# Patient Record
Sex: MALE | Race: WHITE | NOT HISPANIC OR LATINO | Employment: OTHER | URBAN - METROPOLITAN AREA
[De-identification: names, ages, dates, MRNs, and addresses within clinical notes are randomized per-mention and may not be internally consistent; named-entity substitution may affect disease eponyms.]

---

## 2018-04-25 ENCOUNTER — TRANSFERRED RECORDS (OUTPATIENT)
Dept: HEALTH INFORMATION MANAGEMENT | Facility: CLINIC | Age: 68
End: 2018-04-25

## 2018-04-25 LAB
ALT SERPL-CCNC: 23 IU/L (ref 6–40)
AST SERPL-CCNC: 16 IU/L (ref 10–40)
CREAT SERPL-MCNC: 1.05 MG/DL (ref 0.7–1.2)
GLUCOSE SERPL-MCNC: 261 MG/DL (ref 70–100)
HBA1C MFR BLD: 10.5 % (ref 4–6)
POTASSIUM SERPL-SCNC: 4 MEQ/L (ref 3.4–5.1)

## 2018-04-27 ENCOUNTER — OFFICE VISIT (OUTPATIENT)
Dept: FAMILY MEDICINE | Facility: OTHER | Age: 68
End: 2018-04-27
Attending: NURSE PRACTITIONER
Payer: MEDICAID

## 2018-04-27 VITALS
OXYGEN SATURATION: 97 % | TEMPERATURE: 98.3 F | RESPIRATION RATE: 18 BRPM | DIASTOLIC BLOOD PRESSURE: 118 MMHG | SYSTOLIC BLOOD PRESSURE: 182 MMHG | WEIGHT: 286.6 LBS | HEART RATE: 89 BPM | HEIGHT: 74 IN | BODY MASS INDEX: 36.78 KG/M2

## 2018-04-27 DIAGNOSIS — E11.9 TYPE 2 DIABETES MELLITUS WITHOUT COMPLICATION, WITHOUT LONG-TERM CURRENT USE OF INSULIN (H): ICD-10-CM

## 2018-04-27 DIAGNOSIS — I10 BENIGN ESSENTIAL HYPERTENSION: Primary | ICD-10-CM

## 2018-04-27 PROBLEM — R41.82 ALTERED MENTAL STATUS: Status: ACTIVE | Noted: 2018-04-27

## 2018-04-27 PROBLEM — M54.40 CHRONIC BILATERAL LOW BACK PAIN WITH SCIATICA: Status: ACTIVE | Noted: 2018-04-27

## 2018-04-27 PROBLEM — M54.42 CHRONIC BILATERAL LOW BACK PAIN WITH SCIATICA: Status: ACTIVE | Noted: 2018-04-27

## 2018-04-27 PROBLEM — M54.41 CHRONIC BILATERAL LOW BACK PAIN WITH SCIATICA: Status: ACTIVE | Noted: 2018-04-27

## 2018-04-27 PROBLEM — R53.82 CHRONIC FATIGUE: Status: ACTIVE | Noted: 2018-04-27

## 2018-04-27 PROBLEM — H53.9 VISION CHANGES: Status: ACTIVE | Noted: 2018-04-27

## 2018-04-27 PROBLEM — R47.81 SLURRED SPEECH: Status: ACTIVE | Noted: 2018-04-27

## 2018-04-27 PROBLEM — E66.9 OBESITY: Status: ACTIVE | Noted: 2018-04-27

## 2018-04-27 PROBLEM — R26.9 ALTERED GAIT: Status: ACTIVE | Noted: 2018-04-27

## 2018-04-27 PROBLEM — G89.29 CHRONIC BILATERAL LOW BACK PAIN WITH SCIATICA: Status: ACTIVE | Noted: 2018-04-27

## 2018-04-27 PROCEDURE — 99214 OFFICE O/P EST MOD 30 MIN: CPT | Performed by: NURSE PRACTITIONER

## 2018-04-27 PROCEDURE — G0463 HOSPITAL OUTPT CLINIC VISIT: HCPCS | Performed by: NURSE PRACTITIONER

## 2018-04-27 RX ORDER — CHOLECALCIFEROL (VITAMIN D3) 25 MCG
1000 CAPSULE ORAL 2 TIMES DAILY
COMMUNITY

## 2018-04-27 RX ORDER — LANOLIN ALCOHOL/MO/W.PET/CERES
1000 CREAM (GRAM) TOPICAL DAILY
COMMUNITY
End: 2018-06-14

## 2018-04-27 RX ORDER — ATENOLOL 50 MG/1
50 TABLET ORAL DAILY
Qty: 90 TABLET | Refills: 1 | Status: SHIPPED | OUTPATIENT
Start: 2018-04-27 | End: 2018-04-30

## 2018-04-27 RX ORDER — LISINOPRIL AND HYDROCHLOROTHIAZIDE 20; 25 MG/1; MG/1
1 TABLET ORAL DAILY
Qty: 30 TABLET | Refills: 0 | COMMUNITY
Start: 2018-04-27 | End: 2018-05-02

## 2018-04-27 ASSESSMENT — ANXIETY QUESTIONNAIRES
4. TROUBLE RELAXING: NEARLY EVERY DAY
3. WORRYING TOO MUCH ABOUT DIFFERENT THINGS: NOT AT ALL
6. BECOMING EASILY ANNOYED OR IRRITABLE: NOT AT ALL
5. BEING SO RESTLESS THAT IT IS HARD TO SIT STILL: NOT AT ALL
GAD7 TOTAL SCORE: 4
IF YOU CHECKED OFF ANY PROBLEMS ON THIS QUESTIONNAIRE, HOW DIFFICULT HAVE THESE PROBLEMS MADE IT FOR YOU TO DO YOUR WORK, TAKE CARE OF THINGS AT HOME, OR GET ALONG WITH OTHER PEOPLE: NOT DIFFICULT AT ALL
7. FEELING AFRAID AS IF SOMETHING AWFUL MIGHT HAPPEN: NOT AT ALL
2. NOT BEING ABLE TO STOP OR CONTROL WORRYING: NOT AT ALL
1. FEELING NERVOUS, ANXIOUS, OR ON EDGE: SEVERAL DAYS

## 2018-04-27 ASSESSMENT — PAIN SCALES - GENERAL: PAINLEVEL: NO PAIN (0)

## 2018-04-27 NOTE — PATIENT INSTRUCTIONS
ASSESSMENT/PLAN:       1. Benign essential hypertension  Continue lisinopril/HCTZ  - atenolol (TENORMIN) 50 MG tablet; Take 1 tablet (50 mg) by mouth daily  Dispense: 90 tablet; Refill: 1    2. Type 2 diabetes mellitus without complication, without long-term current use of insulin (H)  New onset  - metFORMIN (GLUCOPHAGE) 500 MG tablet; Take 2 tablets (1,000 mg) by mouth 2 times daily (with meals)  Dispense: 180 tablet; Refill: 3  Metformin start:   Week 1:  One tablet (500mg) with breakfast   Week 2:  One tablet (500mg) with breakfast and supper   Week 3:  Two tablets (1000mg) with breakfast and one tablet (500mg) with supper   Week 4:  Two tablets (1000mg) with breakfast and supper - please stay on this dose.      FUTURE APPOINTMENTS:       - Follow-up visit Monday with Dr Brayan Anand, NP  [facility]

## 2018-04-27 NOTE — NURSING NOTE
"Chief Complaint   Patient presents with     Hypertension       Initial BP (!) 182/118 (BP Location: Right arm, Patient Position: Chair, Cuff Size: Adult Large)  Pulse 89  Temp 98.3  F (36.8  C) (Tympanic)  Resp 18  Ht 6' 2\" (1.88 m)  Wt 286 lb 9.6 oz (130 kg)  SpO2 97%  BMI 36.8 kg/m2 Estimated body mass index is 36.8 kg/(m^2) as calculated from the following:    Height as of this encounter: 6' 2\" (1.88 m).    Weight as of this encounter: 286 lb 9.6 oz (130 kg).  Medication Reconciliation: complete   Pamela M Lechevalier LPN      "

## 2018-04-27 NOTE — MR AVS SNAPSHOT
After Visit Summary   4/27/2018    Deon Culver    MRN: [id number]           Patient Information     Date Of Birth          1950        Visit Information        Provider Department      4/27/2018 4:15 PM Yvette Anand NP [facility]        Today's Diagnoses     Benign essential hypertension    -  1    Type 2 diabetes mellitus without complication, without long-term current use of insulin (H)          Care Instructions      ASSESSMENT/PLAN:       1. Benign essential hypertension  Continue lisinopril/HCTZ  - atenolol (TENORMIN) 50 MG tablet; Take 1 tablet (50 mg) by mouth daily  Dispense: 90 tablet; Refill: 1    2. Type 2 diabetes mellitus without complication, without long-term current use of insulin (H)  New onset  - metFORMIN (GLUCOPHAGE) 500 MG tablet; Take 2 tablets (1,000 mg) by mouth 2 times daily (with meals)  Dispense: 180 tablet; Refill: 3  Metformin start:   Week 1:  One tablet (500mg) with breakfast   Week 2:  One tablet (500mg) with breakfast and supper   Week 3:  Two tablets (1000mg) with breakfast and one tablet (500mg) with supper   Week 4:  Two tablets (1000mg) with breakfast and supper - please stay on this dose.      FUTURE APPOINTMENTS:       - Follow-up visit Monday with Dr Brayan Anand NP  [facility]          Follow-ups after your visit        Your next 10 appointments already scheduled     Apr 30, 2018 10:00 AM CDT   (Arrive by 9:45 AM)   New Patient with Sreedhar Mendieta DO   [facility] ([facility] )    [address]  [place] [id number] [phone]           Instruct patient to bring all current medications for their initial appointment.              Who to contact     If you have questions or need follow up information about today's clinic visit or your schedule please contact [facility] directly at [phone].  Normal or  "non-critical lab and imaging results will be communicated to you by MyChart, letter or phone within 4 business days after the clinic has received the results. If you do not hear from us within 7 days, please contact the clinic through elmenust or phone. If you have a critical or abnormal lab result, we will notify you by phone as soon as possible.  Submit refill requests through Mobii or call your pharmacy and they will forward the refill request to us. Please allow 3 business days for your refill to be completed.          Additional Information About Your Visit        Mobii Information     Mobii lets you send messages to your doctor, view your test results, renew your prescriptions, schedule appointments and more. To sign up, go to [email or web address] . Click on \"Log in\" on the left side of the screen, which will take you to the Welcome page. Then click on \"Sign up Now\" on the right side of the page.     You will be asked to enter the access code listed below, as well as some personal information. Please follow the directions to create your username and password.     Your access code is: [email or web address]  Expires: 2018  4:43 PM     Your access code will  in 90 days. If you need help or a new code, please call your [place] clinic or [phone].        Care EveryWhere ID     This is your Care EveryWhere ID. This could be used by other organizations to access your [place] medical records  [id number]        Your Vitals Were     Pulse Temperature Respirations Height Pulse Oximetry BMI (Body Mass Index)    89 98.3  F (36.8  C) (Tympanic) 18 6' 2\" (1.88 m) 97% 36.8 kg/m2       Blood Pressure from Last 3 Encounters:   18 (!) 182/118    Weight from Last 3 Encounters:   18 286 lb 9.6 oz (130 kg)              Today, you had the following     No orders found for display         Today's Medication Changes          These changes are accurate as of 18  4:43 PM.  If you have any questions, " ask your nurse or doctor.               Start taking these medicines.        Dose/Directions    atenolol 50 MG tablet   Commonly known as:  TENORMIN   Used for:  Benign essential hypertension   Started by:  Yvette Anand NP        Dose:  50 mg   Take 1 tablet (50 mg) by mouth daily   Quantity:  90 tablet   Refills:  1       metFORMIN 500 MG tablet   Commonly known as:  GLUCOPHAGE   Used for:  Type 2 diabetes mellitus without complication, without long-term current use of insulin (H)   Started by:  Yvette Anand NP        Dose:  1000 mg   Take 2 tablets (1,000 mg) by mouth 2 times daily (with meals)   Quantity:  180 tablet   Refills:  3            Where to get your medicines      These medications were sent to [facility] [address] [place] [address] [address]  [address] [facility] [address]     Phone:  [phone]     atenolol 50 MG tablet    metFORMIN 500 MG tablet                Primary Care Provider Fax #    Physician No Ref-Primary [phone]       No address on file        Equal Access to Services     [facility] : Hadii bonnie ku hadasho Soomaali, waaxda luqadaha, qaybta kaalmada adeegyada, waxay claudia mckeon . So [facility] [place] [facility] [phone].    ATENCIÓN: Si habla español, tiene a rodriguez disposición servicios gratuitos de asistencia lingüística. Llame al [phone].    We comply with applicable federal civil rights laws and [place] laws. We do not discriminate on the basis of race, color, national origin, age, disability, sex, sexual orientation, or gender identity.            Thank you!     Thank you for choosing [facility]  for your care. Our goal is always to provide you with excellent care. Hearing back from our patients is one way we can continue to improve our services. Please take a few minutes to complete the written survey that you may receive in the mail after your visit with us. Thank you!             Your Updated Medication  List - Protect others around you: Learn how to safely use, store and throw away your medicines at www.disposemymeds.org.          This list is accurate as of 4/27/18  4:43 PM.  Always use your most recent med list.                   Brand Name Dispense Instructions for use Diagnosis    atenolol 50 MG tablet    TENORMIN    90 tablet    Take 1 tablet (50 mg) by mouth daily    Benign essential hypertension       cyanocobalamin 1000 MCG tablet    vitamin  B-12     Take 1,000 mcg by mouth daily        lisinopril-hydrochlorothiazide 20-25 MG per tablet    PRINZIDE/ZESTORETIC    30 tablet    Take 1 tablet by mouth daily    Benign essential hypertension       metFORMIN 500 MG tablet    GLUCOPHAGE    180 tablet    Take 2 tablets (1,000 mg) by mouth 2 times daily (with meals)    Type 2 diabetes mellitus without complication, without long-term current use of insulin (H)       Vitamin D-3 1000 units Caps      Take 1,000 Units by mouth

## 2018-04-27 NOTE — PROGRESS NOTES
SUBJECTIVE:   Deon Culver is a 68 year old male who presents to clinic today for the following health issues:  Hypertension     Mendoza was in the ED on April 25, 2018.  He actually presented to [facility], blood pressure was elevated and they sent him to the ED. Consent obtained, [facility] medical record reviewed.      Glucose was 261mg/dl A1c is 10.5% - now onset diabetes.  Urine had 500 glucose and 30 protein.  HGB was 17.6.      He was started on lisinopril/HCTZ; home BP readings continue to be elevated.  CT, EKG were completed.      Hypertension       Outpatient blood pressures are being checked at home.  Results are 170'-105.    Low Salt Diet: no added salt      Amount of exercise or physical activity: 6-7 days/week for an average of less than 15 minutes    Problems taking medications regularly: No    Medication side effects: none    Diet: regular (no restrictions)      Problem list and histories reviewed & adjusted, as indicated.  Additional history: as documented    Patient Active Problem List   Diagnosis     Benign essential hypertension     Obesity     Type 2 diabetes mellitus with complication, without long-term current use of insulin (H)     Chronic fatigue     Slurred speech x 3 months, CT neg ED [facility] 4/25/18     Vision changes x 3 months as of 4/18     Chronic bilateral low back pain with sciatica     Altered gait     Altered mental status - since approx 1/18 - delayed response, change in mentation     No past surgical history on file.    Social History   Substance Use Topics     Smoking status: Never Smoker     Smokeless tobacco: Never Used     Alcohol use No     History reviewed. No pertinent family history.      Current Outpatient Prescriptions   Medication Sig Dispense Refill     Cholecalciferol (VITAMIN D-3) 1000 units CAPS Take 1,000 Units by mouth       cyanocobalamin (VITAMIN  B-12) 1000 MCG tablet Take 1,000 mcg by mouth daily       lisinopril-hydrochlorothiazide (PRINZIDE/ZESTORETIC)  "20-25 MG per tablet Take 1 tablet by mouth daily 30 tablet 0     No Known Allergies  No lab results found.   BP Readings from Last 3 Encounters:   04/27/18 (!) 182/118    Wt Readings from Last 3 Encounters:   04/27/18 286 lb 9.6 oz (130 kg)                    Reviewed and updated as needed this visit by clinical staff  Tobacco  Allergies  Fam Hx  Soc Hx      Reviewed and updated as needed this visit by Provider  Tobacco  Fam Hx  Soc Hx        ROS:  Constitutional, HEENT, cardiovascular, pulmonary, gi and gu systems are negative, except as otherwise noted.    OBJECTIVE:     BP (!) 182/118 (BP Location: Right arm, Patient Position: Chair, Cuff Size: Adult Large)  Pulse 89  Temp 98.3  F (36.8  C) (Tympanic)  Resp 18  Ht 6' 2\" (1.88 m)  Wt 286 lb 9.6 oz (130 kg)  SpO2 97%  BMI 36.8 kg/m2  Body mass index is 36.8 kg/(m^2).  GENERAL: healthy, alert and no distress  RESP: lungs clear to auscultation - no rales, rhonchi or wheezes  CV: regular rate and rhythm, normal S1 S2, no S3 or S4, no murmur, click or rub, no peripheral edema and peripheral pulses strong  MS: generalized weakness  PSYCH: mentation appears normal, affect normal/bright    This document is currently in Final Status    Exam Accession# [id number]    CT HEAD WO CONTRAST    HISTORY: 2-3 days of slurred speech 2 weeks ago and since resolved;     COMPARISON: None    FINDINGS: There is mild generalized atrophy with enlargement of the ventricles and subarachnoid spaces. No acute intra or extraaxial hemorrhage is seen. The gray-white matter differentiation is maintained. No mass effect or midline shift. The cervicomedullary junction is within normal limits. The visualized portions of the paranasal sinuses and mastoid air cells are clear.     IMPRESSION: No acute intracranial process identified.            Dictated By: Katharine Nieves MD 4/25/2018 6:25 PM  Edited By: KIRSTEN 4/25/2018 6:31 PM    Electronically Signed: Katharine Nieves MD 4/26/2018 9:11 AM    Component " Name Value Ref Range   Ventricular Rate 90 BPM    Atrial Rate 90 BPM    P-R Interval 236 ms   QRS Duration 118 ms    ms   QTc 486 ms   P Axis 62 degrees    R Axis -71 degrees    T Axis 82 degrees    Result Narrative   Confirming Doc MARCELLUS XIE MD  Sinus rhythm with 1st degree A-V block  Left anterior fascicular block  Left ventricular hypertrophy with QRS widening  Prolonged QT  Abnormal ECG  No previous ECGs available       ASSESSMENT/PLAN:       1. Benign essential hypertension  Continue lisinopril/HCTZ  - atenolol (TENORMIN) 50 MG tablet; Take 1 tablet (50 mg) by mouth daily  Dispense: 90 tablet; Refill: 1    2. Type 2 diabetes mellitus without complication, without long-term current use of insulin (H)  New onset  - metFORMIN (GLUCOPHAGE) 500 MG tablet; Take 2 tablets (1,000 mg) by mouth 2 times daily (with meals)  Dispense: 180 tablet; Refill: 3  Metformin start:   Week 1:  One tablet (500mg) with breakfast   Week 2:  One tablet (500mg) with breakfast and supper   Week 3:  Two tablets (1000mg) with breakfast and one tablet (500mg) with supper   Week 4:  Two tablets (1000mg) with breakfast and supper - please stay on this dose.      Reviewed carbohydrate counting, metformin onset, action, side effects and dosing.      30 minute visit today with greater than 50% spent in counseling on the above topics.  Answered all questions to the best of my ability.      FUTURE APPOINTMENTS:       - Follow-up visit Monday with Dr Brayan Anand, NP  [facility]

## 2018-04-28 ASSESSMENT — PATIENT HEALTH QUESTIONNAIRE - PHQ9: SUM OF ALL RESPONSES TO PHQ QUESTIONS 1-9: 9

## 2018-04-28 ASSESSMENT — ANXIETY QUESTIONNAIRES: GAD7 TOTAL SCORE: 4

## 2018-04-30 ENCOUNTER — OFFICE VISIT (OUTPATIENT)
Dept: INTERNAL MEDICINE | Facility: OTHER | Age: 68
End: 2018-04-30
Attending: INTERNAL MEDICINE
Payer: MEDICAID

## 2018-04-30 VITALS
OXYGEN SATURATION: 99 % | SYSTOLIC BLOOD PRESSURE: 160 MMHG | WEIGHT: 280 LBS | HEIGHT: 74 IN | DIASTOLIC BLOOD PRESSURE: 110 MMHG | TEMPERATURE: 95.9 F | HEART RATE: 70 BPM | BODY MASS INDEX: 35.94 KG/M2

## 2018-04-30 DIAGNOSIS — E11.8 TYPE 2 DIABETES MELLITUS WITH COMPLICATION, WITHOUT LONG-TERM CURRENT USE OF INSULIN (H): ICD-10-CM

## 2018-04-30 DIAGNOSIS — Z13.220 SCREENING FOR HYPERLIPIDEMIA: ICD-10-CM

## 2018-04-30 DIAGNOSIS — Z13.29 SCREENING FOR HYPOTHYROIDISM: ICD-10-CM

## 2018-04-30 DIAGNOSIS — Z12.5 SCREENING FOR PROSTATE CANCER: Primary | ICD-10-CM

## 2018-04-30 DIAGNOSIS — R53.83 OTHER FATIGUE: ICD-10-CM

## 2018-04-30 DIAGNOSIS — I10 BENIGN ESSENTIAL HYPERTENSION: ICD-10-CM

## 2018-04-30 DIAGNOSIS — R47.81 SLURRED SPEECH: ICD-10-CM

## 2018-04-30 LAB
CHOLEST SERPL-MCNC: 175 MG/DL
HDLC SERPL-MCNC: 50 MG/DL
LDLC SERPL CALC-MCNC: 86 MG/DL
NONHDLC SERPL-MCNC: 125 MG/DL
PSA SERPL-ACNC: 0.74 UG/L (ref 0–4)
TRIGL SERPL-MCNC: 196 MG/DL
TSH SERPL DL<=0.005 MIU/L-ACNC: 1.43 MU/L (ref 0.4–4)

## 2018-04-30 PROCEDURE — G0103 PSA SCREENING: HCPCS | Mod: ZL | Performed by: INTERNAL MEDICINE

## 2018-04-30 PROCEDURE — 84443 ASSAY THYROID STIM HORMONE: CPT | Mod: ZL | Performed by: INTERNAL MEDICINE

## 2018-04-30 PROCEDURE — 99205 OFFICE O/P NEW HI 60 MIN: CPT | Performed by: INTERNAL MEDICINE

## 2018-04-30 PROCEDURE — 80061 LIPID PANEL: CPT | Mod: ZL | Performed by: INTERNAL MEDICINE

## 2018-04-30 PROCEDURE — G0463 HOSPITAL OUTPT CLINIC VISIT: HCPCS | Performed by: INTERNAL MEDICINE

## 2018-04-30 PROCEDURE — 36415 COLL VENOUS BLD VENIPUNCTURE: CPT | Mod: ZL | Performed by: INTERNAL MEDICINE

## 2018-04-30 RX ORDER — METOPROLOL TARTRATE 50 MG
50 TABLET ORAL 2 TIMES DAILY
Qty: 60 TABLET | Refills: 1 | Status: SHIPPED | OUTPATIENT
Start: 2018-04-30 | End: 2018-06-24

## 2018-04-30 ASSESSMENT — PATIENT HEALTH QUESTIONNAIRE - PHQ9: 5. POOR APPETITE OR OVEREATING: NOT AT ALL

## 2018-04-30 ASSESSMENT — ANXIETY QUESTIONNAIRES
5. BEING SO RESTLESS THAT IT IS HARD TO SIT STILL: NOT AT ALL
7. FEELING AFRAID AS IF SOMETHING AWFUL MIGHT HAPPEN: NOT AT ALL
3. WORRYING TOO MUCH ABOUT DIFFERENT THINGS: NOT AT ALL
GAD7 TOTAL SCORE: 0
2. NOT BEING ABLE TO STOP OR CONTROL WORRYING: NOT AT ALL
1. FEELING NERVOUS, ANXIOUS, OR ON EDGE: NOT AT ALL
6. BECOMING EASILY ANNOYED OR IRRITABLE: NOT AT ALL

## 2018-04-30 ASSESSMENT — PAIN SCALES - GENERAL: PAINLEVEL: NO PAIN (0)

## 2018-04-30 NOTE — PROGRESS NOTES
SUBJECTIVE:    Chief Complaint   Patient presents with     Establish Care     referred by Temi Millan - pt is fasting today - no questions or concerns - has not doctored in 4 decades- [facility] is a 68 year old male with no PMH due to the fact he has not sought healthcare in 40+ years.  However recently he was seen at [facility] and found to be hypertensive and was sent ot he ED in [place].  In addition he was found to be diabetic with A1C of 10.5.  He complains of vision changes which is likely related to his Diabetes.  He is accompanied by his wife and daughter who also reports some change in sleeping habits, lethargy, weakness and even some slurred speech at times.  He also endorses chest pain at times.  No fevers or chills.  Some intentional weight loss over the past year.  He does smoke cigars routinely.  No falls are reported.    Records from [facility] ED visit were reviewed today along with his CT scan of the head from [facility].         Past Medical History:   Diagnosis Date     Altered gait 4/27/2018     Altered mental status - since approx 1/18 - delayed response, change in mentation 4/27/2018     Benign essential hypertension 4/27/2018     Chronic bilateral low back pain with sciatica 4/27/2018     Chronic fatigue 4/27/2018     Obesity 4/27/2018     Slurred speech x 3 months, CT neg ED [facility] 4/25/18 4/27/2018     Type 2 diabetes mellitus with complication, without long-term current use of insulin (H) 4/27/2018     Vision changes x 3 months as of 4/18 4/27/2018       History reviewed. No pertinent surgical history.     No Known Allergies    Medications:  Current Outpatient Prescriptions   Medication Sig Dispense Refill     Cholecalciferol (VITAMIN D-3) 1000 units CAPS Take 1,000 Units by mouth       cyanocobalamin (VITAMIN  B-12) 1000 MCG tablet Take 1,000 mcg by mouth daily       lisinopril-hydrochlorothiazide (PRINZIDE/ZESTORETIC) 20-25 MG per tablet Take 1 tablet by mouth daily  "30 tablet 0     metFORMIN (GLUCOPHAGE) 500 MG tablet Take 2 tablets (1,000 mg) by mouth 2 times daily (with meals) 180 tablet 3     metoprolol tartrate (LOPRESSOR) 50 MG tablet Take 1 tablet (50 mg) by mouth 2 times daily 60 tablet 1       Family History   Problem Relation Age of Onset     Colon Cancer Mother      Kidney Cancer Father      KIDNEY DISEASE Father      Coronary Artery Disease Brother        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      Social History     Social History Narrative        Review Of Systems  Constitutional: Fatigue and Weight Loss  Eyes: visual blurring  Ears/Nose/Throat: negative  Cardiovascular: chest pain  Respiratory: Shortness of breath  Gastrointestinal: negative  Genitourinary: negative  Musculoskeletal: arthritis, joint pain and muscular weakness  Skin: negative  Neurologic: as above  Endocrine: diabetes  Hematologic/Lymphatic/Immunologic: negative  Psychiatric: as above    OBJECTIVE:  BP (!) 160/110 (BP Location: Right arm, Patient Position: Supine, Cuff Size: Adult Large)  Pulse 70  Temp 95.9  F (35.5  C) (Tympanic)  Ht 6' 2\" (1.88 m)  Wt 280 lb (127 kg)  SpO2 99%  BMI 35.95 kg/m2  Body mass index is 35.95 kg/(m^2).   Gen: Well-developed, well-nourished and in no apparent distress  HEENT:  Normocephalic, atraumatic, PERRL, no scleral icterus, TMs visualized bilaterally without effusion/erythema, external auditory canals clear.  Cranial nerves grossly intact. Poor dentition and swollen uvula.    Neck: supple, no LAD, no thyromegaly. No bruits.    CV: regular rate and rhythm, normal S1 S2, no S3 or S4 and no murmur, click, or rub  Resp: Clear to ausculation bilaterally, normal respiratory effort  Abd: Obese-Bowel sounds present, soft NT/ND,  no masses or hepatosplenomegaly  Ext: +1 LE edema.   Neuro:  No focal deficits noted, however " cognition seems slighy slowed along with his speech and subtle slurring of words.    Skin: warm and dry  Psych: normal mood/affect, appropriately oriented.      ASSESSMENT/PLAN:  Pt is a 68 year old male here to establish care    Deon was seen today for establish care.    Diagnoses and all orders for this visit:    Screening for prostate cancer  -     PSA, screen    Screening for hypothyroidism  -     TSH with free T4 reflex    Screening for hyperlipidemia  -     Lipid Profile (Chol, Trig, HDL, LDL calc)    Other fatigue  -     NM Lexiscan stress test; Future    Slurred speech  -     MRA Angiogram Brain & MRI Brain w/o Cont; Future    Benign essential hypertension  -     metoprolol tartrate (LOPRESSOR) 50 MG tablet; Take 1 tablet (50 mg) by mouth 2 times daily    Type 2 diabetes mellitus with complication, without long-term current use of insulin (H)           Return to clinic in  2 weeks.    Labs today  MRI/MRA Brain  Stress test  Stop Atenolol and start Lopressor twice daily      Sreedhar Mendieta D.O.

## 2018-04-30 NOTE — MR AVS SNAPSHOT
"              After Visit Summary   4/30/2018    Deon Culver    MRN: [id number]           Patient Information     Date Of Birth          1950        Visit Information        Provider Department      4/30/2018 10:00 AM Sreedhar Mendieta DO [facility]        Today's Diagnoses     Screening for prostate cancer    -  1    Screening for hypothyroidism        Screening for hyperlipidemia        Other fatigue        Slurred speech        Benign essential hypertension          Care Instructions    Labs today  MRI/MRA Brain  Stress test  Stop Atenolol and start Lopressor twice daily          Follow-ups after your visit        Future tests that were ordered for you today     Open Future Orders        Priority Expected Expires Ordered    MRA Angiogram Brain & MRI Brain w/o Cont Routine  4/30/2019 4/30/2018    NM Lexiscan stress test Routine  4/30/2019 4/30/2018            Who to contact     If you have questions or need follow up information about today's clinic visit or your schedule please contact [facility] directly at [phone].  Normal or non-critical lab and imaging results will be communicated to you by MyChart, letter or phone within 4 business days after the clinic has received the results. If you do not hear from us within 7 days, please contact the clinic through Azoit or phone. If you have a critical or abnormal lab result, we will notify you by phone as soon as possible.  Submit refill requests through Farfetch or call your pharmacy and they will forward the refill request to us. Please allow 3 business days for your refill to be completed.          Additional Information About Your Visit        Biofuelboxhart Information     Farfetch lets you send messages to your doctor, view your test results, renew your prescriptions, schedule appointments and more. To sign up, go to [email or web address] . Click on \"Log in\" on the left side of the screen, which will take you to the " "Welcome page. Then click on \"Sign up Now\" on the right side of the page.     You will be asked to enter the access code listed below, as well as some personal information. Please follow the directions to create your username and password.     Your access code is: [email or web address]  Expires: 2018  4:43 PM     Your access code will  in 90 days. If you need help or a new code, please call your [place] clinic or [phone].        Care EveryWhere ID     This is your Care EveryWhere ID. This could be used by other organizations to access your [place] medical records  [id number]        Your Vitals Were     Pulse Temperature Height Pulse Oximetry BMI (Body Mass Index)       70 95.9  F (35.5  C) (Tympanic) 6' 2\" (1.88 m) 99% 35.95 kg/m2        Blood Pressure from Last 3 Encounters:   18 (!) 160/110   18 (!) 182/118    Weight from Last 3 Encounters:   18 280 lb (127 kg)   18 286 lb 9.6 oz (130 kg)              We Performed the Following     Lipid Profile (Chol, Trig, HDL, LDL calc)     PSA, screen     TSH with free T4 reflex          Today's Medication Changes          These changes are accurate as of 18 10:40 AM.  If you have any questions, ask your nurse or doctor.               Start taking these medicines.        Dose/Directions    metoprolol tartrate 50 MG tablet   Commonly known as:  LOPRESSOR   Used for:  Benign essential hypertension   Started by:  Sreedhar Mendieta DO        Dose:  50 mg   Take 1 tablet (50 mg) by mouth 2 times daily   Quantity:  60 tablet   Refills:  1         Stop taking these medicines if you haven't already. Please contact your care team if you have questions.     atenolol 50 MG tablet   Commonly known as:  TENORMIN   Stopped by:  Sreedhar Mendieta DO                Where to get your medicines      These medications were sent to [id number] - [place], [place] - [address]   [address] , [facility] [address]     Phone: "  [phone]     metoprolol tartrate 50 MG tablet                Primary Care Provider Fax #    Physician No Ref-Primary [phone]       No address on file        Equal Access to Services     ELVIA NAVA : Dejan aad ku hadblanca Lacey, cherelle bates, rowdy kaericka birch, fidel haindia rolando. So [facility] [phone].    ATENCIÓN: Si habla español, tiene a rodriguez disposición servicios gratuitos de asistencia lingüística. Llame al [phone].    We comply with applicable federal civil rights laws and [place] laws. We do not discriminate on the basis of race, color, national origin, age, disability, sex, sexual orientation, or gender identity.            Thank you!     Thank you for choosing [facility]  for your care. Our goal is always to provide you with excellent care. Hearing back from our patients is one way we can continue to improve our services. Please take a few minutes to complete the written survey that you may receive in the mail after your visit with us. Thank you!             Your Updated Medication List - Protect others around you: Learn how to safely use, store and throw away your medicines at www.disposemymeds.org.          This list is accurate as of 4/30/18 10:40 AM.  Always use your most recent med list.                   Brand Name Dispense Instructions for use Diagnosis    cyanocobalamin 1000 MCG tablet    vitamin  B-12     Take 1,000 mcg by mouth daily        lisinopril-hydrochlorothiazide 20-25 MG per tablet    PRINZIDE/ZESTORETIC    30 tablet    Take 1 tablet by mouth daily    Benign essential hypertension       metFORMIN 500 MG tablet    GLUCOPHAGE    180 tablet    Take 2 tablets (1,000 mg) by mouth 2 times daily (with meals)    Type 2 diabetes mellitus without complication, without long-term current use of insulin (H)       metoprolol tartrate 50 MG tablet    LOPRESSOR    60 tablet    Take 1 tablet (50 mg) by mouth 2 times daily    Benign  essential hypertension       Vitamin D-3 1000 units Caps      Take 1,000 Units by mouth

## 2018-05-01 ASSESSMENT — PATIENT HEALTH QUESTIONNAIRE - PHQ9: SUM OF ALL RESPONSES TO PHQ QUESTIONS 1-9: 9

## 2018-05-01 ASSESSMENT — ANXIETY QUESTIONNAIRES: GAD7 TOTAL SCORE: 0

## 2018-05-02 ENCOUNTER — TELEPHONE (OUTPATIENT)
Dept: INTERNAL MEDICINE | Facility: OTHER | Age: 68
End: 2018-05-02

## 2018-05-02 DIAGNOSIS — I10 BENIGN ESSENTIAL HYPERTENSION: Primary | ICD-10-CM

## 2018-05-02 RX ORDER — LISINOPRIL 40 MG/1
40 TABLET ORAL DAILY
Qty: 30 TABLET | Refills: 3 | Status: SHIPPED | OUTPATIENT
Start: 2018-05-02 | End: 2018-08-23

## 2018-05-02 RX ORDER — HYDROCHLOROTHIAZIDE 25 MG/1
25 TABLET ORAL DAILY
Qty: 30 TABLET | Refills: 1 | Status: SHIPPED | OUTPATIENT
Start: 2018-05-02 | End: 2018-06-24

## 2018-05-02 NOTE — TELEPHONE ENCOUNTER
Received a phone call from Samantha- patient financial supervision of [employer]- she states pt does not have insurance at this time- estimates coming back at $8,000 for MRI,MRA and stress test- would like to know if these are emergent or can wait until he gets insurance. Ninoska Red LPN

## 2018-05-04 ENCOUNTER — TRANSFERRED RECORDS (OUTPATIENT)
Dept: HEALTH INFORMATION MANAGEMENT | Facility: CLINIC | Age: 68
End: 2018-05-04

## 2018-05-04 ENCOUNTER — TELEPHONE (OUTPATIENT)
Dept: INTERNAL MEDICINE | Facility: OTHER | Age: 68
End: 2018-05-04

## 2018-05-04 NOTE — TELEPHONE ENCOUNTER
Mendoza would like to give Dr Mendieta an update on his blood pressure for today:    134/94 7:00am 5/4/2018  158/98 1:00pm 5/4/2018    Thank you

## 2018-05-04 NOTE — TELEPHONE ENCOUNTER
Dr. Mendieta notified of blood pressure readings. No further action at this time. Ninoska Red LPN

## 2018-05-14 ENCOUNTER — OFFICE VISIT (OUTPATIENT)
Dept: INTERNAL MEDICINE | Facility: OTHER | Age: 68
End: 2018-05-14
Attending: INTERNAL MEDICINE
Payer: MEDICAID

## 2018-05-14 VITALS
SYSTOLIC BLOOD PRESSURE: 138 MMHG | HEART RATE: 81 BPM | TEMPERATURE: 96.9 F | WEIGHT: 276 LBS | DIASTOLIC BLOOD PRESSURE: 88 MMHG | BODY MASS INDEX: 35.42 KG/M2 | OXYGEN SATURATION: 97 % | HEIGHT: 74 IN

## 2018-05-14 DIAGNOSIS — L24.81 IRRITANT CONTACT DERMATITIS DUE TO METALS: ICD-10-CM

## 2018-05-14 DIAGNOSIS — R21 RASH: Primary | ICD-10-CM

## 2018-05-14 DIAGNOSIS — I10 BENIGN ESSENTIAL HYPERTENSION: ICD-10-CM

## 2018-05-14 DIAGNOSIS — N40.0 BENIGN PROSTATIC HYPERPLASIA, UNSPECIFIED WHETHER LOWER URINARY TRACT SYMPTOMS PRESENT: ICD-10-CM

## 2018-05-14 PROCEDURE — G0463 HOSPITAL OUTPT CLINIC VISIT: HCPCS | Performed by: INTERNAL MEDICINE

## 2018-05-14 PROCEDURE — 36415 COLL VENOUS BLD VENIPUNCTURE: CPT | Mod: ZL | Performed by: INTERNAL MEDICINE

## 2018-05-14 PROCEDURE — 80048 BASIC METABOLIC PNL TOTAL CA: CPT | Mod: ZL | Performed by: INTERNAL MEDICINE

## 2018-05-14 PROCEDURE — 99214 OFFICE O/P EST MOD 30 MIN: CPT | Performed by: INTERNAL MEDICINE

## 2018-05-14 RX ORDER — HYDROCORTISONE VALERATE CREAM 2 MG/G
CREAM TOPICAL
Qty: 45 G | Refills: 0 | Status: SHIPPED | OUTPATIENT
Start: 2018-05-14 | End: 2018-06-14

## 2018-05-14 RX ORDER — TAMSULOSIN HYDROCHLORIDE 0.4 MG/1
0.4 CAPSULE ORAL DAILY
Qty: 90 CAPSULE | Refills: 3 | Status: SHIPPED | OUTPATIENT
Start: 2018-05-14 | End: 2019-03-27

## 2018-05-14 ASSESSMENT — ANXIETY QUESTIONNAIRES
5. BEING SO RESTLESS THAT IT IS HARD TO SIT STILL: NOT AT ALL
3. WORRYING TOO MUCH ABOUT DIFFERENT THINGS: NOT AT ALL
GAD7 TOTAL SCORE: 0
1. FEELING NERVOUS, ANXIOUS, OR ON EDGE: NOT AT ALL
7. FEELING AFRAID AS IF SOMETHING AWFUL MIGHT HAPPEN: NOT AT ALL
6. BECOMING EASILY ANNOYED OR IRRITABLE: NOT AT ALL
2. NOT BEING ABLE TO STOP OR CONTROL WORRYING: NOT AT ALL

## 2018-05-14 ASSESSMENT — PATIENT HEALTH QUESTIONNAIRE - PHQ9: 5. POOR APPETITE OR OVEREATING: NOT AT ALL

## 2018-05-14 ASSESSMENT — PAIN SCALES - GENERAL: PAINLEVEL: NO PAIN (0)

## 2018-05-14 NOTE — MR AVS SNAPSHOT
After Visit Summary   5/14/2018    Deon Culver    MRN: [id number]           Patient Information     Date Of Birth          1950        Visit Information        Provider Department      5/14/2018 3:30 PM Sreedhar Mendieta,  [facility]        Today's Diagnoses     Rash    -  1    Benign essential hypertension        Benign prostatic hyperplasia, unspecified whether lower urinary tract symptoms present          Care Instructions    Follow after testing complete  BMP lab today  Hydrocortisone cream prn rash  Flomax daily    Start Baby ASA daily 81 mg PO daily          Follow-ups after your visit        Who to contact     If you have questions or need follow up information about today's clinic visit or your schedule please contact [facility] directly at [phone].  Normal or non-critical lab and imaging results will be communicated to you by EcoScrapshart, letter or phone within 4 business days after the clinic has received the results. If you do not hear from us within 7 days, please contact the clinic through Infectioust or phone. If you have a critical or abnormal lab result, we will notify you by phone as soon as possible.  Submit refill requests through SignaCert or call your pharmacy and they will forward the refill request to us. Please allow 3 business days for your refill to be completed.          Additional Information About Your Visit        MyChart Information     SignaCert gives you secure access to your electronic health record. If you see a primary care provider, you can also send messages to your care team and make appointments. If you have questions, please call your primary care clinic.  If you do not have a primary care provider, please call [phone] and they will assist you.        Care EveryWhere ID     This is your Care EveryWhere ID. This could be used by other organizations to access your [place] medical records  [id number]        Your Vitals  "Were     Pulse Temperature Height Pulse Oximetry BMI (Body Mass Index)       81 96.9  F (36.1  C) (Tympanic) 6' 2\" (1.88 m) 97% 35.44 kg/m2        Blood Pressure from Last 3 Encounters:   05/14/18 138/88   04/30/18 (!) 160/110   04/27/18 (!) 182/118    Weight from Last 3 Encounters:   05/14/18 276 lb (125.2 kg)   04/30/18 280 lb (127 kg)   04/27/18 286 lb 9.6 oz (130 kg)              We Performed the Following     Basic metabolic panel          Today's Medication Changes          These changes are accurate as of 5/14/18  3:53 PM.  If you have any questions, ask your nurse or doctor.               Start taking these medicines.        Dose/Directions    hydrocortisone 0.2 % cream   Commonly known as:  WESTCORT   Used for:  Rash   Started by:  Sreedhar Mendieta DO        Apply sparingly to affected area three times daily as needed.   Quantity:  45 g   Refills:  0       tamsulosin 0.4 MG capsule   Commonly known as:  FLOMAX   Used for:  Benign prostatic hyperplasia, unspecified whether lower urinary tract symptoms present   Started by:  Sreedhar Mendieta DO        Dose:  0.4 mg   Take 1 capsule (0.4 mg) by mouth daily   Quantity:  90 capsule   Refills:  3            Where to get your medicines      [facility] [address] [address]  AT [facility] [address]  [address] [facility] [address]     Phone:  [phone]     tamsulosin 0.4 MG capsule         These medications were sent to [id number] - [facility] [address]   [address] [facility] [address]     Phone:  [phone]     hydrocortisone 0.2 % cream                Primary Care Provider Office Phone # Fax #    Sreedhar Mendieta [phone]       [facility] [address]        Equal Access to Services     ELVIA NAVA AH: Dejan ralph hadasho Soomaali, waaxda luqadaha, qaybta kaalmada adeegyada, fidel oconnellin " carlo marshalltrinityдмитрий zuletaNikoleyessica ah. So [facility] [phone].    ATENCIÓN: Si freddyla gaurang, tiene a rodriguez disposición servicios gratuitos de asistencia lingüística. Theodora farmer [phone].    We comply with applicable federal civil rights laws and [place] laws. We do not discriminate on the basis of race, color, national origin, age, disability, sex, sexual orientation, or gender identity.            Thank you!     Thank you for choosing [facility]  for your care. Our goal is always to provide you with excellent care. Hearing back from our patients is one way we can continue to improve our services. Please take a few minutes to complete the written survey that you may receive in the mail after your visit with us. Thank you!             Your Updated Medication List - Protect others around you: Learn how to safely use, store and throw away your medicines at www.disposemymeds.org.          This list is accurate as of 5/14/18  3:53 PM.  Always use your most recent med list.                   Brand Name Dispense Instructions for use Diagnosis    cyanocobalamin 1000 MCG tablet    vitamin  B-12     Take 1,000 mcg by mouth daily        hydrochlorothiazide 25 MG tablet    HYDRODIURIL    30 tablet    Take 1 tablet (25 mg) by mouth daily    Benign essential hypertension       hydrocortisone 0.2 % cream    WESTCORT    45 g    Apply sparingly to affected area three times daily as needed.    Rash       lisinopril 40 MG tablet    PRINIVIL/ZESTRIL    30 tablet    Take 1 tablet (40 mg) by mouth daily    Benign essential hypertension       metFORMIN 500 MG tablet    GLUCOPHAGE    180 tablet    Take 2 tablets (1,000 mg) by mouth 2 times daily (with meals)    Type 2 diabetes mellitus without complication, without long-term current use of insulin (H)       metoprolol tartrate 50 MG tablet    LOPRESSOR    60 tablet    Take 1 tablet (50 mg) by mouth 2 times daily    Benign essential hypertension       tamsulosin 0.4 MG capsule     FLOMAX    90 capsule    Take 1 capsule (0.4 mg) by mouth daily    Benign prostatic hyperplasia, unspecified whether lower urinary tract symptoms present       Vitamin D-3 1000 units Caps      Take 1,000 Units by mouth

## 2018-05-14 NOTE — PATIENT INSTRUCTIONS
Follow after testing complete  BMP lab today  Hydrocortisone cream prn rash  Flomax daily    Start Baby ASA daily 81 mg PO daily

## 2018-05-14 NOTE — PROGRESS NOTES
SUBJECTIVE:   Deon Culver is a 68 year old male who presents to clinic today for the following health issues:      Hypertension Follow-up      Outpatient blood pressures are being checked at home.  Results are 130's-80's .    Low Salt Diet: no added salt      Amount of exercise or physical activity: None    Problems taking medications regularly: No    Medication side effects: none    Diet: carbohydrate counting      weakness      Duration: about a year     Description (location/character/radiation): weakness and numbness in legs- hard time walking and stability not there      Intensity:  mild, moderate    Accompanying signs and symptoms: unstable     History (similar episodes/previous evaluation): None    Precipitating or alleviating factors: None    Therapies tried and outcome: None       Rash      Duration: 6 months    Description  Location: middle front abdomen  Itching: moderate    Intensity:  moderate    Accompanying signs and symptoms: None    History (similar episodes/previous evaluation): None    Precipitating or alleviating factors:  New exposures:  None  Recent travel: no      Therapies tried and outcome: neosporin- minor relief     Mendoza is returning today for a follow up.  He has not yet completed his stress test or MRI as he does not have insurance.  He reports feeling better and his wife and daughter seem to think he is doing better recently.  He has been watching his diet and has also lost a few pounds.  He slurred speech seems to have resolved.  He denies any chest pain currently. BP at goal.        Problem list and histories reviewed & adjusted, as indicated.  Additional history: as documented    Patient Active Problem List   Diagnosis     Benign essential hypertension     Obesity     Type 2 diabetes mellitus with complication, without long-term current use of insulin (H)     Chronic fatigue     Slurred speech x 3 months, CT neg ED [facility] 4/25/18     Vision changes x 3 months as of 4/18      Chronic bilateral low back pain with sciatica     Altered gait     Altered mental status - since approx 1/18 - delayed response, change in mentation     History reviewed. No pertinent surgical history.    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      Family History   Problem Relation Age of Onset     Colon Cancer Mother      Kidney Cancer Father      KIDNEY DISEASE Father      Coronary Artery Disease Brother          Current Outpatient Prescriptions   Medication Sig Dispense Refill     Cholecalciferol (VITAMIN D-3) 1000 units CAPS Take 1,000 Units by mouth       cyanocobalamin (VITAMIN  B-12) 1000 MCG tablet Take 1,000 mcg by mouth daily       hydrochlorothiazide (HYDRODIURIL) 25 MG tablet Take 1 tablet (25 mg) by mouth daily 30 tablet 1     hydrocortisone (WESTCORT) 0.2 % cream Apply sparingly to affected area three times daily as needed. 45 g 0     lisinopril (PRINIVIL/ZESTRIL) 40 MG tablet Take 1 tablet (40 mg) by mouth daily 30 tablet 3     metFORMIN (GLUCOPHAGE) 500 MG tablet Take 2 tablets (1,000 mg) by mouth 2 times daily (with meals) 180 tablet 3     metoprolol tartrate (LOPRESSOR) 50 MG tablet Take 1 tablet (50 mg) by mouth 2 times daily 60 tablet 1     tamsulosin (FLOMAX) 0.4 MG capsule Take 1 capsule (0.4 mg) by mouth daily 90 capsule 3     No Known Allergies  Recent Labs   Lab Test  04/30/18   1048   LDL  86   HDL  50   TRIG  196*   TSH  1.43      BP Readings from Last 3 Encounters:   05/14/18 138/88   04/30/18 (!) 160/110   04/27/18 (!) 182/118    Wt Readings from Last 3 Encounters:   05/14/18 276 lb (125.2 kg)   04/30/18 280 lb (127 kg)   04/27/18 286 lb 9.6 oz (130 kg)                    Reviewed and updated as needed this visit by clinical staff       Reviewed and updated as needed this visit by Provider         ROS:  Constitutional, HEENT, cardiovascular,  "pulmonary, gi and gu systems are negative, except as otherwise noted.    OBJECTIVE:     /88 (BP Location: Right arm, Patient Position: Supine, Cuff Size: Adult Large)  Pulse 81  Temp 96.9  F (36.1  C) (Tympanic)  Ht 6' 2\" (1.88 m)  Wt 276 lb (125.2 kg)  SpO2 97%  BMI 35.44 kg/m2  Body mass index is 35.44 kg/(m^2).   GENERAL: healthy, alert and no distress  NECK: no adenopathy, no asymmetry, masses, or scars and thyroid normal to palpation  RESP: lungs clear to auscultation - no rales, rhonchi or wheezes  CV: regular rate and rhythm, normal S1 S2, no S3 or S4, no murmur, click or rub, no peripheral edema and peripheral pulses strong  ABDOMEN: soft, nontender, no hepatosplenomegaly, no masses and bowel sounds normal  MS: no gross musculoskeletal defects noted, no edema  SKIN: Contact dermatitis rash from metal belt buckle.    NEURO: No focal deficits.  Seems more alert today.    PSYCH: mentation appears normal, affect slightly flat but improved.      Diagnostic Test Results:  none     ASSESSMENT/PLAN:     Problem List Items Addressed This Visit     Benign essential hypertension    Relevant Orders    Basic metabolic panel (Completed)      Other Visit Diagnoses     Rash    -  Primary    Relevant Medications    hydrocortisone (WESTCORT) 0.2 % cream    Benign prostatic hyperplasia, unspecified whether lower urinary tract symptoms present        Relevant Medications    tamsulosin (FLOMAX) 0.4 MG capsule    Irritant contact dermatitis due to metals        Relevant Medications    hydrocortisone (WESTCORT) 0.2 % cream         Patient instructions:  Follow after testing complete  BMP lab today  Hydrocortisone cream prn rash  Flomax daily    Start Baby ASA daily 81 mg PO daily      Sreedhar Mendieta, DO  [facility]    "

## 2018-05-15 ENCOUNTER — TELEPHONE (OUTPATIENT)
Dept: INTERNAL MEDICINE | Facility: OTHER | Age: 68
End: 2018-05-15

## 2018-05-15 DIAGNOSIS — I10 BENIGN ESSENTIAL HYPERTENSION: Primary | ICD-10-CM

## 2018-05-15 LAB
ANION GAP SERPL CALCULATED.3IONS-SCNC: 10 MMOL/L (ref 3–14)
BUN SERPL-MCNC: 65 MG/DL (ref 7–30)
CALCIUM SERPL-MCNC: 10.3 MG/DL (ref 8.5–10.1)
CHLORIDE SERPL-SCNC: 104 MMOL/L (ref 94–109)
CO2 SERPL-SCNC: 22 MMOL/L (ref 20–32)
CREAT SERPL-MCNC: 1.76 MG/DL (ref 0.66–1.25)
GFR SERPL CREATININE-BSD FRML MDRD: 39 ML/MIN/1.7M2
GLUCOSE SERPL-MCNC: 150 MG/DL (ref 70–99)
POTASSIUM SERPL-SCNC: 4.6 MMOL/L (ref 3.4–5.3)
SODIUM SERPL-SCNC: 136 MMOL/L (ref 133–144)

## 2018-05-15 ASSESSMENT — ANXIETY QUESTIONNAIRES: GAD7 TOTAL SCORE: 0

## 2018-05-15 ASSESSMENT — PATIENT HEALTH QUESTIONNAIRE - PHQ9: SUM OF ALL RESPONSES TO PHQ QUESTIONS 1-9: 2

## 2018-05-15 NOTE — TELEPHONE ENCOUNTER
1:07 PM    Reason for Call: Phone Call    Description: Pt stated he had a call regarding his test results. Pt has some questions regarding this. Please call him back at [phone]    Was an appointment offered for this call? No  If yes : Appointment type              Date    Preferred method for responding to this message: Telephone Call  What is your phone number ?    If we cannot reach you directly, may we leave a detailed response at the number you provided? Yes    Can this message wait until your PCP/provider returns, if available today? Not applicable    Marzena Acuña

## 2018-05-22 DIAGNOSIS — I10 BENIGN ESSENTIAL HYPERTENSION: ICD-10-CM

## 2018-05-22 PROCEDURE — 36415 COLL VENOUS BLD VENIPUNCTURE: CPT | Mod: ZL

## 2018-05-22 PROCEDURE — 80048 BASIC METABOLIC PNL TOTAL CA: CPT | Mod: ZL

## 2018-05-23 LAB
ANION GAP SERPL CALCULATED.3IONS-SCNC: 13 MMOL/L (ref 3–14)
BUN SERPL-MCNC: 44 MG/DL (ref 7–30)
CALCIUM SERPL-MCNC: 10.3 MG/DL (ref 8.5–10.1)
CHLORIDE SERPL-SCNC: 98 MMOL/L (ref 94–109)
CO2 SERPL-SCNC: 20 MMOL/L (ref 20–32)
CREAT SERPL-MCNC: 1.41 MG/DL (ref 0.66–1.25)
GFR SERPL CREATININE-BSD FRML MDRD: 50 ML/MIN/1.7M2
GLUCOSE SERPL-MCNC: 112 MG/DL (ref 70–99)
POTASSIUM SERPL-SCNC: 4.1 MMOL/L (ref 3.4–5.3)
SODIUM SERPL-SCNC: 131 MMOL/L (ref 133–144)

## 2018-06-11 ENCOUNTER — TELEPHONE (OUTPATIENT)
Dept: INTERNAL MEDICINE | Facility: OTHER | Age: 68
End: 2018-06-11

## 2018-06-11 DIAGNOSIS — E11.8 TYPE 2 DIABETES MELLITUS WITH COMPLICATION, WITHOUT LONG-TERM CURRENT USE OF INSULIN (H): Primary | ICD-10-CM

## 2018-06-11 RX ORDER — METFORMIN HCL 500 MG
500 TABLET, EXTENDED RELEASE 24 HR ORAL 2 TIMES DAILY WITH MEALS
Qty: 360 TABLET | Refills: 1 | Status: SHIPPED | OUTPATIENT
Start: 2018-06-11 | End: 2018-06-11

## 2018-06-11 RX ORDER — METFORMIN HCL 500 MG
1000 TABLET, EXTENDED RELEASE 24 HR ORAL 2 TIMES DAILY WITH MEALS
Qty: 360 TABLET | Refills: 1 | Status: SHIPPED | OUTPATIENT
Start: 2018-06-11 | End: 2018-11-29

## 2018-06-11 NOTE — TELEPHONE ENCOUNTER
1:23 PM    Reason for Call: Phone Call    Description: Patient would like a call back he has questions on some of the  medications he is taking,    Was an appointment offered for this call? No  If yes : Appointment type              Date    Preferred method for responding to this message: Telephone Call  What is your phone number ? [phone]    If we cannot reach you directly, may we leave a detailed response at the number you provided? Yes    Can this message wait until your PCP/provider returns, if available today? No,     Catie Gill

## 2018-06-11 NOTE — TELEPHONE ENCOUNTER
Yes, all can be side effects.  Is he up to the 100mg twice daily dosing?  If so, will change him to the extended release formulation.

## 2018-06-11 NOTE — TELEPHONE ENCOUNTER
Patient called and is thinking since he started his Metformin he has leg weakness, poor appetite,nausea.He read these are side of effects of the medication and wonders how long this will last.Updated that he may have to see MD to r/u out other concerns.He has MRI of brain tomorrow and stress test Thurs.Appt for follow up 7.2.18. Schedule sooner appt?Please advise.

## 2018-06-12 ENCOUNTER — HOSPITAL ENCOUNTER (OUTPATIENT)
Dept: MRI IMAGING | Facility: HOSPITAL | Age: 68
End: 2018-06-12
Attending: INTERNAL MEDICINE
Payer: MEDICAID

## 2018-06-12 ENCOUNTER — HOSPITAL ENCOUNTER (OUTPATIENT)
Dept: MRI IMAGING | Facility: HOSPITAL | Age: 68
Discharge: HOME | End: 2018-06-12
Attending: INTERNAL MEDICINE | Admitting: INTERNAL MEDICINE
Payer: MEDICAID

## 2018-06-12 DIAGNOSIS — R47.81 SLURRED SPEECH: ICD-10-CM

## 2018-06-12 PROCEDURE — A9585 GADOBUTROL INJECTION: HCPCS | Performed by: RADIOLOGY

## 2018-06-12 PROCEDURE — 70544 MR ANGIOGRAPHY HEAD W/O DYE: CPT | Mod: TC

## 2018-06-12 PROCEDURE — 25000128 H RX IP 250 OP 636: Performed by: RADIOLOGY

## 2018-06-12 PROCEDURE — 70553 MRI BRAIN STEM W/O & W/DYE: CPT | Mod: TC

## 2018-06-12 RX ORDER — GADOBUTROL 604.72 MG/ML
10 INJECTION INTRAVENOUS ONCE
Status: COMPLETED | OUTPATIENT
Start: 2018-06-12 | End: 2018-06-12

## 2018-06-12 RX ADMIN — GADOBUTROL 10 ML: 604.72 INJECTION INTRAVENOUS at 18:09

## 2018-06-14 ENCOUNTER — OFFICE VISIT (OUTPATIENT)
Dept: INTERNAL MEDICINE | Facility: OTHER | Age: 68
End: 2018-06-14
Attending: INTERNAL MEDICINE
Payer: MEDICAID

## 2018-06-14 VITALS
TEMPERATURE: 95.1 F | BODY MASS INDEX: 34.01 KG/M2 | OXYGEN SATURATION: 98 % | SYSTOLIC BLOOD PRESSURE: 120 MMHG | DIASTOLIC BLOOD PRESSURE: 80 MMHG | HEIGHT: 74 IN | WEIGHT: 265 LBS | HEART RATE: 71 BPM

## 2018-06-14 DIAGNOSIS — R47.81 SLURRED SPEECH: ICD-10-CM

## 2018-06-14 DIAGNOSIS — Z86.73 HISTORY OF CVA (CEREBROVASCULAR ACCIDENT): ICD-10-CM

## 2018-06-14 DIAGNOSIS — Z71.6 TOBACCO ABUSE COUNSELING: ICD-10-CM

## 2018-06-14 DIAGNOSIS — Z72.0 TOBACCO ABUSE: ICD-10-CM

## 2018-06-14 DIAGNOSIS — E78.5 HYPERLIPIDEMIA LDL GOAL <100: Primary | ICD-10-CM

## 2018-06-14 DIAGNOSIS — I10 BENIGN ESSENTIAL HYPERTENSION: ICD-10-CM

## 2018-06-14 DIAGNOSIS — R26.9 ALTERED GAIT: ICD-10-CM

## 2018-06-14 PROCEDURE — 99215 OFFICE O/P EST HI 40 MIN: CPT | Performed by: INTERNAL MEDICINE

## 2018-06-14 PROCEDURE — G0463 HOSPITAL OUTPT CLINIC VISIT: HCPCS | Performed by: INTERNAL MEDICINE

## 2018-06-14 RX ORDER — ATORVASTATIN CALCIUM 40 MG/1
40 TABLET, FILM COATED ORAL DAILY
Qty: 30 TABLET | Refills: 1 | Status: SHIPPED | OUTPATIENT
Start: 2018-06-14 | End: 2018-08-09

## 2018-06-14 ASSESSMENT — ANXIETY QUESTIONNAIRES
1. FEELING NERVOUS, ANXIOUS, OR ON EDGE: NOT AT ALL
3. WORRYING TOO MUCH ABOUT DIFFERENT THINGS: NOT AT ALL
2. NOT BEING ABLE TO STOP OR CONTROL WORRYING: NOT AT ALL
5. BEING SO RESTLESS THAT IT IS HARD TO SIT STILL: NOT AT ALL
6. BECOMING EASILY ANNOYED OR IRRITABLE: NOT AT ALL
4. TROUBLE RELAXING: NOT AT ALL
7. FEELING AFRAID AS IF SOMETHING AWFUL MIGHT HAPPEN: NOT AT ALL
GAD7 TOTAL SCORE: 0

## 2018-06-14 ASSESSMENT — PAIN SCALES - GENERAL: PAINLEVEL: NO PAIN (0)

## 2018-06-14 NOTE — MR AVS SNAPSHOT
After Visit Summary   6/14/2018    Deon Culver    MRN: [id number]           Patient Information     Date Of Birth          1950        Visit Information        Provider Department      6/14/2018 10:00 AM Sreedhar Mendieta DO [facility]        Today's Diagnoses     Hyperlipidemia LDL goal <100    -  1    Tobacco abuse        Tobacco abuse counseling        History of CVA (cerebrovascular accident)          Care Instructions    Quit smoking  Start Lipitor 40 mg   Increase Asa to 325 mg PO daily  BP at goal  Neurology referral       HOW TO QUIT SMOKING  Smoking is one of the hardest habits to break. About half of all those who have ever smoked have been able to quit, and most of those (about 70%) who still smoke want to quit. Here are some of the best ways to stop smoking.     KEEP TRYING:  It takes most smokers about 8 tries before they are finally able to fully quit. So, the more often you try and fail, the better your chance of quitting the next time! So, don't give up!    GO COLD TURKEY:  Most ex-smokers quit cold turkey. Trying to cut back gradually doesn't seem to work as well, perhaps because it continues the smoking habit. Also, it is possible to fool yourself by inhaling more while smoking fewer cigarettes. This results in the same amount of nicotine in your body!    GET SUPPORT:  Support programs can make an important difference, especially for the heavy smoker. These groups offer lectures, methods to change your behavior and peer support. Call the free national Quitline for more information. 800-QUIT-NOW (360-104-9699). Low-cost or free programs are offered by many hospitals, local chapters of the American Lung Association (421-143-1338) and the American Cancer Society (942-445-3469). Support at home is important too. Non-smokers can help by offering praise and encouragement. If the smoker fails to quit, encourage them to try again!    OVER-THE-COUNTER  MEDICINES:  For those who can't quit on their own, Nicotine Replacement Therapy (NRT) may make quitting much easier. Certain aids such as the nicotine patch, gum and lozenge are available without a prescription. However, it is best to use these under the guidance of your doctor. The skin patch provides a steady supply of nicotine to the body. Nicotine gum and lozenge gives temporary bursts of low levels of nicotine. Both methods take the edge off the craving for cigarettes. WARNING: If you feel symptoms of nicotine overdose, such as nausea, vomiting, dizziness, weakness, or fast heartbeat, stop using these and see your doctor.    PRESCRIPTION MEDICINES:  After evaluating your smoking patterns and prior attempts at quitting, your doctor may offer a prescription medicine such as bupropion (Zyban, Wellbutrin), varenicline (Chantix, Champix), a niocotine inhaler or nasal spray. Each has its unique advantage and side effects which your doctor can review with you.    HEALTH BENEFITS OF QUITTING:  The benefits of quitting start right away and keep improving the longer you go without smokin minutes: blood pressure and pulse return to normal  8 hours: oxygen levels return to normal  2 days: ability to smell and taste begins to improve as damaged nerves start to regrow  2-3 weeks: circulation and lung function improves  1-9 months: decreased cough, congestion and shortness of breath; less tired  1 year: risk of heart attack decreases by half  5 years: risk of lung cancer decreases by half; risk of stroke becomes the same as a non-smoker  For information about how to quit smoking, visit the following links:  [employer] ,   Clearing the Air, Quit Smoking Today   - an online booklet. http://www.smokefree.gov/pubs/clearing_the_air.pdf  Smokefree.gov http://smokefree.gov/  QuitNet http://www.quitnet.com/    5095-1865 Faith Ordo[employer]ez, [address] [place] [address], [place] [address]. All rights reserved. This  information is not intended as a substitute for professional medical care. Always follow your healthcare professional's instructions.    The Benefits of Living Smoke Free  What do you want to gain from quitting? Check off some reasons to quit.  Health Benefits  ___ Reduce my risk of lung cancer, heart disease, chronic lung disease  ___ Have fewer wrinkles and softer skin  ___ Improve my sense of taste and smell  ___ For pregnant women--reduce the risk of having a miscarriage, stillbirth, premature birth, or low-birth-weight baby  Personal Benefits  ___ Feel more in control of my life  ___ Have better-smelling hair, breath, clothes, home, and car  ___ Save time by not having to take smoke breaks, buy cigarettes, or hunt for a light  ___ Have whiter teeth  Family Benefits  ___ Reduce my children s respiratory tract infections  ___ Set a good example for my children  ___ Reduce my family s cancer risk  Financial Benefits  ___ Save hundreds of dollars each year that would be spent on cigarettes  ___ Save money on medical bills  ___ Save on life, health, and car insurance premiums    Those Dollars Add Up!  Cigarettes are expensive, and getting more expensive all the time. Do you realize how much money you are spending on cigarettes per year? What is the average amount you spend on a pack of cigarettes? What is the average number of packs that you smoke per day? Using your answers to these questions, fill in this formula to help you find out:  ($ _____ per pack) ×  ( _____ number of packs per day) × (365 days) =  $ _____ yearly cost of smoking  Besides tobacco, there are other costs, including extra cleaning bills and replacement costs for clothing and furniture; medical expenses for smoking-related illnesses; and higher health, life, and car insurance premiums.    Cigars and Pipes Count Too!  Cigars and pipes are also dangerous. So are smokeless (chewing) tobacco and snuff. All of these products contain nicotine, a  highly addictive substance that has harmful effects on your body. Quitting smoking means giving up all tobacco products.      2842-7284 [facility], [address] [place] [address], [place] [address]. All rights reserved. This information is not intended as a substitute for professional medical care. Always follow your healthcare professional's instructions.          Follow-ups after your visit        Additional Services     NEUROLOGY ADULT REFERRAL       Your provider has referred you for the following:   Neurology [facility]        Please be aware that coverage of these services is subject to the terms and limitations of your health insurance plan.  Call member services at your health plan with any benefit or coverage questions.      Please bring the following with you to your appointment:    (1) Any X-Rays, CTs or MRIs which have been performed.  Contact the facility where they were done to arrange for  prior to your scheduled appointment.    (2) List of current medications  (3) This referral request   (4) Any documents/labs given to you for this referral                  Your next 10 appointments already scheduled     Jul 02, 2018  3:30 PM CDT   (Arrive by 3:15 PM)   Office Visit with Sreedhar Mendieta DO   [facility] ([facility] )    [address] [place] [address]  [facility] [id number] [phone]           Bring a current list of meds and any records pertaining to this visit. For Physicals, please bring immunization records and any forms needing to be filled out. Please arrive 10 minutes early to complete paperwork.              Who to contact     If you have questions or need follow up information about today's clinic visit or your schedule please contact [facility] directly at [phone].  Normal or non-critical lab and imaging results will be communicated to you by MyChart, letter or phone within 4 business days after the clinic has received the  "results. If you do not hear from us within 7 days, please contact the clinic through [employer] or phone. If you have a critical or abnormal lab result, we will notify you by phone as soon as possible.  Submit refill requests through [employer] or call your pharmacy and they will forward the refill request to us. Please allow 3 business days for your refill to be completed.          Additional Information About Your Visit        [employer] Information     [employer] gives you secure access to your electronic health record. If you see a primary care provider, you can also send messages to your care team and make appointments. If you have questions, please call your primary care clinic.  If you do not have a primary care provider, please call [phone] and they will assist you.        Care EveryWhere ID     This is your Care EveryWhere ID. This could be used by other organizations to access your [facility] medical records  [id number]        Your Vitals Were     Pulse Temperature Height Pulse Oximetry BMI (Body Mass Index)       71 95.1  F (35.1  C) (Tympanic) 6' 2\" (1.88 m) 98% 34.02 kg/m2        Blood Pressure from Last 3 Encounters:   06/14/18 120/80   05/14/18 138/88   04/30/18 (!) 160/110    Weight from Last 3 Encounters:   06/14/18 265 lb (120.2 kg)   05/14/18 276 lb (125.2 kg)   04/30/18 280 lb (127 kg)              We Performed the Following     NEUROLOGY ADULT REFERRAL     Tobacco Cessation - Order to Satisfy Health Maintenance          Today's Medication Changes          These changes are accurate as of 6/14/18 10:52 AM.  If you have any questions, ask your nurse or doctor.               Start taking these medicines.        Dose/Directions    atorvastatin 40 MG tablet   Commonly known as:  LIPITOR   Used for:  Hyperlipidemia LDL goal <100   Started by:  Sreedhar Mendieta DO        Dose:  40 mg   Take 1 tablet (40 mg) by mouth daily   Quantity:  30 tablet   Refills:  1         Stop taking these medicines if you " haven't already. Please contact your care team if you have questions.     cyanocobalamin 1000 MCG tablet   Commonly known as:  vitamin  B-12   Stopped by:  Sreedhar Mendieta DO           hydrocortisone 0.2 % cream   Commonly known as:  WESTCORT   Stopped by:  Sreedhar Mendieta DO                Where to get your medicines      These medications were sent to [id number] - [place], [place] - [address]   [address] , [facility] [address]     Phone:  [phone]     atorvastatin 40 MG tablet                Primary Care Provider Office Phone # Fax #    Sreedhar Mendieta [phone]       [address] [place] [address]        Equal Access to Services     [facility] : Hadii bonnie rojaso Sodirk, waaxda luqadaha, qaybta kaalmada adeegyada, fidel mckeon . So [facility] [phone].    ATENCIÓN: Si habla español, tiene a rodriguez disposición servicios gratuitos de asistencia lingüística. [facility] [phone].    We comply with applicable federal civil rights laws and [place] laws. We do not discriminate on the basis of race, color, national origin, age, disability, sex, sexual orientation, or gender identity.            Thank you!     Thank you for choosing [facility]  for your care. Our goal is always to provide you with excellent care. Hearing back from our patients is one way we can continue to improve our services. Please take a few minutes to complete the written survey that you may receive in the mail after your visit with us. Thank you!             Your Updated Medication List - Protect others around you: Learn how to safely use, store and throw away your medicines at www.disposemymeds.org.          This list is accurate as of 6/14/18 10:52 AM.  Always use your most recent med list.                   Brand Name Dispense Instructions for use Diagnosis    ASPIRIN PO      Take 81 mg by mouth daily         atorvastatin 40 MG tablet    LIPITOR    30 tablet    Take 1 tablet (40 mg) by mouth daily    Hyperlipidemia LDL goal <100       CINNAMON PO      Take 1,000 mg by mouth        Garlic 1000 MG Caps           hydrochlorothiazide 25 MG tablet    HYDRODIURIL    30 tablet    Take 1 tablet (25 mg) by mouth daily    Benign essential hypertension       lisinopril 40 MG tablet    PRINIVIL/ZESTRIL    30 tablet    Take 1 tablet (40 mg) by mouth daily    Benign essential hypertension       metFORMIN 500 MG 24 hr tablet    GLUCOPHAGE-XR    360 tablet    Take 2 tablets (1,000 mg) by mouth 2 times daily (with meals)    Type 2 diabetes mellitus with complication, without long-term current use of insulin (H)       metoprolol tartrate 50 MG tablet    LOPRESSOR    60 tablet    Take 1 tablet (50 mg) by mouth 2 times daily    Benign essential hypertension       tamsulosin 0.4 MG capsule    FLOMAX    90 capsule    Take 1 capsule (0.4 mg) by mouth daily    Benign prostatic hyperplasia, unspecified whether lower urinary tract symptoms present       TURMERIC CURCUMIN PO      Take 1,500 mg by mouth        Vitamin D-3 1000 units Caps      Take 1,000 Units by mouth

## 2018-06-14 NOTE — PROGRESS NOTES
SUBJECTIVE:   Deon Culver is a 68 year old male who presents to clinic today for the following health issues:      results      Duration: 6/12/18    Description (location/character/radiation): pt here to discuss results from 6/12/18 MRI/MRA of brain    Intensity:  n/a    Accompanying signs and symptoms: n/a    History (similar episodes/previous evaluation): None    Precipitating or alleviating factors: None    Therapies tried and outcome: None     Mendoza presents today with his daughter and significant other to discuss his recent MRI findings.  Mendoza has had stuttering symptoms for several weeks including weakness, unsteady gait and slurred speech.  He feels as though these symptoms have improved but has days where they are worse also.  Unfortunately lack of insurance has hindered the timeliness of his healthcare and managing his concerns and findings has been a challenge.  Nevertheless he did complete an MRI yesterday which is concerning for possibly evolving CVA and may correlate to his stuttering symptoms over the recent weeks.        Problem list and histories reviewed & adjusted, as indicated.  Additional history: as documented    Patient Active Problem List   Diagnosis     Benign essential hypertension     Obesity     Type 2 diabetes mellitus with complication, without long-term current use of insulin (H)     Chronic fatigue     Slurred speech x 3 months, CT neg ED [facility] 4/25/18     Vision changes x 3 months as of 4/18     Chronic bilateral low back pain with sciatica     Altered gait     Altered mental status - since approx 1/18 - delayed response, change in mentation     History reviewed. No pertinent surgical history.    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      Family History   Problem Relation Age of Onset     Colon  Cancer Mother      Kidney Cancer Father      KIDNEY DISEASE Father      Coronary Artery Disease Brother          Current Outpatient Prescriptions   Medication Sig Dispense Refill     ASPIRIN PO Take 81 mg by mouth daily       atorvastatin (LIPITOR) 40 MG tablet Take 1 tablet (40 mg) by mouth daily 30 tablet 1     Cholecalciferol (VITAMIN D-3) 1000 units CAPS Take 1,000 Units by mouth       CINNAMON PO Take 1,000 mg by mouth       Garlic 1000 MG CAPS        hydrochlorothiazide (HYDRODIURIL) 25 MG tablet Take 1 tablet (25 mg) by mouth daily 30 tablet 1     lisinopril (PRINIVIL/ZESTRIL) 40 MG tablet Take 1 tablet (40 mg) by mouth daily 30 tablet 3     metFORMIN (GLUCOPHAGE-XR) 500 MG 24 hr tablet Take 2 tablets (1,000 mg) by mouth 2 times daily (with meals) 360 tablet 1     metoprolol tartrate (LOPRESSOR) 50 MG tablet Take 1 tablet (50 mg) by mouth 2 times daily 60 tablet 1     tamsulosin (FLOMAX) 0.4 MG capsule Take 1 capsule (0.4 mg) by mouth daily 90 capsule 3     TURMERIC CURCUMIN PO Take 1,500 mg by mouth       No Known Allergies  Recent Labs   Lab Test  05/22/18   1600  05/14/18   1555  04/30/18   1048   LDL   --    --   86   HDL   --    --   50   TRIG   --    --   196*   CR  1.41*  1.76*   --    GFRESTIMATED  50*  39*   --    GFRESTBLACK  61  47*   --    POTASSIUM  4.1  4.6   --    TSH   --    --   1.43      BP Readings from Last 3 Encounters:   06/14/18 120/80   05/14/18 138/88   04/30/18 (!) 160/110    Wt Readings from Last 3 Encounters:   06/14/18 265 lb (120.2 kg)   05/14/18 276 lb (125.2 kg)   04/30/18 280 lb (127 kg)          Reviewed and updated as needed this visit by clinical staff       Reviewed and updated as needed this visit by Provider       ROS:  Constitutional, HEENT, cardiovascular, pulmonary, GI, , musculoskeletal, neuro, skin, endocrine and psych systems are negative, except as otherwise noted.    OBJECTIVE:     /80 (BP Location: Left arm, Patient Position: Sitting, Cuff Size: Adult  "Large)  Pulse 71  Temp 95.1  F (35.1  C) (Tympanic)  Ht 6' 2\" (1.88 m)  Wt 265 lb (120.2 kg)  SpO2 98%  BMI 34.02 kg/m2  Body mass index is 34.02 kg/(m^2).   GENERAL: Alert and no distress  EYES: Eyes grossly normal to inspection, PERRL and conjunctivae and sclerae normal   NECK: no adenopathy, no asymmetry, masses, or scars and thyroid normal to palpation, NO bruits.    RESP: lungs clear to auscultation - no rales, rhonchi or wheezes  CV: regular rate and rhythm, normal S1 S2, no S3 or S4, no murmur, click or rub, no peripheral edema and peripheral pulses strong  ABDOMEN: Morbidly soft, nontender  MS: +2 LE edema    SKIN: no suspicious lesions or rashes  NEURO: Slightly slurred speech but improved from previous visit.  CN 2-12 intact.  Gait is slowed and unsteady.    PSYCH: mentation appears normal, speech slightly slurred.  Patient pleasant but somewhat ambivalent as to his situation.      Diagnostic Test Results:  No results found for this or any previous visit (from the past 24 hour(s)).  Results for orders placed or performed during the hospital encounter of 06/12/18   MR Brain w/o & w Contrast    Narrative    PROCEDURE: MR BRAIN W/O & W CONTRAST 6/12/2018 6:25 PM    HISTORY: ; Slurred speech    COMPARISONS: None.    Meds/Dose Given: Gadavist  10 mL    TECHNIQUE: Images were obtained of axially diffusion gradient echo  FLAIR T1 and T2-weighted images were obtained sagittally T1 weighted  images were obtained sagittally axially and coronally T1 weighted  following gadolinium administration    FINDINGS: There is abnormal low density seen in what appears to be a  vascular distribution in the medulla and olivary nucleus on the right.  There is slight associated mass effect. This most likely represents an  evolving infarct of the brainstem. In light of the mass effect and  would recommend 3 month follow-up to exclude the possibility of a mass  in this region. No areas of pathologic enhancement are seen. There " is  some enlargement of the ventricular system consistent with atrophy.  All vessel changes are seen in both cerebral hemispheres as well as  within the brainstem. The pituitary and optic chiasm appear normal.  The basal cisterns appear normal. The paranasal sinuses are clear.         Impression    IMPRESSION: Area of abnormal bright signal on the T2-weighted images  in the ventral medulla on the right at the level of the olivary  nucleus. There is slight associated mass effect and this most likely  represents an evolving infarct.      HERSON SANTOS MD       ASSESSMENT/PLAN:     Problem List Items Addressed This Visit     Benign essential hypertension: BP at goal.      Slurred speech x 3 months, CT neg ED [facility] 4/25/18    Altered gait:  Concern as to evolving CVA on imaging.  Increase Asa to 325 mg PO daily and start Lipitor 40 mg PO daily.        Other Visit Diagnoses     Hyperlipidemia LDL goal <100    -  Primary    Relevant Medications    atorvastatin (LIPITOR) 40 MG tablet    Tobacco abuse        Relevant Orders    Tobacco Cessation - Order to Satisfy Health Maintenance (Completed)    Tobacco abuse counseling:  Patient was educated on the importance of smoking cessation.          History of CVA (cerebrovascular accident)        Relevant Orders    NEUROLOGY ADULT REFERRAL: Evolving CVA with variable stuttering symptoms over recent weeks.  ASAP.  Further recommendations as to medication regimen, testing and possible intervention requested.         Quit smoking  Start Lipitor 40 mg   Increase Asa to 325 mg PO daily  BP at goal  Neurology referral ASAP    40+ minutes was spent on this patient's care today.  Greater than 50% of the time was spent face to face with the patient and his significant other and daughter.  MRI images were reviewed and care plan was discussed.  Statin started and Asa increased to 325 mg PO daily.  Recommendation for Neurology evaluation was give as to concern for evolving CVA and  stuttering TIA/CVA symptoms over the last 2 months.  Numerous questions were answered.  Care plan was set forth and communication was also sent to Neurology specialists.      Sreedhar Mendieta, [facility]

## 2018-06-14 NOTE — PATIENT INSTRUCTIONS
Quit smoking  Start Lipitor 40 mg   Increase Asa to 325 mg PO daily  BP at goal  Neurology referral ASAP      HOW TO QUIT SMOKING  Smoking is one of the hardest habits to break. About half of all those who have ever smoked have been able to quit, and most of those (about 70%) who still smoke want to quit. Here are some of the best ways to stop smoking.     KEEP TRYING:  It takes most smokers about 8 tries before they are finally able to fully quit. So, the more often you try and fail, the better your chance of quitting the next time! So, don't give up!    GO COLD TURKEY:  Most ex-smokers quit cold turkey. Trying to cut back gradually doesn't seem to work as well, perhaps because it continues the smoking habit. Also, it is possible to fool yourself by inhaling more while smoking fewer cigarettes. This results in the same amount of nicotine in your body!    GET SUPPORT:  Support programs can make an important difference, especially for the heavy smoker. These groups offer lectures, methods to change your behavior and peer support. Call the free national Quitline for more information. 800-QUIT-NOW (500-493-0866). Low-cost or free programs are offered by many hospitals, local chapters of the American Lung Association (355-468-7037) and the American Cancer Society (115-080-4395). Support at home is important too. Non-smokers can help by offering praise and encouragement. If the smoker fails to quit, encourage them to try again!    OVER-THE-COUNTER MEDICINES:  For those who can't quit on their own, Nicotine Replacement Therapy (NRT) may make quitting much easier. Certain aids such as the nicotine patch, gum and lozenge are available without a prescription. However, it is best to use these under the guidance of your doctor. The skin patch provides a steady supply of nicotine to the body. Nicotine gum and lozenge gives temporary bursts of low levels of nicotine. Both methods take the edge off the craving for cigarettes.  WARNING: If you feel symptoms of nicotine overdose, such as nausea, vomiting, dizziness, weakness, or fast heartbeat, stop using these and see your doctor.    PRESCRIPTION MEDICINES:  After evaluating your smoking patterns and prior attempts at quitting, your doctor may offer a prescription medicine such as bupropion (Zyban, Wellbutrin), varenicline (Chantix, Champix), a niocotine inhaler or nasal spray. Each has its unique advantage and side effects which your doctor can review with you.    HEALTH BENEFITS OF QUITTING:  The benefits of quitting start right away and keep improving the longer you go without smokin minutes: blood pressure and pulse return to normal  8 hours: oxygen levels return to normal  2 days: ability to smell and taste begins to improve as damaged nerves start to regrow  2-3 weeks: circulation and lung function improves  1-9 months: decreased cough, congestion and shortness of breath; less tired  1 year: risk of heart attack decreases by half  5 years: risk of lung cancer decreases by half; risk of stroke becomes the same as a non-smoker  For information about how to quit smoking, visit the following links:  National Cancer [place] ,   Clearing the Air, Quit Smoking Today   - an online booklet. http://www.smokefree.gov/pubs/clearing_the_air.pdf  Smokefree.gov http://smokefree.gov/  QuitNet http://www.quitnet.com/    6527-1977 [employer], [address] [place] [address]. All rights reserved. This information is not intended as a substitute for professional medical care. Always follow your healthcare professional's instructions.    The Benefits of Living Smoke Free  What do you want to gain from quitting? Check off some reasons to quit.  Health Benefits  ___ Reduce my risk of lung cancer, heart disease, chronic lung disease  ___ Have fewer wrinkles and softer skin  ___ Improve my sense of taste and smell  ___ For pregnant women--reduce the risk of having a miscarriage,  stillbirth, premature birth, or low-birth-weight baby  Personal Benefits  ___ Feel more in control of my life  ___ Have better-smelling hair, breath, clothes, home, and car  ___ Save time by not having to take smoke breaks, buy cigarettes, or hunt for a light  ___ Have whiter teeth  Family Benefits  ___ Reduce my children s respiratory tract infections  ___ Set a good example for my children  ___ Reduce my family s cancer risk  Financial Benefits  ___ Save hundreds of dollars each year that would be spent on cigarettes  ___ Save money on medical bills  ___ Save on life, health, and car insurance premiums    Those Dollars Add Up!  Cigarettes are expensive, and getting more expensive all the time. Do you realize how much money you are spending on cigarettes per year? What is the average amount you spend on a pack of cigarettes? What is the average number of packs that you smoke per day? Using your answers to these questions, fill in this formula to help you find out:  ($ _____ per pack) ×  ( _____ number of packs per day) × (365 days) =  $ _____ yearly cost of smoking  Besides tobacco, there are other costs, including extra cleaning bills and replacement costs for clothing and furniture; medical expenses for smoking-related illnesses; and higher health, life, and car insurance premiums.    Cigars and Pipes Count Too!  Cigars and pipes are also dangerous. So are smokeless (chewing) tobacco and snuff. All of these products contain nicotine, a highly addictive substance that has harmful effects on your body. Quitting smoking means giving up all tobacco products.      1601-3350 [facility], [address] [place] [address], [place] [address]. All rights reserved. This information is not intended as a substitute for professional medical care. Always follow your healthcare professional's instructions.

## 2018-06-15 ENCOUNTER — TELEPHONE (OUTPATIENT)
Dept: ULTRASOUND IMAGING | Facility: HOSPITAL | Age: 68
End: 2018-06-15

## 2018-06-15 DIAGNOSIS — Z86.73 HISTORY OF CVA (CEREBROVASCULAR ACCIDENT): Primary | ICD-10-CM

## 2018-06-15 RX ORDER — CLOPIDOGREL BISULFATE 75 MG/1
75 TABLET ORAL DAILY
Qty: 30 TABLET | Refills: 1 | Status: SHIPPED | OUTPATIENT
Start: 2018-06-15 | End: 2018-11-12

## 2018-06-15 ASSESSMENT — PATIENT HEALTH QUESTIONNAIRE - PHQ9: SUM OF ALL RESPONSES TO PHQ QUESTIONS 1-9: 0

## 2018-06-15 ASSESSMENT — ANXIETY QUESTIONNAIRES: GAD7 TOTAL SCORE: 0

## 2018-06-15 NOTE — TELEPHONE ENCOUNTER
Called patient to schedule U/S carotid, he did schedule for 06/18/18, however he is requesting a call back to why this was ordered. Thank you.

## 2018-06-18 ENCOUNTER — HOSPITAL ENCOUNTER (OUTPATIENT)
Dept: ULTRASOUND IMAGING | Facility: HOSPITAL | Age: 68
Discharge: HOME | End: 2018-06-18
Attending: INTERNAL MEDICINE | Admitting: INTERNAL MEDICINE
Payer: MEDICAID

## 2018-06-18 DIAGNOSIS — Z86.73 HISTORY OF CVA (CEREBROVASCULAR ACCIDENT): ICD-10-CM

## 2018-06-18 PROCEDURE — 93880 EXTRACRANIAL BILAT STUDY: CPT | Mod: TC

## 2018-06-18 NOTE — TELEPHONE ENCOUNTER
FUTURE VISIT INFORMATION      FUTURE VISIT INFORMATION:    Date: 06/20/2018    Time:     Location:   REFERRAL INFORMATION:    Referring provider:      Referring providers clinic:      Reason for visit/diagnosis          RECORDS STATUS      Internal Records. Epic, Care Everywhere and PACS.

## 2018-06-20 ENCOUNTER — HOSPITAL ENCOUNTER (OUTPATIENT)
Dept: CARDIOLOGY | Facility: CLINIC | Age: 68
Discharge: HOME | End: 2018-06-20
Attending: PSYCHIATRY & NEUROLOGY | Admitting: PSYCHIATRY & NEUROLOGY
Payer: MEDICAID

## 2018-06-20 ENCOUNTER — OFFICE VISIT (OUTPATIENT)
Dept: NEUROLOGY | Facility: CLINIC | Age: 68
End: 2018-06-20
Payer: MEDICAID

## 2018-06-20 ENCOUNTER — RADIANT APPOINTMENT (OUTPATIENT)
Dept: GENERAL RADIOLOGY | Facility: CLINIC | Age: 68
End: 2018-06-20
Payer: MEDICAID

## 2018-06-20 ENCOUNTER — PRE VISIT (OUTPATIENT)
Dept: NEUROLOGY | Facility: CLINIC | Age: 68
End: 2018-06-20

## 2018-06-20 ENCOUNTER — TRANSFERRED RECORDS (OUTPATIENT)
Dept: HEALTH INFORMATION MANAGEMENT | Facility: CLINIC | Age: 68
End: 2018-06-20

## 2018-06-20 ENCOUNTER — MEDICAL CORRESPONDENCE (OUTPATIENT)
Dept: HEALTH INFORMATION MANAGEMENT | Facility: CLINIC | Age: 68
End: 2018-06-20

## 2018-06-20 ENCOUNTER — TELEPHONE (OUTPATIENT)
Dept: INTERNAL MEDICINE | Facility: OTHER | Age: 68
End: 2018-06-20

## 2018-06-20 VITALS
HEART RATE: 62 BPM | WEIGHT: 267.6 LBS | HEIGHT: 74 IN | BODY MASS INDEX: 34.34 KG/M2 | DIASTOLIC BLOOD PRESSURE: 85 MMHG | SYSTOLIC BLOOD PRESSURE: 128 MMHG

## 2018-06-20 DIAGNOSIS — R29.898 LEG WEAKNESS, BILATERAL: ICD-10-CM

## 2018-06-20 DIAGNOSIS — R63.4 LOSS OF WEIGHT: ICD-10-CM

## 2018-06-20 DIAGNOSIS — R68.2 DRY MOUTH: ICD-10-CM

## 2018-06-20 DIAGNOSIS — F17.200 CURRENT SMOKER: ICD-10-CM

## 2018-06-20 DIAGNOSIS — I63.9 BRAINSTEM INFARCTION (H): ICD-10-CM

## 2018-06-20 DIAGNOSIS — R47.1 DYSARTHRIA: ICD-10-CM

## 2018-06-20 DIAGNOSIS — I63.9 CEREBROVASCULAR ACCIDENT (CVA), UNSPECIFIED MECHANISM (H): Primary | ICD-10-CM

## 2018-06-20 DIAGNOSIS — R63.4 LOSS OF WEIGHT: Primary | ICD-10-CM

## 2018-06-20 LAB
ALBUMIN SERPL-MCNC: 4.8 G/DL (ref 3.4–5)
ALP SERPL-CCNC: 70 U/L (ref 40–150)
ALT SERPL W P-5'-P-CCNC: 30 U/L (ref 0–70)
AST SERPL W P-5'-P-CCNC: 17 U/L (ref 0–45)
BASOPHILS # BLD AUTO: 0.1 10E9/L (ref 0–0.2)
BASOPHILS NFR BLD AUTO: 1.1 %
BILIRUB DIRECT SERPL-MCNC: 0.1 MG/DL (ref 0–0.2)
BILIRUB SERPL-MCNC: 0.4 MG/DL (ref 0.2–1.3)
CK SERPL-CCNC: 59 U/L (ref 30–300)
CRP SERPL-MCNC: <2.9 MG/L (ref 0–8)
DIFFERENTIAL METHOD BLD: NORMAL
EOSINOPHIL # BLD AUTO: 0.3 10E9/L (ref 0–0.7)
EOSINOPHIL NFR BLD AUTO: 3.8 %
ERYTHROCYTE [DISTWIDTH] IN BLOOD BY AUTOMATED COUNT: 12.6 % (ref 10–15)
ERYTHROCYTE [SEDIMENTATION RATE] IN BLOOD BY WESTERGREN METHOD: 10 MM/H (ref 0–20)
HCT VFR BLD AUTO: 45.5 % (ref 40–53)
HGB BLD-MCNC: 16 G/DL (ref 13.3–17.7)
IMM GRANULOCYTES # BLD: 0 10E9/L (ref 0–0.4)
IMM GRANULOCYTES NFR BLD: 0.5 %
LYMPHOCYTES # BLD AUTO: 2.2 10E9/L (ref 0.8–5.3)
LYMPHOCYTES NFR BLD AUTO: 29.4 %
MCH RBC QN AUTO: 31.7 PG (ref 26.5–33)
MCHC RBC AUTO-ENTMCNC: 35.2 G/DL (ref 31.5–36.5)
MCV RBC AUTO: 90 FL (ref 78–100)
MONOCYTES # BLD AUTO: 0.4 10E9/L (ref 0–1.3)
MONOCYTES NFR BLD AUTO: 5.4 %
NEUTROPHILS # BLD AUTO: 4.5 10E9/L (ref 1.6–8.3)
NEUTROPHILS NFR BLD AUTO: 59.8 %
NRBC # BLD AUTO: 0 10*3/UL
NRBC BLD AUTO-RTO: 0 /100
PLATELET # BLD AUTO: 213 10E9/L (ref 150–450)
PROT SERPL-MCNC: 8.7 G/DL (ref 6.8–8.8)
RBC # BLD AUTO: 5.05 10E12/L (ref 4.4–5.9)
VIT B12 SERPL-MCNC: 733 PG/ML (ref 193–986)
WBC # BLD AUTO: 7.4 10E9/L (ref 4–11)

## 2018-06-20 PROCEDURE — 93306 TTE W/DOPPLER COMPLETE: CPT

## 2018-06-20 PROCEDURE — 93306 TTE W/DOPPLER COMPLETE: CPT | Mod: 26 | Performed by: INTERNAL MEDICINE

## 2018-06-20 RX ORDER — ACYCLOVIR 200 MG/1
20 CAPSULE ORAL ONCE
Status: DISCONTINUED | OUTPATIENT
Start: 2018-06-20 | End: 2018-06-21 | Stop reason: HOSPADM

## 2018-06-20 ASSESSMENT — ENCOUNTER SYMPTOMS: NUMBNESS: 1

## 2018-06-20 ASSESSMENT — PAIN SCALES - GENERAL: PAINLEVEL: NO PAIN (0)

## 2018-06-20 NOTE — MR AVS SNAPSHOT
After Visit Summary   6/20/2018    Deon Culver    MRN: [id number]           Patient Information     Date Of Birth          1950        Visit Information        Provider Department      6/20/2018 8:00 AM Jean-Claude Hopkins MD [facility] Neurology        Today's Diagnoses     Loss of weight    -  1    Dysarthria        Brainstem infarction (H)        Leg weakness, bilateral        Current smoker        Dry mouth           Follow-ups after your visit        Follow-up notes from your care team     Return in about 4 weeks (around 7/18/2018).      Your next 10 appointments already scheduled     Jun 20, 2018 10:00 AM CDT   LAB with  LAB   [facility] ([facility])    [address] [place] [address] [phone]           Please do not eat 10-12 hours before your appointment if you are coming in fasting for labs on lipids, cholesterol, or glucose (sugar). This does not apply to pregnant women. Water, hot tea and black coffee (with nothing added) are okay. Do not drink other fluids, diet soda or chew gum.            Jun 20, 2018 10:20 AM CDT   XR CHEST 2 VIEWS with UCXR1   [facility] Xray ([facility])    [address] [place] [address] [phone]           Please bring a list of your current medicines to your exam. (Include vitamins, minerals and over-thecounter medicines.) Leave your valuables at home.  Tell your doctor if there is a chance you may be pregnant.  You do not need to do anything special for this exam.            Jun 20, 2018 11:30 AM CDT   Ech Complete with TENA   [facility] [place]  Echocardiography ([facility])    [address] [facility] [id number] [phone]           1. Please bring or wear a comfortable two-piece outfit. 2. You may eat, drink and take your normal medicines. 3. For any questions that cannot  be answered, please contact the ordering physician            Jun 27, 2018 11:00 AM CDT   (Arrive by 10:45 AM)   MR NECK W/O CONTRAST ANGIOGRAM with HIMR1   [facility] MRI ([facility] )    [address] [address] [phone]           Take your medicines as usual, unless your doctor tells you not to. Bring a list of your current medicines to your exam (including vitamins, minerals and over-the-counter drugs). Also bring the results of similar scans you may have had.  Please remove any body piercings and hair extensions before you arrive.  Follow your doctor s orders. If you do not, we may have to postpone your exam.  You may or may not receive IV contrast for this exam pending the discretion of the Radiologist.  You do not need to do anything special to prepare.  The MRI machine uses a strong magnet. Please wear clothes without metal (snaps, zippers). A sweatsuit works well, or we may give you a hospital gown.   **IMPORTANT** THE INSTRUCTIONS BELOW ARE ONLY FOR THOSE PATIENTS WHO HAVE BEEN PRESCRIBED SEDATION OR GENERAL ANESTHESIA DURING THEIR MRI PROCEDURE:  IF YOUR DOCTOR PRESCRIBED ORAL SEDATION (take medicine to help you relax during your exam):   You must get the medicine from your doctor (oral medication) before you arrive. Bring the medicine to the exam. Do not take it at home. You ll be told when to take it upon arriving for your exam.   Arrive one hour early. Bring someone who can take you home after the test. Your medicine will make you sleepy. After the exam, you may not drive, take a bus or take a taxi by yourself.  IF YOUR DOCTOR PRESCRIBED IV SEDATION:   Arrive one hour early. Bring someone who can take you home after the test. Your medicine will make you sleepy. After the exam, you may not drive, take a bus or take a taxi by yourself.   No eating 6 hours before your exam. You may have clear liquids up until 4 hours before your exam. (Clear liquids include water, clear tea,  black coffee and fruit juice without pulp.)  IF YOUR DOCTOR PRESCRIBED ANESTHESIA (be asleep for your exam):   Arrive 1 1/2 hours early. Bring someone who can take you home after the test. You may not drive, take a bus or take a taxi by yourself.   No eating 8 hours before your exam. You may have clear liquids up until 4 hours before your exam. (Clear liquids include water, clear tea, black coffee and fruit juice without pulp.)   You will spend four to five hours in the recovery room.  Please call the Imaging Department at your exam site with any questions.            Jun 27, 2018 11:30 AM CDT   Holter Monitor with HI STRESS RM1   [facility] Electrocardiology ([facility] )    [address]  [place] [id number] [phone]            Jun 29, 2018 11:30 AM CDT   Holter Monitor with HI STRESS RM1   [facility] ([facility] )    [address]  [place] [address] [phone]            Jul 02, 2018  3:30 PM CDT   (Arrive by 3:15 PM)   Office Visit with Sreedhar Mendieta DO   [facility] ([facility] )    [address] [facility] [address] [phone]           Bring a current list of meds and any records pertaining to this visit. For Physicals, please bring immunization records and any forms needing to be filled out. Please arrive 10 minutes early to complete paperwork.            Jul 09, 2018  1:30 PM CDT   (Arrive by 1:15 PM)   EMG with Ranjit Meyer MD   [facility] ([facility])    [address] [facility] [address] [facility]  [address] [facility] [address] [phone]           Do not use lotions or creams on the area to be tested. If you are on blood thinners (Warfarin, Coumadin, Lovenox, etc), please contact your primary care physician to check if it is safe to stop them 3 days prior to testing. If you have anxiety, please check with your referring physician to obtain anti-anxiety medication for the  procedure.            Jul 09, 2018  2:30 PM CDT   (Arrive by 2:15 PM)   Return Visit with Jean-Claude Hopkins MD   [facility] Neurology ([facility])    [address] [address] [phone]              Future tests that were ordered for you today     Open Future Orders        Priority Expected Expires Ordered    Vitamin B12 Routine 6/20/2018 6/20/2019 6/20/2018    Methylmalonic acid Routine  6/20/2019 6/20/2018    Echocardiogram Routine 6/20/2018 6/20/2019 6/20/2018    Holter monitor 48 hour - Adult Routine 6/20/2018 12/17/2018 6/20/2018    CBC with platelets differential Routine 6/20/2018 6/20/2019 6/20/2018    Hepatic panel Routine  6/20/2019 6/20/2018    Erythrocyte sedimentation rate auto Routine  6/20/2019 6/20/2018    CRP inflammation Routine  6/20/2019 6/20/2018    CK total Routine  6/20/2019 6/20/2018    Aldolase Routine 6/20/2018 6/21/2019 6/20/2018    X-ray Chest 2 vws* Routine 6/20/2018 6/20/2019 6/20/2018    MRA Angiogram Neck w/o Contrast Routine 6/20/2018 6/20/2019 6/20/2018    Acetylcholine modulating antibody Routine 6/20/2018 6/20/2019 6/20/2018    Acetylcholine receptor binding Routine 6/20/2018 6/20/2019 6/20/2018    Acetylcholine receptor blocking mohini Routine 6/20/2018 6/20/2019 6/20/2018    EMG - Needle EMG 2 Extremities ([address]) Routine  6/20/2019 6/20/2018            Who to contact     Please call your clinic at [phone] to:    Ask questions about your health    Make or cancel appointments    Discuss your medicines    Learn about your test results    Speak to your doctor            Additional Information About Your Visit        MyChart Information     [facility] gives you secure access to your electronic health record. If you see a primary care provider, you can also send messages to your care team and make appointments. If you have questions, please call your primary care clinic.  If you do not have a primary care provider, please  "call [phone] and they will assist you.      [employer] is an electronic gateway that provides easy, online access to your medical records. With [employer], you can request a clinic appointment, read your test results, renew a prescription or communicate with your care team.     To access your existing account, please contact your [facility] or call [phone] for assistance.        Care EveryWhere ID     This is your Care EveryWhere ID. This could be used by other organizations to access your [facility] medical records  [id number]        Your Vitals Were     Pulse Height BMI (Body Mass Index)             62 1.88 m (6' 2\") 34.36 kg/m2          Blood Pressure from Last 3 Encounters:   06/20/18 128/85   06/14/18 120/80   05/14/18 138/88    Weight from Last 3 Encounters:   06/20/18 121.4 kg (267 lb 9.6 oz)   06/14/18 120.2 kg (265 lb)   05/14/18 125.2 kg (276 lb)               Primary Care Provider Office Phone # Fax #    Sreedhar Mendieta,  [phone]       [address] [place] [address]        Equal Access to Services     ELVIA NAVA AH: Hadii bonnie rojaso Soayalaali, waaxda luqadaha, qaybta kaalmada adeegyada, fidel marshall. So [facility] [phone].    ATENCIÓN: Si habla español, tiene a rodriguez disposición servicios gratuitos de asistencia lingüística. [facility] [phone].    We comply with applicable federal civil rights laws and [place] laws. We do not discriminate on the basis of race, color, national origin, age, disability, sex, sexual orientation, or gender identity.            Thank you!     Thank you for choosing [facility]  for your care. Our goal is always to provide you with excellent care. Hearing back from our patients is one way we can continue to improve our services. Please take a few minutes to complete the written survey that you may receive in the mail after your visit with us. Thank you!             Your " Updated Medication List - Protect others around you: Learn how to safely use, store and throw away your medicines at www.disposemymeds.org.          This list is accurate as of 6/20/18  9:49 AM.  Always use your most recent med list.                   Brand Name Dispense Instructions for use Diagnosis    ASPIRIN PO      Take 81 mg by mouth daily        atorvastatin 40 MG tablet    LIPITOR    30 tablet    Take 1 tablet (40 mg) by mouth daily    Hyperlipidemia LDL goal <100       CINNAMON PO      Take 1,000 mg by mouth        clopidogrel 75 MG tablet    PLAVIX    30 tablet    Take 1 tablet (75 mg) by mouth daily    History of CVA (cerebrovascular accident)       Garlic 1000 MG Caps           hydrochlorothiazide 25 MG tablet    HYDRODIURIL    30 tablet    Take 1 tablet (25 mg) by mouth daily    Benign essential hypertension       lisinopril 40 MG tablet    PRINIVIL/ZESTRIL    30 tablet    Take 1 tablet (40 mg) by mouth daily    Benign essential hypertension       metFORMIN 500 MG 24 hr tablet    GLUCOPHAGE-XR    360 tablet    Take 2 tablets (1,000 mg) by mouth 2 times daily (with meals)    Type 2 diabetes mellitus with complication, without long-term current use of insulin (H)       metoprolol tartrate 50 MG tablet    LOPRESSOR    60 tablet    Take 1 tablet (50 mg) by mouth 2 times daily    Benign essential hypertension       tamsulosin 0.4 MG capsule    FLOMAX    90 capsule    Take 1 capsule (0.4 mg) by mouth daily    Benign prostatic hyperplasia, unspecified whether lower urinary tract symptoms present       TURMERIC CURCUMIN PO      Take 1,500 mg by mouth        Vitamin D-3 1000 units Caps      Take 1,000 Units by mouth

## 2018-06-20 NOTE — TELEPHONE ENCOUNTER
Patient daughter called and wants order for CT of neck order.They can do this at the [facility].They are there now.It needs to be faxed to #[phone]. Nataly daughter cell # is [phone].Thank you.

## 2018-06-20 NOTE — LETTER
"2018       RE: Deon Culver  [address]  [facility] [address]     Dear Colleague,    Thank you for referring your patient, Deon Culver, to the [facility] NEUROLOGY at [facility]. Please see a copy of my visit note below.    Service Date: 2018      Sreedhar Mendieta DO   [facility]    [address] [place] [address]      RE: Deon Culver   MRN: [id number]   : 1950      Dear Dr. Mendieta:      Thank you for referring Deon Culver for neurologic consultation on 2018.  The patient is a 68-year-old man who comes with a chief complaint of a stroke, slurred speech, imbalance, and leg weakness.      The patient evidently changed according to his daughter, Nataly, and his wife, Janiya.  They noticed that he started to have slurred speech about the first of the year.  He also at about the same time had some difficulty with walking.  He felt his balance was off.  He did not necessarily tie the 2 symptoms together.  He has not suffered any falls.  The patient has also complained that it is harder for him to use his legs.  He said that he has had trouble going up and down steps both.  He feels that as he goes up or comes down though he improves with time.  He has had some back pain.  He \"threw his back out\" lifting liquor a few months ago.  He said it lasted about a week.  He had some pain into the left leg, but he no longer has radicular symptoms.  He has not had pain in his midback or his neck.  He denied trouble passing his urine or stool.  There is no history of Lhermitte sign.  He has not had fasciculations.  He has had a normal diet including eating fruits, vegetables and meat.  He has lost his appetite though and he has lost a considerable amount of weight without wanting to.  He denied depression.  He has not had any vaccinations.  The patient has had no trouble thinking of what he wants to say.  He has noted too that his " "speech is slurred.  His family said after I discussed this with them that it was like he had \"marbles in his mouth.\"  He has not been using the wrong words.  He denied diplopia or visual loss, ptosis, trouble with chewing nor has he had shortness of breath.  He has had no trouble getting up and down from a chair.  He blamed his dry mouth in his medications.  He is 6 feet 2 inches tall.  He did start using portion control over a year ago and he said he has lost about 100 pounds to 260 pounds, but now over the last 2 months with the decrease in his appetite and without trying he has lost about 40 pounds.  For years, he has drank wine.  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  He estimates he would drink 1-2 fifths of wine per day.  The patient has had a history of benign essential hypertension, recent diagnosis of type 2 diabetes, chronic fatigue, which has been worse the last few months, and he has had delayed response, but otherwise no change in mentation, according to his family.  He does not feel he has any memory problems or confusion.  He has been a chronic smoker of cigars.  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  He did review his family history.  His mother had colon cancer, but basically  of old age and had an infection after surgery at 91.  His father  of renal disease and was on dialysis for the last 10 years of his life.  He was 71.  He has had 2 brothers who both have had coronary artery disease.      The patient currently is on vitamin D, oral B12, hydrochlorothiazide, lisinopril, metformin, metoprolol, Flomax, and in the last 2 weeks he was started on Lipitor and Plavix.  Earlier he had been on 81 mg of aspirin for 3 weeks and then switched to the Plavix.  He has not had any recent chest x-ray.  He denied any cough.  He did have on 2018 a TSH of 1.43.  His last LDL cholesterol was 86 and his HDL was 50.  His triglycerides were high at 196. His metabolic profile showed mild elevation of creatine and " calcium. The patient did have no other issues.  Specifically, he denied any tremor.  He has been left handed.  The patient has had trouble with his right shoulder.      The patient has no other relevant history other than he does suffer from urgency and he has had nocturia x2.  He has been diagnosed with benign prostatic hyperplasia.   The patient denied any vertigo or dizziness.  He does not have a sense of being off-kilter.  He said his balance is not worse when his eyes are shut.  He said he can still stand on either leg to get dressed.  He has not had any hearing loss.      The patient did have an MRI scan which I reviewed with him and his family.  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  He had small punctate areas of T2 signal change that appear to be old that probably did represent small vessel ischemic change.  There were no other strokes identified.  The patient did have a carotid ultrasound that showed he had normal carotid arteries.  He had an intracranial MRA which was unremarkable.      Neurologic examination revealed a pleasant man.  He is obese. BLPR is 125/82 with regular pulse. He has a large pannus.  He had normal gait.  He had trouble with tandem and had some swaying on Romberg.  The patient had no obvious fasciculations.  He did have decreased range of motion of his back for full extension and had muscle spasm involving lumbar paraspinal muscles, the right greater than the left.  The patient did have a mild list to the right.  He had normal muscle stretch reflexes except for absent brachioradialis reflexes and normal leg reflexes except for absent ankle jerks, plantar stimulation, strength testing, careful cranial nerve examination, superficial and cortical sensory testing are unremarkable.  The patient is left handed.  He had normal dexterity.  He had no extrapyramidal findings.  He did have some trouble with elevation of his right arm and he  "described himself as having a \"golfer's shoulder\".  He is able to get off the ground with a partial squat.  He may have had some weakness of both triceps muscles, but not iliopsoas muscles or other muscle groups.  He did not have cervical bruits and had a regular cardiac rhythm without gallops or murmurs.  The patient did have a gag reflex.  He had no trouble saying [place] and he had no issues with speech and did have fluent speech.      IMPRESSION:   1.  Prior history of slurred speech and probable imbalance about 01/2018 which probably does coincide with findings recently of a brainstem stroke.   2.  Prior history of low back pain with radicular left leg symptoms.   3.  Subjective leg weakness.      The patient is going to have a number of tests done now including MRA of the neck arteries.  He is going to have sedimentation rate, CRP, CPK, aldolase, and acetylcholine receptor studies for neuromuscular transmission disease.  In addition, EMG is to be done of his legs.  I reviewed with the patient and his family warning signs and risk factors for stroke.  He understands the need to have immediate evaluation in the hospital if he has any symptoms to suggest a new stroke.  The patient did promise that he would stop smoking.  He is not drinking alcohol now.  He is addressing his vascular risk factors.  There is concern about his weight loss and decreased appetite and I am going to ask that he have a chest x-ray and he may need to have further studies including CT scan of the abdomen and chest, depending on test results.      Sincerely yours,       Jean-Claude Hopkins MD      I spent 70 minutes with the patient and his family today.  Over 50% of the time this involved counseling and coordination of care.  A complete review of medical systems was done and a positive review is listed in the report above.        D: 06/26/2018   T: 06/27/2018   MT: AKA      Name:     BETO BERRY   MRN:      [id number]        " Account:      [id number]   :      1950           Service Date: 2018      Document: [id number]        Again, thank you for allowing me to participate in the care of your patient.      Sincerely,    Jean-Claude Hopkins MD

## 2018-06-21 ENCOUNTER — TELEPHONE (OUTPATIENT)
Dept: INTERNAL MEDICINE | Facility: OTHER | Age: 68
End: 2018-06-21

## 2018-06-21 ENCOUNTER — TELEPHONE (OUTPATIENT)
Dept: NEUROLOGY | Facility: CLINIC | Age: 68
End: 2018-06-21

## 2018-06-21 DIAGNOSIS — R93.1 ABNORMAL ULTRASOUND CARDIOGRAM: Primary | ICD-10-CM

## 2018-06-21 DIAGNOSIS — R06.02 SOB (SHORTNESS OF BREATH): Primary | ICD-10-CM

## 2018-06-21 LAB — ALDOLASE SERPL-CCNC: 3.1 U/L (ref 1.5–8.1)

## 2018-06-21 NOTE — TELEPHONE ENCOUNTER
----- Message from Jean-Claude Hopkins MD sent at 6/21/2018 10:19 AM CDT -----  Routine tests normal; other tests will take 10 to 14 days  ----- Message -----     From: Carlyn Bowne LPN     Sent: 6/21/2018   8:36 AM       To: Jean-Claude Hopkins MD    Patient asked about his blood work. Anything on that I can call him back about good or not?    Thank you.    Carlyn BATISTA LPN      ----- Message -----     From: Jean-Claude Hopkins MD     Sent: 6/20/2018   4:17 PM       To: Carlyn Bowen LPN    Chest xray is clear[good news]

## 2018-06-21 NOTE — TELEPHONE ENCOUNTER
----- Message from Jean-Claude Hopkins MD sent at 6/20/2018  4:17 PM CDT -----  Chest xray is clear[good news]

## 2018-06-21 NOTE — TELEPHONE ENCOUNTER
Talked with patient on the phone and told them the results that  Dr. Hopkins sent me via EPIC message. Told him that his blood tests are normal, echo normal and xray chest is clear.

## 2018-06-21 NOTE — TELEPHONE ENCOUNTER
Talked with daughter aissatou about her concerns about severe enlargement of the heart. She would like me to bring this up to Dr. Hopkins. I told her that it is important for her to follow this up with his PCP as well, since he follows him more closely. They have an appt with Dr. Hopkins coming up earlier in July and She will try to make it but since she is a  it is sometimes difficult. She is 99% sure she will be accompanying her father to this upcoming visit.

## 2018-06-21 NOTE — TELEPHONE ENCOUNTER
Pt daughter called and got results from cardiogram. Provider at the  would like Mendoza to see a cardiologist. He placed a referral for the [facility] in [place]. Daughter would like to know if they could see a cardiologist in [place] or if Dr. Mendieta prefers the [facility]. Ninoska Red LPN

## 2018-06-21 NOTE — TELEPHONE ENCOUNTER
Discussed no source for clot or emboli from heart . Holter pending; formal report suggests some ventricular wall thickening. Advised cardilogy consult.

## 2018-06-21 NOTE — TELEPHONE ENCOUNTER
Talked with patient on the phone and told them the results that  Dr. Hopkins sent me via EPIC message.  Message  Received: Yesterday       Jean-Claude Hopkins MD Burguet, Kailee, LPN                     Echo of heart is normal[good news]              ----- Message from Jean-Claude Hopkins MD sent at 6/20/2018  4:17 PM CDT -----  Chest xray is clear[good news]

## 2018-06-21 NOTE — TELEPHONE ENCOUNTER
He wanted to call his daughter Nataly which I then did to review echo report and have him see a cardiologist

## 2018-06-22 LAB
ACHR BIND AB SER-SCNC: 0 NMOL/L (ref 0–0.4)
ACHR BLOCK AB/ACHR TOTAL SFR SER: 22 % (ref 0–26)
ACHR MOD AB/ACHR TOTAL SFR SER: 0 %
METHYLMALONATE SERPL-SCNC: 0.17 UMOL/L (ref 0–0.4)

## 2018-06-24 DIAGNOSIS — I10 BENIGN ESSENTIAL HYPERTENSION: ICD-10-CM

## 2018-06-25 RX ORDER — HYDROCHLOROTHIAZIDE 25 MG/1
TABLET ORAL
Qty: 30 TABLET | Refills: 8 | Status: SHIPPED | OUTPATIENT
Start: 2018-06-25 | End: 2018-07-06

## 2018-06-25 RX ORDER — METOPROLOL TARTRATE 50 MG
TABLET ORAL
Qty: 60 TABLET | Refills: 8 | Status: SHIPPED | OUTPATIENT
Start: 2018-06-25 | End: 2019-02-26

## 2018-06-25 NOTE — TELEPHONE ENCOUNTER
[facility]      Last Written Prescription Date:  5/2/18  Last Fill Quantity: 30,   # refills: 1  Last Office Visit: 6/14/18  Future Office visit:    Next 5 appointments (look out 90 days)     Jul 02, 2018  3:30 PM CDT   (Arrive by 3:15 PM)   Office Visit with Sreedhar Mendieta DO   [facility] ([facility] )    [address]  [place] [address]   [phone]                 metoprolol      Last Written Prescription Date:  4/30/18  Last Fill Quantity: 60,   # refills: 1  Last Office Visit: 6/14/18  Future Office visit:    Next 5 appointments (look out 90 days)     Jul 02, 2018  3:30 PM CDT   (Arrive by 3:15 PM)   Office Visit with Sreedhar Mendieta DO   [facility] ([facility] )    [address]  [place] [address]   [phone]

## 2018-06-27 ENCOUNTER — HOSPITAL ENCOUNTER (OUTPATIENT)
Dept: CARDIOLOGY | Facility: HOSPITAL | Age: 68
End: 2018-06-27
Attending: PSYCHIATRY & NEUROLOGY
Payer: MEDICAID

## 2018-06-27 ENCOUNTER — HOSPITAL ENCOUNTER (OUTPATIENT)
Dept: MRI IMAGING | Facility: HOSPITAL | Age: 68
Discharge: HOME | End: 2018-06-27
Attending: PSYCHIATRY & NEUROLOGY | Admitting: PSYCHIATRY & NEUROLOGY
Payer: MEDICAID

## 2018-06-27 DIAGNOSIS — R29.898 LEG WEAKNESS, BILATERAL: ICD-10-CM

## 2018-06-27 DIAGNOSIS — F17.200 CURRENT SMOKER: ICD-10-CM

## 2018-06-27 DIAGNOSIS — R68.2 DRY MOUTH: ICD-10-CM

## 2018-06-27 DIAGNOSIS — R47.1 DYSARTHRIA: ICD-10-CM

## 2018-06-27 DIAGNOSIS — I63.9 BRAINSTEM INFARCTION (H): ICD-10-CM

## 2018-06-27 DIAGNOSIS — R63.4 LOSS OF WEIGHT: ICD-10-CM

## 2018-06-27 PROCEDURE — 70547 MR ANGIOGRAPHY NECK W/O DYE: CPT | Mod: TC

## 2018-06-27 PROCEDURE — 93227 XTRNL ECG REC<48 HR R&I: CPT | Performed by: INTERNAL MEDICINE

## 2018-06-27 PROCEDURE — 93225 XTRNL ECG REC<48 HRS REC: CPT

## 2018-06-27 NOTE — PROGRESS NOTES
"Service Date: 2018      Sreedhar Mendieta, DO   [facility]    [address] [place] [address]      RE: Deon Culver   MRN: [id number]   : 1950      Dear Dr. Mendieta:      Thank you for referring Deon Culver for neurologic consultation on 2018.  The patient is a 68-year-old man who comes with a chief complaint of a stroke, slurred speech, imbalance, and leg weakness.      The patient evidently changed according to his daughter, Nataly, and his wife, Janiya.  They noticed that he started to have slurred speech about the first of the year.  He also at about the same time had some difficulty with walking.  He felt his balance was off.  He did not necessarily tie the 2 symptoms together.  He has not suffered any falls.  The patient has also complained that it is harder for him to use his legs.  He said that he has had trouble going up and down steps both.  He feels that as he goes up or comes down though he improves with time.  He has had some back pain.  He \"threw his back out\" lifting liquor a few months ago.  He said it lasted about a week.  He had some pain into the left leg, but he no longer has radicular symptoms.  He has not had pain in his midback or his neck.  He denied trouble passing his urine or stool.  There is no history of Lhermitte sign.  He has not had fasciculations.  He has had a normal diet including eating fruits, vegetables and meat.  He has lost his appetite though and he has lost a considerable amount of weight without wanting to.  He denied depression.  He has not had any vaccinations.  The patient has had no trouble thinking of what he wants to say.  He has noted too that his speech is slurred.  His family said after I discussed this with them that it was like he had \"marbles in his mouth.\"  He has not been using the wrong words.  He denied diplopia or visual loss, ptosis, trouble with chewing nor has he had shortness of breath.  He has had no " trouble getting up and down from a chair.  He blamed his dry mouth in his medications.  He is 6 feet 2 inches tall.  He did start using portion control over a year ago and he said he has lost about 100 pounds to 260 pounds, but now over the last 2 months with the decrease in his appetite and without trying he has lost about 40 pounds.  For years, he has drank wine.  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  He estimates he would drink 1-2 fifths of wine per day.  The patient has had a history of benign essential hypertension, recent diagnosis of type 2 diabetes, chronic fatigue, which has been worse the last few months, and he has had delayed response, but otherwise no change in mentation, according to his family.  He does not feel he has any memory problems or confusion.  He has been a chronic smoker of cigars.  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  He did review his family history.  His mother had colon cancer, but basically  of old age and had an infection after surgery at 91.  His father  of renal disease and was on dialysis for the last 10 years of his life.  He was 71.  He has had 2 brothers who both have had coronary artery disease.      The patient currently is on vitamin D, oral B12, hydrochlorothiazide, lisinopril, metformin, metoprolol, Flomax, and in the last 2 weeks he was started on Lipitor and Plavix.  Earlier he had been on 81 mg of aspirin for 3 weeks and then switched to the Plavix.  He has not had any recent chest x-ray.  He denied any cough.  He did have on 2018 a TSH of 1.43.  His last LDL cholesterol was 86 and his HDL was 50.  His triglycerides were high at 196. His metabolic profile showed mild elevation of creatine and calcium. The patient did have no other issues.  Specifically, he denied any tremor.  He has been left handed.  The patient has had trouble with his right shoulder.      The patient has no other relevant history other than he does suffer from urgency and he has had nocturia  "x2.  He has been diagnosed with benign prostatic hyperplasia.   The patient denied any vertigo or dizziness.  He does not have a sense of being off-kilter.  He said his balance is not worse when his eyes are shut.  He said he can still stand on either leg to get dressed.  He has not had any hearing loss.      The patient did have an MRI scan which I reviewed with him and his family.  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  He had small punctate areas of T2 signal change that appear to be old that probably did represent small vessel ischemic change.  There were no other strokes identified.  The patient did have a carotid ultrasound that showed he had normal carotid arteries.  He had an intracranial MRA which was unremarkable.      Neurologic examination revealed a pleasant man.  He is obese. BLPR is 125/82 with regular pulse. He has a large pannus.  He had normal gait.  He had trouble with tandem and had some swaying on Romberg.  The patient had no obvious fasciculations.  He did have decreased range of motion of his back for full extension and had muscle spasm involving lumbar paraspinal muscles, the right greater than the left.  The patient did have a mild list to the right.  He had normal muscle stretch reflexes except for absent brachioradialis reflexes and normal leg reflexes except for absent ankle jerks, plantar stimulation, strength testing, careful cranial nerve examination, superficial and cortical sensory testing are unremarkable.  The patient is left handed.  He had normal dexterity.  He had no extrapyramidal findings.  He did have some trouble with elevation of his right arm and he described himself as having a \"golfer's shoulder\".  He is able to get off the ground with a partial squat.  He may have had some weakness of both triceps muscles, but not iliopsoas muscles or other muscle groups.  He did not have cervical bruits and had a regular cardiac rhythm " without gallops or murmurs.  The patient did have a gag reflex.  He had no trouble saying [place] and he had no issues with speech and did have fluent speech.      IMPRESSION:   1.  Prior history of slurred speech and probable imbalance about 2018 which probably does coincide with findings recently of a brainstem stroke.   2.  Prior history of low back pain with radicular left leg symptoms.   3.  Subjective leg weakness.      The patient is going to have a number of tests done now including MRA of the neck arteries.  He is going to have sedimentation rate, CRP, CPK, aldolase, and acetylcholine receptor studies for neuromuscular transmission disease.  In addition, EMG is to be done of his legs.  I reviewed with the patient and his family warning signs and risk factors for stroke.  He understands the need to have immediate evaluation in the hospital if he has any symptoms to suggest a new stroke.  The patient did promise that he would stop smoking.  He is not drinking alcohol now.  He is addressing his vascular risk factors.  There is concern about his weight loss and decreased appetite and I am going to ask that he have a chest x-ray and he may need to have further studies including CT scan of the abdomen and chest, depending on test results.      Sincerely yours,       Kelli Hopkins MD      I spent 70 minutes with the patient and his family today.  Over 50% of the time this involved counseling and coordination of care.  A complete review of medical systems was done and a positive review is listed in the report above.         KELLI HOPKINS MD             D: 2018   T: 2018   MT: AKA      Name:     BETO BERRY   MRN:      [id number]        Account:      [id number]   :      1950           Service Date: 2018      Document: [id number]

## 2018-06-29 ENCOUNTER — HOSPITAL ENCOUNTER (OUTPATIENT)
Dept: CARDIOLOGY | Facility: HOSPITAL | Age: 68
End: 2018-06-29
Attending: PSYCHIATRY & NEUROLOGY
Payer: MEDICAID

## 2018-06-29 ENCOUNTER — TELEPHONE (OUTPATIENT)
Dept: NEUROLOGY | Facility: CLINIC | Age: 68
End: 2018-06-29

## 2018-06-29 NOTE — TELEPHONE ENCOUNTER
----- Message from Jean-Claude Hopkins MD sent at 6/28/2018  4:46 PM CDT -----  Please tell him mra of neck is normal

## 2018-06-29 NOTE — TELEPHONE ENCOUNTER
Talked with patient on the phone and told them the results that  Dr. Hopkins sent me via EPIC message.  Told him I would put this on the mychart as well.

## 2018-07-02 ENCOUNTER — OFFICE VISIT (OUTPATIENT)
Dept: INTERNAL MEDICINE | Facility: OTHER | Age: 68
End: 2018-07-02
Attending: INTERNAL MEDICINE
Payer: MEDICARE

## 2018-07-02 VITALS
WEIGHT: 266 LBS | TEMPERATURE: 96 F | BODY MASS INDEX: 34.14 KG/M2 | SYSTOLIC BLOOD PRESSURE: 84 MMHG | HEART RATE: 83 BPM | DIASTOLIC BLOOD PRESSURE: 64 MMHG | HEIGHT: 74 IN | OXYGEN SATURATION: 98 %

## 2018-07-02 DIAGNOSIS — N18.2 CHRONIC KIDNEY DISEASE, STAGE II (MILD): ICD-10-CM

## 2018-07-02 DIAGNOSIS — R53.83 OTHER FATIGUE: Primary | ICD-10-CM

## 2018-07-02 DIAGNOSIS — I10 BENIGN ESSENTIAL HYPERTENSION: ICD-10-CM

## 2018-07-02 DIAGNOSIS — Z86.73 HISTORY OF CVA (CEREBROVASCULAR ACCIDENT): ICD-10-CM

## 2018-07-02 LAB
ALBUMIN SERPL-MCNC: 4.3 G/DL (ref 3.4–5)
ALP SERPL-CCNC: 75 U/L (ref 40–150)
ALT SERPL W P-5'-P-CCNC: 22 U/L (ref 0–70)
ANION GAP SERPL CALCULATED.3IONS-SCNC: 12 MMOL/L (ref 3–14)
AST SERPL W P-5'-P-CCNC: 9 U/L (ref 0–45)
BASOPHILS # BLD AUTO: 0 10E9/L (ref 0–0.2)
BASOPHILS NFR BLD AUTO: 0.5 %
BILIRUB SERPL-MCNC: 0.6 MG/DL (ref 0.2–1.3)
BUN SERPL-MCNC: 52 MG/DL (ref 7–30)
CALCIUM SERPL-MCNC: 10.4 MG/DL (ref 8.5–10.1)
CHLORIDE SERPL-SCNC: 103 MMOL/L (ref 94–109)
CO2 SERPL-SCNC: 21 MMOL/L (ref 20–32)
CREAT SERPL-MCNC: 1.96 MG/DL (ref 0.66–1.25)
DIFFERENTIAL METHOD BLD: ABNORMAL
EOSINOPHIL # BLD AUTO: 0.3 10E9/L (ref 0–0.7)
EOSINOPHIL NFR BLD AUTO: 3.4 %
ERYTHROCYTE [DISTWIDTH] IN BLOOD BY AUTOMATED COUNT: 13 % (ref 10–15)
GFR SERPL CREATININE-BSD FRML MDRD: 34 ML/MIN/1.7M2
GLUCOSE SERPL-MCNC: 112 MG/DL (ref 70–99)
HCT VFR BLD AUTO: 38.1 % (ref 40–53)
HGB BLD-MCNC: 13.9 G/DL (ref 13.3–17.7)
LYMPHOCYTES # BLD AUTO: 2 10E9/L (ref 0.8–5.3)
LYMPHOCYTES NFR BLD AUTO: 25 %
MCH RBC QN AUTO: 32.4 PG (ref 26.5–33)
MCHC RBC AUTO-ENTMCNC: 36.5 G/DL (ref 31.5–36.5)
MCV RBC AUTO: 89 FL (ref 78–100)
MONOCYTES # BLD AUTO: 0.6 10E9/L (ref 0–1.3)
MONOCYTES NFR BLD AUTO: 8 %
NEUTROPHILS # BLD AUTO: 5 10E9/L (ref 1.6–8.3)
NEUTROPHILS NFR BLD AUTO: 63.1 %
PLATELET # BLD AUTO: 193 10E9/L (ref 150–450)
POTASSIUM SERPL-SCNC: 4.6 MMOL/L (ref 3.4–5.3)
PROT SERPL-MCNC: 8 G/DL (ref 6.8–8.8)
RBC # BLD AUTO: 4.29 10E12/L (ref 4.4–5.9)
SODIUM SERPL-SCNC: 136 MMOL/L (ref 133–144)
WBC # BLD AUTO: 7.9 10E9/L (ref 4–11)

## 2018-07-02 PROCEDURE — 2894A VOIDCORRECT: CPT | Performed by: INTERNAL MEDICINE

## 2018-07-02 PROCEDURE — 85025 COMPLETE CBC W/AUTO DIFF WBC: CPT | Mod: ZL | Performed by: INTERNAL MEDICINE

## 2018-07-02 PROCEDURE — 99214 OFFICE O/P EST MOD 30 MIN: CPT | Performed by: INTERNAL MEDICINE

## 2018-07-02 PROCEDURE — 80053 COMPREHEN METABOLIC PANEL: CPT | Mod: ZL | Performed by: INTERNAL MEDICINE

## 2018-07-02 PROCEDURE — 36415 COLL VENOUS BLD VENIPUNCTURE: CPT | Mod: ZL | Performed by: INTERNAL MEDICINE

## 2018-07-02 PROCEDURE — G0463 HOSPITAL OUTPT CLINIC VISIT: HCPCS

## 2018-07-02 ASSESSMENT — PAIN SCALES - GENERAL: PAINLEVEL: NO PAIN (0)

## 2018-07-02 NOTE — MR AVS SNAPSHOT
After Visit Summary   7/2/2018    Deon Culver    MRN: [id number]           Patient Information     Date Of Birth          1950        Visit Information        Provider Department      7/2/2018 3:30 PM Sreedhar Mendieta,  [facility]        Today's Diagnoses     Other fatigue    -  1    Chronic kidney disease, stage II (mild)          Care Instructions    STOP Hydrochlorothiazide    Continue Asa and Plavix for now    Depending on labs potentially decrease or stop Lisinopril   Check BP:   If systolic remains lower than 100 then please contact us.            Follow-ups after your visit        Your next 10 appointments already scheduled     Jul 02, 2018  3:30 PM CDT   (Arrive by 3:15 PM)   Office Visit with Sreedhar Mendieta DO   [facility] ([facility] )    [address]  [facility] [id number] [phone]           Bring a current list of meds and any records pertaining to this visit. For Physicals, please bring immunization records and any forms needing to be filled out. Please arrive 10 minutes early to complete paperwork.            Jul 06, 2018  2:00 PM CDT   (Arrive by 1:45 PM)   New Visit with Chas Armendariz,    [facility] [place] ([facility] [place] )    [address]  [place] [address]   [phone]            Jul 09, 2018  1:30 PM CDT   (Arrive by 1:15 PM)   EMG with Ranjit Meyer MD   [facility] ([facility])    [address]  [address] [facility] [address] [phone]           Do not use lotions or creams on the area to be tested. If you are on blood thinners (Warfarin, Coumadin, Lovenox, etc), please contact your primary care physician to check if it is safe to stop them 3 days prior to testing. If you have anxiety, please check with your referring physician to obtain anti-anxiety medication for the procedure.            Jul 09,  "2018  2:30 PM CDT   (Arrive by 2:15 PM)   Return Visit with Jean-Claude Hopkins MD   [facility] ([facility])    [address]  [address] [facility] [address] [phone]              Who to contact     If you have questions or need follow up information about today's clinic visit or your schedule please contact [facility] directly at [phone].  Normal or non-critical lab and imaging results will be communicated to you by [employer], letter or phone within 4 business days after the clinic has received the results. If you do not hear from us within 7 days, please contact the clinic through [employer] or phone. If you have a critical or abnormal lab result, we will notify you by phone as soon as possible.  Submit refill requests through [employer] or call your pharmacy and they will forward the refill request to us. Please allow 3 business days for your refill to be completed.          Additional Information About Your Visit        [employer] Information     [employer] gives you secure access to your electronic health record. If you see a primary care provider, you can also send messages to your care team and make appointments. If you have questions, please call your primary care clinic.  If you do not have a primary care provider, please call [phone] and they will assist you.        Care EveryWhere ID     This is your Care EveryWhere ID. This could be used by other organizations to access your [place] medical records  [id number]        Your Vitals Were     Pulse Temperature Height Pulse Oximetry BMI (Body Mass Index)       83 96  F (35.6  C) (Tympanic) 6' 2\" (1.88 m) 98% 34.15 kg/m2        Blood Pressure from Last 3 Encounters:   07/02/18 (!) 84/64   06/20/18 128/85   06/14/18 120/80    Weight from Last 3 Encounters:   07/02/18 266 lb (120.7 kg)   06/20/18 267 lb 9.6 oz (121.4 kg)   06/14/18 265 lb (120.2 kg)              We Performed the Following     CBC " with platelets and differential     Comprehensive metabolic panel (BMP + Alb, Alk Phos, ALT, AST, Total. Bili, TP)        Primary Care Provider Office Phone # Fax #    Sreedhar DEAN Onicamila,  [phone]       [facility] [facility] [address]        Equal Access to Services     ELVIA NAVA : Hadii aad ku hadasho Soomaali, waaxda luqadaha, qaybta kaalmada adeegyada, waxay anishin hayaan adeshruthi carolдмитрий marshall. [facility] [phone].    ATENCIÓN: Si habla español, tiene a rodriguez disposición servicios gratuitos de asistencia lingüística. [facility] [phone].    We comply with applicable federal civil rights laws and [place] laws. We do not discriminate on the basis of race, color, national origin, age, disability, sex, sexual orientation, or gender identity.            Thank you!     Thank you for choosing [facility]  for your care. Our goal is always to provide you with excellent care. Hearing back from our patients is one way we can continue to improve our services. Please take a few minutes to complete the written survey that you may receive in the mail after your visit with us. Thank you!             Your Updated Medication List - Protect others around you: Learn how to safely use, store and throw away your medicines at www.disposemymeds.org.          This list is accurate as of 7/2/18  2:38 PM.  Always use your most recent med list.                   Brand Name Dispense Instructions for use Diagnosis    ASPIRIN PO      Take 81 mg by mouth daily        atorvastatin 40 MG tablet    LIPITOR    30 tablet    Take 1 tablet (40 mg) by mouth daily    Hyperlipidemia LDL goal <100       CINNAMON PO      Take 1,000 mg by mouth        clopidogrel 75 MG tablet    PLAVIX    30 tablet    Take 1 tablet (75 mg) by mouth daily    History of CVA (cerebrovascular accident)       Garlic 1000 MG Caps           hydrochlorothiazide 25 MG tablet    HYDRODIURIL    30 tablet    TAKE 1 TABLET BY MOUTH ONCE  DAILY    Benign essential hypertension       lisinopril 40 MG tablet    PRINIVIL/ZESTRIL    30 tablet    Take 1 tablet (40 mg) by mouth daily    Benign essential hypertension       metFORMIN 500 MG 24 hr tablet    GLUCOPHAGE-XR    360 tablet    Take 2 tablets (1,000 mg) by mouth 2 times daily (with meals)    Type 2 diabetes mellitus with complication, without long-term current use of insulin (H)       metoprolol tartrate 50 MG tablet    LOPRESSOR    60 tablet    TAKE 1 TABLET BY MOUTH TWICE DAILY    Benign essential hypertension       tamsulosin 0.4 MG capsule    FLOMAX    90 capsule    Take 1 capsule (0.4 mg) by mouth daily    Benign prostatic hyperplasia, unspecified whether lower urinary tract symptoms present       TURMERIC CURCUMIN PO      Take 1,500 mg by mouth        Vitamin D-3 1000 units Caps      Take 1,000 Units by mouth

## 2018-07-02 NOTE — PROGRESS NOTES
SUBJECTIVE:   Deon Culver is a 68 year old male who presents to clinic today for the following health issues:      Decreased appetite      Duration: since he started medication    Description (location/character/radiation): pt states nothing appeals to him - has been a little nauseated     Intensity:  mild    Accompanying signs and symptoms: fatigue     History (similar episodes/previous evaluation): None    Precipitating or alleviating factors: None    Therapies tried and outcome: None     Mendoza presents today for follow up after recently being seen by Neurology and with presumptive diagnosis of stroke.  His holter monitor was complete but no results are available.  MRI of neck without carotid of vertebral disease.  He has upcoming cardiology appointment and EMGs.    He questions whether he should stop his Plavix and Asa prior to EMGs as he was instructed to do so.  He also reports ongoing weakness in his legs and decreased appetite.  He denies any chest pain or falls.  He does reports some subtle dizziness at times.  His BP has generally been 100-120s systolic but today is lower.            Problem list and histories reviewed & adjusted, as indicated.  Additional history: as documented    Patient Active Problem List   Diagnosis     Benign essential hypertension     Obesity     Type 2 diabetes mellitus with complication, without long-term current use of insulin (H)     Chronic fatigue     Slurred speech x 3 months, CT neg ED [facility] 4/25/18     Vision changes x 3 months as of 4/18     Chronic bilateral low back pain with sciatica     Altered gait     Altered mental status - since approx 1/18 - delayed response, change in mentation     History reviewed. No pertinent surgical history.    Social History   Substance Use Topics     Smoking status: Former Smoker     Packs/day: 0.50     Types: Cigars     Start date: 1/1/2003     Quit date: 6/14/2018     Smokeless tobacco: Never Used     Alcohol use No      Comment:  aidan 8 months      Family History   Problem Relation Age of Onset     Colon Cancer Mother      Kidney Cancer Father      KIDNEY DISEASE Father      Coronary Artery Disease Brother          Current Outpatient Prescriptions   Medication Sig Dispense Refill     ASPIRIN PO Take 81 mg by mouth daily       atorvastatin (LIPITOR) 40 MG tablet Take 1 tablet (40 mg) by mouth daily 30 tablet 1     Cholecalciferol (VITAMIN D-3) 1000 units CAPS Take 1,000 Units by mouth       CINNAMON PO Take 1,000 mg by mouth       clopidogrel (PLAVIX) 75 MG tablet Take 1 tablet (75 mg) by mouth daily 30 tablet 1     Garlic 1000 MG CAPS        hydrochlorothiazide (HYDRODIURIL) 25 MG tablet TAKE 1 TABLET BY MOUTH ONCE DAILY 30 tablet 8     lisinopril (PRINIVIL/ZESTRIL) 40 MG tablet Take 1 tablet (40 mg) by mouth daily 30 tablet 3     metFORMIN (GLUCOPHAGE-XR) 500 MG 24 hr tablet Take 2 tablets (1,000 mg) by mouth 2 times daily (with meals) 360 tablet 1     metoprolol tartrate (LOPRESSOR) 50 MG tablet TAKE 1 TABLET BY MOUTH TWICE DAILY 60 tablet 8     tamsulosin (FLOMAX) 0.4 MG capsule Take 1 capsule (0.4 mg) by mouth daily 90 capsule 3     TURMERIC CURCUMIN PO Take 1,500 mg by mouth       No Known Allergies  Recent Labs   Lab Test  06/20/18   1340  06/20/18   1017  05/22/18   1600  05/14/18   1555   04/30/18   1048   LDL   --    --    --    --    --   86   HDL   --    --    --    --    --   50   TRIG   --    --    --    --    --   196*   ALT   --   30   --    --    --    --    CR   --    --   1.41*  1.76*   --    --    GFRESTIMATED  26*   --   50*  39*   < >   --    GFRESTBLACK  31*   --   61  47*   < >   --    POTASSIUM   --    --   4.1  4.6   --    --    TSH   --    --    --    --    --   1.43    < > = values in this interval not displayed.      BP Readings from Last 3 Encounters:   07/02/18 (!) 84/64   06/20/18 128/85   06/14/18 120/80    Wt Readings from Last 3 Encounters:   07/02/18 266 lb (120.7 kg)   06/20/18 267 lb 9.6 oz (121.4 kg)  "  06/14/18 265 lb (120.2 kg)            Reviewed and updated as needed this visit by clinical staff       Reviewed and updated as needed this visit by Provider       ROS:  Constitutional, HEENT, cardiovascular, pulmonary, gi and gu systems are negative, except as otherwise noted.    OBJECTIVE:     BP (!) 84/64 (BP Location: Left arm, Patient Position: Sitting, Cuff Size: Adult Large)  Pulse 83  Temp 96  F (35.6  C) (Tympanic)  Ht 6' 2\" (1.88 m)  Wt 266 lb (120.7 kg)  SpO2 98%  BMI 34.15 kg/m2  Body mass index is 34.15 kg/(m^2).   GENERAL: Alert and no distress  RESP: lungs clear to auscultation - no rales, rhonchi or wheezes  CV: regular rate and rhythm, normal S1 S2, no S3 or S4, no murmur, click or rub, no peripheral edema and peripheral pulses strong  MS: +2 Le edema    SKIN: no suspicious lesions or rashes  NEURO: Normal strength and tone, mentation intact and speech normal  PSYCH: mentation appears normal, affect normal/bright            Diagnostic Test Results:  PROCEDURE: MRA ANGIOGRAM NECK W/O CONTRAST 6/27/2018 11:47 AM     HISTORY: ; Brainstem infarction (H); Loss of weight; Dysarthria; Leg  weakness, bilateral; Current smoker; Dry mouth     COMPARISONS: None.     Meds/Dose Given:     TECHNIQUE: MR angiogram of the neck was performed using the 2-D  time-of-flight method.     FINDINGS: The brachiocephalic artery appears normal the right common  and right internal carotid arteries are normal. The left common and  left internal carotid artery is normal. The origins of the vertebral  arteries were not well visualized. More distally no vertebral artery  stenoses are seen.          IMPRESSION: On a 2-D time-of-flight exam no  carotid or vertebral  stenosis or occlusion is seen in the neck.     HERSON SANTOS MD      ASSESSMENT/PLAN:     Problem List Items Addressed This Visit     None      Visit Diagnoses     Other fatigue    -  Primary    Relevant Orders    CBC with platelets and differential " (Completed)    Chronic kidney disease, stage II (mild)        Relevant Orders    Comprehensive metabolic panel (BMP + Alb, Alk Phos, ALT, AST, Total. Bili, TP) (Completed)         Loss of appetite may be from Metformin.  Continue to monitor.   STOP Hydrochlorothiazide    Continue Asa and Plavix for now    Depending on labs potentially decrease or stop Lisinopril   Check BP:   If systolic remains lower than 100 then please contact us      Sreedhar Mendieta,   [facility]

## 2018-07-02 NOTE — PATIENT INSTRUCTIONS
STOP Hydrochlorothiazide    Continue Asa and Plavix for now    Depending on labs potentially decrease or stop Lisinopril   Check BP:   If systolic remains lower than 100 then please contact us.

## 2018-07-03 ENCOUNTER — TELEPHONE (OUTPATIENT)
Dept: NEUROLOGY | Facility: CLINIC | Age: 68
End: 2018-07-03

## 2018-07-03 NOTE — TELEPHONE ENCOUNTER
----- Message from Eric Hutson MD sent at 7/3/2018  7:51 AM CDT -----  Please advise pt not to hold aspirin or plavix for EMG. jf  ----- Message -----     From: Ranjit Meyer MD     Sent: 7/2/2018   4:55 PM       To: Sreedhar Mendieta DO, Obdulio Petty MD, #    Please do NOT hold his aspirin or plavix. These are completely fine for the EMG.       ----- Message -----     From: Obdulio Petty MD     Sent: 7/2/2018   2:51 PM       To: Ranjit Meyer MD, Jamee Kolb RN, #    Fernando Is it OK with you to do EMG with patient on asa and Plavix?   ----- Message -----     From: Sreedhar Mendieta DO     Sent: 7/2/2018   2:26 PM       To: Ranjit Meyer MD, Jean-Claude Hopkins MD    Wes Donaldson is scheduled for EMG on 7/9/18 and there is a request to hold Asa and Plavix.  I would prefer not to hold this due to recent CVA unless there is significant concern for bleeding with this procedure.      Regards-    Sreedhar Mendieta

## 2018-07-06 ENCOUNTER — TELEPHONE (OUTPATIENT)
Dept: NEUROLOGY | Facility: CLINIC | Age: 68
End: 2018-07-06

## 2018-07-06 ENCOUNTER — OFFICE VISIT (OUTPATIENT)
Dept: CARDIOLOGY | Facility: OTHER | Age: 68
End: 2018-07-06
Attending: INTERNAL MEDICINE
Payer: MEDICARE

## 2018-07-06 VITALS
DIASTOLIC BLOOD PRESSURE: 67 MMHG | HEIGHT: 74 IN | SYSTOLIC BLOOD PRESSURE: 115 MMHG | RESPIRATION RATE: 18 BRPM | TEMPERATURE: 96.6 F | OXYGEN SATURATION: 98 % | BODY MASS INDEX: 33.37 KG/M2 | WEIGHT: 260 LBS | HEART RATE: 89 BPM

## 2018-07-06 DIAGNOSIS — I71.21 ASCENDING AORTIC ANEURYSM (H): ICD-10-CM

## 2018-07-06 DIAGNOSIS — R06.02 SOB (SHORTNESS OF BREATH): ICD-10-CM

## 2018-07-06 DIAGNOSIS — E66.9 OBESITY, UNSPECIFIED CLASSIFICATION, UNSPECIFIED OBESITY TYPE, UNSPECIFIED WHETHER SERIOUS COMORBIDITY PRESENT: ICD-10-CM

## 2018-07-06 DIAGNOSIS — I42.2 HYPERTROPHIC CARDIOMYOPATHY (H): Primary | ICD-10-CM

## 2018-07-06 DIAGNOSIS — I10 BENIGN ESSENTIAL HYPERTENSION: ICD-10-CM

## 2018-07-06 DIAGNOSIS — Z87.891 HISTORY OF TOBACCO ABUSE: ICD-10-CM

## 2018-07-06 DIAGNOSIS — E11.8 TYPE 2 DIABETES MELLITUS WITH COMPLICATION, WITHOUT LONG-TERM CURRENT USE OF INSULIN (H): ICD-10-CM

## 2018-07-06 DIAGNOSIS — F10.11 H/O ETOH ABUSE: ICD-10-CM

## 2018-07-06 DIAGNOSIS — I51.7 RIGHT VENTRICULAR ENLARGEMENT: ICD-10-CM

## 2018-07-06 DIAGNOSIS — N18.30 CHRONIC KIDNEY DISEASE, STAGE 3 (MODERATE): ICD-10-CM

## 2018-07-06 DIAGNOSIS — Z13.6 ENCOUNTER FOR ABDOMINAL AORTIC ANEURYSM SCREENING: ICD-10-CM

## 2018-07-06 DIAGNOSIS — Z71.6 TOBACCO ABUSE COUNSELING: ICD-10-CM

## 2018-07-06 LAB
ANION GAP SERPL CALCULATED.3IONS-SCNC: 10 MMOL/L (ref 3–14)
BUN SERPL-MCNC: 50 MG/DL (ref 7–30)
CALCIUM SERPL-MCNC: 10.4 MG/DL (ref 8.5–10.1)
CHLORIDE SERPL-SCNC: 104 MMOL/L (ref 94–109)
CO2 SERPL-SCNC: 23 MMOL/L (ref 20–32)
CREAT SERPL-MCNC: 2.11 MG/DL (ref 0.66–1.25)
EST. AVERAGE GLUCOSE BLD GHB EST-MCNC: 174 MG/DL
GFR SERPL CREATININE-BSD FRML MDRD: 31 ML/MIN/1.7M2
GLUCOSE SERPL-MCNC: 118 MG/DL (ref 70–99)
HBA1C MFR BLD: 7.7 % (ref 0–5.6)
INTERPRETATION MONITOR -MUSE: NORMAL
POTASSIUM SERPL-SCNC: 5.1 MMOL/L (ref 3.4–5.3)
SODIUM SERPL-SCNC: 137 MMOL/L (ref 133–144)

## 2018-07-06 PROCEDURE — G0463 HOSPITAL OUTPT CLINIC VISIT: HCPCS

## 2018-07-06 PROCEDURE — 83036 HEMOGLOBIN GLYCOSYLATED A1C: CPT | Mod: ZL | Performed by: INTERNAL MEDICINE

## 2018-07-06 PROCEDURE — 80048 BASIC METABOLIC PNL TOTAL CA: CPT | Mod: ZL | Performed by: INTERNAL MEDICINE

## 2018-07-06 PROCEDURE — G0463 HOSPITAL OUTPT CLINIC VISIT: HCPCS | Mod: 25

## 2018-07-06 PROCEDURE — 36415 COLL VENOUS BLD VENIPUNCTURE: CPT | Mod: ZL | Performed by: INTERNAL MEDICINE

## 2018-07-06 PROCEDURE — 40000788 ZZHCL STATISTIC ESTIMATED AVERAGE GLUCOSE: Mod: ZL | Performed by: INTERNAL MEDICINE

## 2018-07-06 PROCEDURE — 93010 ELECTROCARDIOGRAM REPORT: CPT | Performed by: INTERNAL MEDICINE

## 2018-07-06 PROCEDURE — 93005 ELECTROCARDIOGRAM TRACING: CPT

## 2018-07-06 PROCEDURE — 2894A VOIDCORRECT: CPT | Performed by: INTERNAL MEDICINE

## 2018-07-06 PROCEDURE — 99205 OFFICE O/P NEW HI 60 MIN: CPT | Performed by: INTERNAL MEDICINE

## 2018-07-06 ASSESSMENT — PAIN SCALES - GENERAL: PAINLEVEL: NO PAIN (0)

## 2018-07-06 NOTE — MR AVS SNAPSHOT
After Visit Summary   7/6/2018    Deon Culver    MRN: [id number]           Patient Information     Date Of Birth          1950        Visit Information        Provider Department      7/6/2018 2:00 PM Chas Armendariz, DO [facility] [place]        Today's Diagnoses     SOB (shortness of breath)    -  1      Care Instructions    You were seen by Dr. Armendariz, 7/6/2018.     1. The stress test and Holter monitor showed nothing concerning.       2. The Echocardiogram showed the septum, the divider between the heart, is thickened.  Although it isn't causing you problems right now, as it thickens, it can hinder blood flow in the heart.  Hypertrophic cardiomyopathy is the technical term for this problem.  This is genetic.  Children, brothers, and sisters should be checked for this abnormality by having an Echocardiogram done.     3.  Hypertrophic cardiomyopathy is generally treated with medications.  Metoprolol is often the medication used, and you are already taking this medication   It is also important to stay hydrated by drinking plenty of water (at least 64 ounces per day).  If the septum continues to thicken and the medications aren't effective, surgery is the next option.         4. The Echocardiogram also showed the right ventricle wall is enlarged.  It is generally enlarged secondary to something else, which may be from the Hypertrophic cardiomyopathy.      5.  The Echocardiogram also showed the aorta is enlarged, which is the main artery off the heart.  This is considered an aneurysm.  It is rated as mild with a measurement of 4.4 cm.  We just watch this for any changes by doing a repeat Echocardiogram in 6 months.  An order has been placed, and you will be contacted to schedule this procedure to be done in 6 months.      6. You will have a blood test called an A1C, this test reflects a 3 month average of your blood sugar levels.  Please stop by the clinic lab today before you leave to  have this lab done.  You will be called with the results.     7. An order has been placed for an ultrasound of your abdomen to check for an abdominal aneurysm.        You will follow up with Dr. Armendariz in 6 months, after you have the Echocardiogram done.       Please call the cardiology office with problems, questions, or concerns at [phone].    If you experience chest pain, chest pressure, chest tightness, shortness of breath, fainting, lightheadedness, nausea, vomiting, or other concerning symptoms, please report to the Emergency Department or call 911. These symptoms may be emergent, and best treated in the Emergency Department.     Samantha Chauhan RN  Cardiology   [facility]  [phone]      Hypertrophic Cardiomyopathy    Hypertrophic cardiomyopathy is a condition that affects the way the heart muscle works. It causes the heart muscle to grow thicker and stiffer than normal in certain areas, especially in the walls of the left ventricle and septum. This makes it hard for the heart to pump blood properly. Hypertrophic cardiomyopathy is most often caused by a genetic disorder, although it often doesn't show up until later in life. It can also develop due to aging or changes from chronic high blood pressure.  Hypertrophic cardiomyopathy may raise your risk for heart failure. Over time, the heart muscle may become too stiff to let the ventricle fill completely. Then the heart can't pump enough blood to meet the body's need. This can cause symptoms such as breathlessness, tiredness, or exhaustion when trying to exercise. Let your healthcare provider know if you have these symptoms.  In some people, hypertrophic cardiomyopathy carries a risk for sudden cardiac death. If you have any passing out spells, tell your healthcare provider about them right away. If anyone in your family has  suddenly, especially at an unexpected age, tell your healthcare provider.  The condition can be managed, but  there is no cure. Talk to your healthcare provider about treatment.  What are the symptoms of hypertrophic cardiomyopathy?  You may have no symptoms with hypertrophic cardiomyopathy. If symptoms do occur, they most likely appear when you exert yourself. Symptoms may include:    Problems catching your breath    Unexplained tiredness    Lightheadedness, dizzy spells, or fainting    Rapid, pounding heartbeat    Chest tightness or pressure    Fluid retention resulting in swollen feet or ankles or unexplained weight gain  What happens in your heart?  As the walls of the heart muscle thicken, it becomes harder for the heart to hold as much blood as possible. Thick walls may also block blood flow out to the rest of the body and damage heart valves. A stiff heart muscle can t relax between pumps the way it should, so less blood moves with each pump. Also, the heart may sometimes beat irregularly (too fast and out of rhythm).  Your treatment plan  Treatment can help keep cardiomyopathy from getting worse, and can reduce your symptoms. Your healthcare provider will work with you to develop a treatment plan to help you feel better now and prevent problems in the future. It is very important to follow the treatment plan exactly as directed. If you have questions or problems, talk to your healthcare provider.  Date Last Reviewed: 1/1/2017 2000-2017 [employer]. [address] [place] [address], [place] [address]. All rights reserved. This information is not intended as a substitute for professional medical care. Always follow your healthcare professional's instructions.                Follow-ups after your visit        Your next 10 appointments already scheduled     Jul 09, 2018  1:30 PM CDT   (Arrive by 1:15 PM)   EMG with Ranjit Meyer MD   [facility] EMG ([facility])    [address] [place] [id number] [phone]           Do not use lotions or creams on the area  to be tested. If you are on blood thinners (Warfarin, Coumadin, Lovenox, etc), please contact your primary care physician to check if it is safe to stop them 3 days prior to testing. If you have anxiety, please check with your referring physician to obtain anti-anxiety medication for the procedure.            Jul 09, 2018  2:30 PM CDT   (Arrive by 2:15 PM)   Return Visit with Jean-Claude Hopkins MD   [facility] ([facility] [place])    [address] [place] [address]  3rd [facility] [address] [phone]              Who to contact     If you have questions or need follow up information about today's clinic visit or your schedule please contact [facility] directly at [phone].  Normal or non-critical lab and imaging results will be communicated to you by MyChart, letter or phone within 4 business days after the clinic has received the results. If you do not hear from us within 7 days, please contact the clinic through MyChart or phone. If you have a critical or abnormal lab result, we will notify you by phone as soon as possible.  Submit refill requests through [employer] or call your pharmacy and they will forward the refill request to us. Please allow 3 business days for your refill to be completed.          Additional Information About Your Visit        [employer] Information     [employer] gives you secure access to your electronic health record. If you see a primary care provider, you can also send messages to your care team and make appointments. If you have questions, please call your primary care clinic.  If you do not have a primary care provider, please call [phone] and they will assist you.        Care EveryWhere ID     This is your Care EveryWhere ID. This could be used by other organizations to access your [place] medical records  [id number]        Your Vitals Were     Pulse Temperature Respirations Height Pulse Oximetry BMI (Body Mass Index)    89 96.6  F  "(35.9  C) (Tympanic) 18 1.88 m (6' 2\") 98% 33.38 kg/m2       Blood Pressure from Last 3 Encounters:   07/06/18 115/67   07/02/18 (!) 84/64   06/20/18 128/85    Weight from Last 3 Encounters:   07/06/18 117.9 kg (260 lb)   07/02/18 120.7 kg (266 lb)   06/20/18 121.4 kg (267 lb 9.6 oz)              We Performed the Following     EKG 12-lead complete w/read - (Clinic Performed)        Primary Care Provider Office Phone # Fax #    Sreedhar DIANNE Mendieta, [phone] [address] [address]        Equal Access to Services     ELVIA NAVA : Dejan rojaso Sodirk, waaxda luqadaha, qaybta kaalmada adeegyada, fidel mckeon . [facility] [phone].    ATENCIÓN: Si habla español, tiene a rodriguez disposición servicios gratuitos de asistencia lingüística. Llame al [phone].    We comply with applicable federal civil rights laws and [place] laws. We do not discriminate on the basis of race, color, national origin, age, disability, sex, sexual orientation, or gender identity.            Thank you!     Thank you for choosing [facility]  for your care. Our goal is always to provide you with excellent care. Hearing back from our patients is one way we can continue to improve our services. Please take a few minutes to complete the written survey that you may receive in the mail after your visit with us. Thank you!             Your Updated Medication List - Protect others around you: Learn how to safely use, store and throw away your medicines at www.disposemymeds.org.          This list is accurate as of 7/6/18  3:08 PM.  Always use your most recent med list.                   Brand Name Dispense Instructions for use Diagnosis    ASPIRIN PO      Take 81 mg by mouth daily        atorvastatin 40 MG tablet    LIPITOR    30 tablet    Take 1 tablet (40 mg) by mouth daily    Hyperlipidemia LDL goal <100       CINNAMON PO      Take 1,000 mg by mouth     "    clopidogrel 75 MG tablet    PLAVIX    30 tablet    Take 1 tablet (75 mg) by mouth daily    History of CVA (cerebrovascular accident)       Garlic 1000 MG Caps           lisinopril 40 MG tablet    PRINIVIL/ZESTRIL    30 tablet    Take 1 tablet (40 mg) by mouth daily    Benign essential hypertension       metFORMIN 500 MG 24 hr tablet    GLUCOPHAGE-XR    360 tablet    Take 2 tablets (1,000 mg) by mouth 2 times daily (with meals)    Type 2 diabetes mellitus with complication, without long-term current use of insulin (H)       metoprolol tartrate 50 MG tablet    LOPRESSOR    60 tablet    TAKE 1 TABLET BY MOUTH TWICE DAILY    Benign essential hypertension       tamsulosin 0.4 MG capsule    FLOMAX    90 capsule    Take 1 capsule (0.4 mg) by mouth daily    Benign prostatic hyperplasia, unspecified whether lower urinary tract symptoms present       TURMERIC CURCUMIN PO      Take 1,500 mg by mouth        Vitamin D-3 1000 units Caps      Take 1,000 Units by mouth

## 2018-07-06 NOTE — PROGRESS NOTES
[facility] HEART CARE   CARDIOLOGY CONSULT     Deon Culver   1950  [id number]    Sreedhar Mendieta     Chief Complaint   Patient presents with     Consult     SOB< Fatigue, no appitite, and leg weakness          HPI:   Mr. Culver is a 68-year-old gentleman who is being seen by cardiology secondary to findings of HCM as well as an ascending aortic aneurysm on his echo from 6/20/18.  He also has a history of severely elevated blood pressure, obesity, newly diagnosed diabetes mellitus type 2, tobacco abuse was cigar usage, alcohol abuse, chronic kidney disease stage III, obesity, and a brainstem stroke.    He was seen in the ER on 4/25/18.  He had been seen for slurred speech and high blood pressure.  He had not sought care for 20+ years.  His last visit was in 1999 for ear pain.  He had an episode of slurred speech approximately 2 weeks ago for 2-3 days. He was found to have severely elevated blood pressures described as being in the 220's per family.   His blood pressure in the ER was recorded as high as being 179/119.   He had a CT scan of his head on 4/25/18 that showed no acute intracranial process identified.  He was discharged on lisinopril/hydrochlorothiazide 20/25 mg 1 tablet daily.    Mendoza had been seen in the clinic on 4/30/18.  Previously, he had sought care through [facility] which I believe is a free service.  He was found to be severely hypertensive and was sent to the ED in [place].  He was found to be diabetic with a hemoglobin A1c of 10.5%.  He had concerns for vision changes, changes in sleeping habits, lethargy, weakness, and even slurred speech at time.  There was also concern for some chest pain.  He had been smoking cigars, owned a bar, and was drinking alcohol/wine a regular basis.  An MRI MRA of brain was ordered.  Atenolol was discontinued and he was started on Lopressor twice daily.    He was seen in the clinic again on 5/4/14 where he was started on aspirin, Flomax,  hydrocortisone cream, and a basic metabolic panel obtained.    He had an ultrasound of his carotids on 6/18/18.  There was no stenosis identified in either  vessel.     MR/MRA was felt to be negative by radiology of his brain but positive by neurology.  He was felt to have an old non acute brainstem stroke involving the mid brain into the marco.  He had small punctate areas of T2 signal change that appeared to be old that probably did represent small vessel ischemic change.  There were no other strokes identified.      As part of his workup, he had an echocardiogram and Holter monitor.  His Holter monitor was completed on 6/27/18 which showed ventricular ectopy and supraventricular ectopy.  It did not show any concern for a PFO, significant bradycardia, or atrial fibrillation.    His echocardiogram from 6/20/18 showed an ejection fraction of 60-65%, pattern of wall thickening which was consistent with hypertrophic cardiomyopathy with the septum at 2.0 cm in the posterior wall at 1.5 cm.  There was no evidence for dynamic outflow track obstruction.  There was severe right ventricular hypertrophy.  His ascending aorta was measured at 4.4 cm.  He was noted to have a false tendon in the left ventricle.    He has not any syncope, near syncope, dizziness, or lightheadedness.  He really has not seen any swelling to his lower extremities.  He has not had any chest pain, chest tightness, or chest discomfort.  We did go over his echo results as well as his Holter results.          IMAGING RESULTS:   Echo on 6/20/18.  Interpretation Summary  No cardiac source for embolus identified. Global and regional left ventricular  function is normal with an EF of 60-65%.  The pattern of wall thickening is consistent with hypertrophic cardiomyopathy.  Septum 2.0 cm, posterior wall 1.5 cm. Dynamic outflow tract obstruction is not  present.  Global right ventricular function is normal. There is severe right ventricular  hypertrophy.  The  atrial septum is intact as assessed by color Doppler and agitated saline  bubble study .  Mildly dilated Ascending aorta 4.4 cm, index 1.8 cm/m2.        ECHO from 6/18/18.  PROCEDURE: US CAROTID BILATERAL 6/18/2018 4:32 PM     HISTORY: ; History of CVA (cerebrovascular accident)     COMPARISONS: None.     TECHNIQUE: Ultrasound of the carotids     FINDINGS: On the right: In the common carotid artery peak systolic  velocity measured 72 cm/s and diastolic velocity measured 12 cm/s. In  the internal carotid artery peak systolic velocity measured 72 cm/s  and diastolic velocity measured 22 cm/s forward flow was demonstrated  in the right vertebral. On the left: In the common carotid artery peak  stalk velocity measured 80 cm/s and diastolic velocity measured 12  cm/s in the internal carotid artery peak systolic velocity measured 56  cm/s and diastolic velocity measured 10 cm/s forward flow was  demonstrated in the right vertebral.          IMPRESSION: No stenoses are identified in either carotid system      PROCEDURE: MRA ANGIOGRAM HEAD W/O CONTRAST 6/12/2018 6:11 PM     HISTORY: slurred speech; Slurred speech     COMPARISONS: None.     Meds/Dose Given:     TECHNIQUE: CT angiogram of the base of brain was performed using 3-D  time-of-flight method.     FINDINGS: The distal vertebral basilar superior cerebellar and  posterior cerebral branches appear normal. The carotid siphons appear  normal supraclinoid internal carotid arteries appear normal. The  anterior middle cerebral branches appear normal.          IMPRESSION: Negative MR angiogram at the base the brain    ALLERGIES:   No Known Allergies     PAST MEDICAL HISTORY:   Past Medical History:   Diagnosis Date     Altered gait 4/27/2018     Altered mental status - since approx 1/18 - delayed response, change in mentation 4/27/2018     Benign essential hypertension 4/27/2018     Chronic bilateral low back pain with sciatica 4/27/2018     Chronic fatigue 4/27/2018      Obesity 4/27/2018     Slurred speech x 3 months, CT neg ED [facility] 4/25/18 4/27/2018     Type 2 diabetes mellitus with complication, without long-term current use of insulin (H) 4/27/2018     Vision changes x 3 months as of 4/18 4/27/2018        PAST SURGICAL HISTORY:   No past surgical history on file.     FAMILY HISTORY:   Family History   Problem Relation Age of Onset     Colon Cancer Mother      Kidney Cancer Father      KIDNEY DISEASE Father      Coronary Artery Disease Brother         SOCIAL HISTORY:   Social History     Social History     Marital status: Unknown     Spouse name: N/A     Number of children: N/A     Years of education: N/A     Social History Main Topics     Smoking status: Former Smoker     Packs/day: 0.50     Types: Cigars     Start date: 1/1/2003     Quit date: 6/14/2018     Smokeless tobacco: Never Used     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      Drug use: No     Sexual activity: No     Other Topics Concern     None     Social History Narrative         CURRENT MEDICATIONS:   Prior to Admission medications    Medication Sig Start Date End Date Taking? Authorizing Provider   ASPIRIN PO Take 81 mg by mouth daily   Yes Reported, Patient   atorvastatin (LIPITOR) 40 MG tablet Take 1 tablet (40 mg) by mouth daily 6/14/18  Yes Sreedhar Mendieta DO   Cholecalciferol (VITAMIN D-3) 1000 units CAPS Take 1,000 Units by mouth   Yes Reported, Patient   CINNAMON PO Take 1,000 mg by mouth   Yes Reported, Patient   clopidogrel (PLAVIX) 75 MG tablet Take 1 tablet (75 mg) by mouth daily 6/15/18  Yes Sreedhar Mendieta DO   Garlic 1000 MG CAPS    Yes Reported, Patient   lisinopril (PRINIVIL/ZESTRIL) 40 MG tablet Take 1 tablet (40 mg) by mouth daily 5/2/18  Yes Sreedhar Mendieta DO   metFORMIN (GLUCOPHAGE-XR) 500 MG 24 hr tablet Take 2 tablets (1,000 mg) by mouth 2 times daily (with meals) 6/11/18  Yes Yvette Anand NP   metoprolol tartrate (LOPRESSOR) 50 MG tablet TAKE 1  TABLET BY MOUTH TWICE DAILY 6/25/18  Yes Sreedhar Mendieta DO   tamsulosin (FLOMAX) 0.4 MG capsule Take 1 capsule (0.4 mg) by mouth daily 5/14/18  Yes Sreedhar Mendieta DO   TURMERIC CURCUMIN PO Take 1,500 mg by mouth   Yes Reported, Patient          ROS:   CONSTITUTIONAL: No weight loss, fever, chills, but admits to weakness and fatigue.   HEENT: Eyes: No visual changes. Ears, Nose, Throat: No hearing loss, congestion or difficulty swallowing.   CARDIOVASCULAR: No chest pain, chest pressure or chest discomfort. No palpitations or lower extremity edema.   RESPIRATORY: Mild shortness of breath with dyspnea upon exertion, no cough or sputum production.   GASTROINTESTINAL: No abdominal pain. No anorexia, nausea, vomiting or diarrhea.   NEUROLOGICAL: No headache, lightheadedness, dizziness, syncope, ataxia but admits to weakness.   HEMATOLOGIC: No anemia, bleeding or bruising.   PSYCHIATRIC: No history of depression or anxiety.   ENDOCRINOLOGIC: No reports of sweating, cold or heat intolerance. No polyuria or polydipsia.   SKIN: No abnormal rashes or itching.       PHYSICAL EXAM:   GENERAL: The patient is a well-developed, well-nourished, in no apparent distress. Alert and oriented x3.   HEENT: Head is normocephalic and atraumatic. Eyes are symmetrical with normal visual tracking.  HEART: Regular rate and rhythm, S1S2 present without murmur, rub or gallop.   LUNGS: Respirations regular and unlabored. Clear to auscultation.  Mild reduction in air movement.  GI: Abdomen is soft and nondistended.   EXTREMITIES: Scant peripheral edema present.   MUSCULOSKELETAL: No joint swelling.   NEUROLOGIC: Alert and oriented X3.    SKIN: No jaundice. No rashes or visible skin lesions present.       EKG:    His EKG today shows sinus rhythm with first-degree block  Heart rate of 82 bpm.  Left axis deviation.  Incomplete left bundle branch block.    LAB RESULTS:   Office Visit on 07/02/2018   Component Date Value Ref Range  Status     Sodium 07/02/2018 136  133 - 144 mmol/L Final     Potassium 07/02/2018 4.6  3.4 - 5.3 mmol/L Final     Chloride 07/02/2018 103  94 - 109 mmol/L Final     Carbon Dioxide 07/02/2018 21  20 - 32 mmol/L Final     Anion Gap 07/02/2018 12  3 - 14 mmol/L Final     Glucose 07/02/2018 112* 70 - 99 mg/dL Final     Urea Nitrogen 07/02/2018 52* 7 - 30 mg/dL Final     Creatinine 07/02/2018 1.96* 0.66 - 1.25 mg/dL Final     GFR Estimate 07/02/2018 34* >60 mL/min/1.7m2 Final     GFR Estimate If Black 07/02/2018 41* >60 mL/min/1.7m2 Final     Calcium 07/02/2018 10.4* 8.5 - 10.1 mg/dL Final     Bilirubin Total 07/02/2018 0.6  0.2 - 1.3 mg/dL Final     Albumin 07/02/2018 4.3  3.4 - 5.0 g/dL Final     Protein Total 07/02/2018 8.0  6.8 - 8.8 g/dL Final     Alkaline Phosphatase 07/02/2018 75  40 - 150 U/L Final     ALT 07/02/2018 22  0 - 70 U/L Final     AST 07/02/2018 9  0 - 45 U/L Final     WBC 07/02/2018 7.9  4.0 - 11.0 10e9/L Final     RBC Count 07/02/2018 4.29* 4.4 - 5.9 10e12/L Final     Hemoglobin 07/02/2018 13.9  13.3 - 17.7 g/dL Final     Hematocrit 07/02/2018 38.1* 40.0 - 53.0 % Final     MCV 07/02/2018 89  78 - 100 fl Final     MCH 07/02/2018 32.4  26.5 - 33.0 pg Final     MCHC 07/02/2018 36.5  31.5 - 36.5 g/dL Final     RDW 07/02/2018 13.0  10.0 - 15.0 % Final     Platelet Count 07/02/2018 193  150 - 450 10e9/L Final     Diff Method 07/02/2018 Automated Method   Final     % Neutrophils 07/02/2018 63.1  % Final     % Lymphocytes 07/02/2018 25.0  % Final     % Monocytes 07/02/2018 8.0  % Final     % Eosinophils 07/02/2018 3.4  % Final     % Basophils 07/02/2018 0.5  % Final     Absolute Neutrophil 07/02/2018 5.0  1.6 - 8.3 10e9/L Final     Absolute Lymphocytes 07/02/2018 2.0  0.8 - 5.3 10e9/L Final     Absolute Monocytes 07/02/2018 0.6  0.0 - 1.3 10e9/L Final     Absolute Eosinophils 07/02/2018 0.3  0.0 - 0.7 10e9/L Final     Absolute Basophils 07/02/2018 0.0  0.0 - 0.2 10e9/L Final   [facility] Appointment on  06/20/2018   Component Date Value Ref Range Status     Creatinine 06/20/2018 2.5* 0.66 - 1.25 mg/dL Final     GFR Estimate 06/20/2018 26* >60 mL/min/1.7m2 Final     GFR Estimate If Black 06/20/2018 31* >60 mL/min/1.7m2 Final   Orders Only on 06/20/2018   Component Date Value Ref Range Status     WBC 06/20/2018 7.4  4.0 - 11.0 10e9/L Final     RBC Count 06/20/2018 5.05  4.4 - 5.9 10e12/L Final     Hemoglobin 06/20/2018 16.0  13.3 - 17.7 g/dL Final     Hematocrit 06/20/2018 45.5  40.0 - 53.0 % Final     MCV 06/20/2018 90  78 - 100 fl Final     MCH 06/20/2018 31.7  26.5 - 33.0 pg Final     MCHC 06/20/2018 35.2  31.5 - 36.5 g/dL Final     RDW 06/20/2018 12.6  10.0 - 15.0 % Final     Platelet Count 06/20/2018 213  150 - 450 10e9/L Final     Diff Method 06/20/2018 Automated Method   Final     % Neutrophils 06/20/2018 59.8  % Final     % Lymphocytes 06/20/2018 29.4  % Final     % Monocytes 06/20/2018 5.4  % Final     % Eosinophils 06/20/2018 3.8  % Final     % Basophils 06/20/2018 1.1  % Final     % Immature Granulocytes 06/20/2018 0.5  % Final     Nucleated RBCs 06/20/2018 0  0 /100 Final     Absolute Neutrophil 06/20/2018 4.5  1.6 - 8.3 10e9/L Final     Absolute Lymphocytes 06/20/2018 2.2  0.8 - 5.3 10e9/L Final     Absolute Monocytes 06/20/2018 0.4  0.0 - 1.3 10e9/L Final     Absolute Eosinophils 06/20/2018 0.3  0.0 - 0.7 10e9/L Final     Absolute Basophils 06/20/2018 0.1  0.0 - 0.2 10e9/L Final     Abs Immature Granulocytes 06/20/2018 0.0  0 - 0.4 10e9/L Final     Absolute Nucleated RBC 06/20/2018 0.0   Final     Bilirubin Direct 06/20/2018 0.1  0.0 - 0.2 mg/dL Final     Bilirubin Total 06/20/2018 0.4  0.2 - 1.3 mg/dL Final     Albumin 06/20/2018 4.8  3.4 - 5.0 g/dL Final     Protein Total 06/20/2018 8.7  6.8 - 8.8 g/dL Final     Alkaline Phosphatase 06/20/2018 70  40 - 150 U/L Final     ALT 06/20/2018 30  0 - 70 U/L Final     AST 06/20/2018 17  0 - 45 U/L Final     Sed Rate 06/20/2018 10  0 - 20 mm/h Final     CRP  Inflammation 06/20/2018 <2.9  0.0 - 8.0 mg/L Final     CK Total 06/20/2018 59  30 - 300 U/L Final     Aldolase 06/20/2018 3.1  1.5 - 8.1 U/L Final     AcetChol Modul Yenifer 06/20/2018 0  <=45 % Final     AcetChol Binding Yenifer 06/20/2018 0.0  0.0 - 0.4 nmol/L Final     AcetChol Block Yenifer 06/20/2018 22  0 - 26 % Final     Vitamin B12 06/20/2018 733  193 - 986 pg/mL Final     Methylmalonic Acid 06/20/2018 0.17  0.00 - 0.40 umol/L Final   Orders Only on 05/22/2018   Component Date Value Ref Range Status     Sodium 05/22/2018 131* 133 - 144 mmol/L Final     Potassium 05/22/2018 4.1  3.4 - 5.3 mmol/L Final     Chloride 05/22/2018 98  94 - 109 mmol/L Final     Carbon Dioxide 05/22/2018 20  20 - 32 mmol/L Final     Anion Gap 05/22/2018 13  3 - 14 mmol/L Final     Glucose 05/22/2018 112* 70 - 99 mg/dL Final     Urea Nitrogen 05/22/2018 44* 7 - 30 mg/dL Final     Creatinine 05/22/2018 1.41* 0.66 - 1.25 mg/dL Final     GFR Estimate 05/22/2018 50* >60 mL/min/1.7m2 Final     GFR Estimate If Black 05/22/2018 61  >60 mL/min/1.7m2 Final     Calcium 05/22/2018 10.3* 8.5 - 10.1 mg/dL Final   Office Visit on 05/14/2018   Component Date Value Ref Range Status     Sodium 05/14/2018 136  133 - 144 mmol/L Final     Potassium 05/14/2018 4.6  3.4 - 5.3 mmol/L Final     Chloride 05/14/2018 104  94 - 109 mmol/L Final     Carbon Dioxide 05/14/2018 22  20 - 32 mmol/L Final     Anion Gap 05/14/2018 10  3 - 14 mmol/L Final     Glucose 05/14/2018 150* 70 - 99 mg/dL Final     Urea Nitrogen 05/14/2018 65* 7 - 30 mg/dL Final     Creatinine 05/14/2018 1.76* 0.66 - 1.25 mg/dL Final     GFR Estimate 05/14/2018 39* >60 mL/min/1.7m2 Final     GFR Estimate If Black 05/14/2018 47* >60 mL/min/1.7m2 Final     Calcium 05/14/2018 10.3* 8.5 - 10.1 mg/dL Final            ASSESSMENT:       ICD-10-CM    1. Hypertrophic cardiomyopathy at 2.0 cm I42.2 Echocardiogram Complete   2. Ascending aortic aneurysm at 4.4 cm on echo from 6/20/18 I71.2 Echocardiogram Complete   3.  SOB (shortness of breath) R06.02 EKG 12-lead complete w/read - (Clinic Performed)     Echocardiogram Complete     Basic metabolic panel   4. Benign essential hypertension I10 Echocardiogram Complete   5. Right ventricular enlargement I51.7    6. Chronic kidney disease, stage 3 (moderate) N18.3 Basic metabolic panel   7. Type 2 diabetes mellitus with complication, without long-term current use of insulin (H) E11.8 Hemoglobin A1c   8. Obesity, unspecified classification, unspecified obesity type, unspecified whether serious comorbidity present E66.9    9. Encounter for abdominal aortic aneurysm screening Z13.6 US Abdominal Aorta Imaging   10. History of tobacco abuse Z87.891    11. H/O ETOH abuse Z87.898    12. Tobacco abuse counseling Z71.6          PLAN:   1.  We reviewed his echo results that shows HCM without obstruction and an ascending aortic aneurysm at 4.4 cm which seems highly likely the result of uncontrolled hypertension.  We discussed maintaining hydration and the need for future evaluations of his thickened septum.  Also, first-degree relatives which would include brothers and sisters and children need to be assessed approximately 5 years in their adult years.  The other option would be genetic testing.  He was encouraged to maintain significant hydration at approximate 64 ounces a day and refrain from strenuous activities.  His daughter was told to be evaluated for this problem with an echocardiogram as she was present in the room.  2.  We also discussed an ascending aortic aneurysm.  We will plan to repeat an echocardiogram in approximately 6 months to assess for potential acceleration of this problem.  He sending aorta was measured at 4.4 cm and it was explained to him that if it were to become 5.5 cm or increase in size at 0.5 mm in a years time, he would also need to have this vessel surgically repaired.  3.  He was encouraged to refrain from using tobacco.  4.  He was encouraged to refrain from  smoking.  5.  We will plan to get hemoglobin A1c with a new diagnosis of diabetes as well as a basic metabolic panel with chronic kidney disease.  6.  We did review his Holter monitor which largely showed ectopy.  7.  We discussed treatment for HCM/HOCM which would include verapamil, diltiazem, and rarely disopyramide.  If these are unsuccessful, ablation versus myectomy are frequent treatments.  8.  He will have an ultrasound of his abdominal aorta being  as he is between 66 and 76 with history of smoking.  He will be called with results.  9.  He will be seen approximate 6 months follow-up after a repeat echocardiogram to assess his a sending aortic aneurysm as well as HCM.      Thank you for allowing me to participate in the care of your patient. Please do not hesitate to contact me if you have any questions.     Chas Armendariz, DO

## 2018-07-06 NOTE — PATIENT INSTRUCTIONS
You were seen by Dr. Armendariz, 7/6/2018.     1. The stress test and Holter monitor showed nothing concerning.       2. The Echocardiogram showed the septum, the divider between the heart, is thickened.  Although it isn't causing you problems right now, as it thickens, it can hinder blood flow in the heart.  Hypertrophic cardiomyopathy is the technical term for this problem.  This is genetic.  Children, brothers, and sisters should be checked for this abnormality by having an Echocardiogram done.     3.  Hypertrophic cardiomyopathy is generally treated with medications.  Metoprolol is often the medication used, and you are already taking this medication   It is also important to stay hydrated by drinking plenty of water (at least 64 ounces per day).  If the septum continues to thicken and the medications aren't effective, surgery is the next option.         4. The Echocardiogram also showed the right ventricle wall is enlarged.  It is generally enlarged secondary to something else, which may be from the Hypertrophic cardiomyopathy.      5.  The Echocardiogram also showed the aorta is enlarged, which is the main artery off the heart.  This is considered an aneurysm.  It is rated as mild with a measurement of 4.4 cm.  We just watch this for any changes by doing a repeat Echocardiogram in 6 months.  An order has been placed, and you will be contacted to schedule this procedure to be done in 6 months.      6. You will have a blood test called an A1C, this test reflects a 3 month average of your blood sugar levels.  Please stop by the clinic lab today before you leave to have this lab done.  You will be called with the results.     7. An order has been placed for an ultrasound of your abdomen to check for an abdominal aneurysm.        You will follow up with Dr. Armendariz in 6 months, after you have the Echocardiogram done.       Please call the cardiology office with problems, questions, or concerns at [phone].    If you  experience chest pain, chest pressure, chest tightness, shortness of breath, fainting, lightheadedness, nausea, vomiting, or other concerning symptoms, please report to the Emergency Department or call 911. These symptoms may be emergent, and best treated in the Emergency Department.     Samantha Chauhan RN  Cardiology   [facility]  [phone]      Hypertrophic Cardiomyopathy    Hypertrophic cardiomyopathy is a condition that affects the way the heart muscle works. It causes the heart muscle to grow thicker and stiffer than normal in certain areas, especially in the walls of the left ventricle and septum. This makes it hard for the heart to pump blood properly. Hypertrophic cardiomyopathy is most often caused by a genetic disorder, although it often doesn't show up until later in life. It can also develop due to aging or changes from chronic high blood pressure.  Hypertrophic cardiomyopathy may raise your risk for heart failure. Over time, the heart muscle may become too stiff to let the ventricle fill completely. Then the heart can't pump enough blood to meet the body's need. This can cause symptoms such as breathlessness, tiredness, or exhaustion when trying to exercise. Let your healthcare provider know if you have these symptoms.  In some people, hypertrophic cardiomyopathy carries a risk for sudden cardiac death. If you have any passing out spells, tell your healthcare provider about them right away. If anyone in your family has  suddenly, especially at an unexpected age, tell your healthcare provider.  The condition can be managed, but there is no cure. Talk to your healthcare provider about treatment.  What are the symptoms of hypertrophic cardiomyopathy?  You may have no symptoms with hypertrophic cardiomyopathy. If symptoms do occur, they most likely appear when you exert yourself. Symptoms may include:    Problems catching your breath    Unexplained tiredness    Lightheadedness, dizzy  spells, or fainting    Rapid, pounding heartbeat    Chest tightness or pressure    Fluid retention resulting in swollen feet or ankles or unexplained weight gain  What happens in your heart?  As the walls of the heart muscle thicken, it becomes harder for the heart to hold as much blood as possible. Thick walls may also block blood flow out to the rest of the body and damage heart valves. A stiff heart muscle can t relax between pumps the way it should, so less blood moves with each pump. Also, the heart may sometimes beat irregularly (too fast and out of rhythm).  Your treatment plan  Treatment can help keep cardiomyopathy from getting worse, and can reduce your symptoms. Your healthcare provider will work with you to develop a treatment plan to help you feel better now and prevent problems in the future. It is very important to follow the treatment plan exactly as directed. If you have questions or problems, talk to your healthcare provider.  Date Last Reviewed: 1/1/2017 2000-2017 [employer]. [address] [place] [address], [place] [address]. All rights reserved. This information is not intended as a substitute for professional medical care. Always follow your healthcare professional's instructions.

## 2018-07-09 ENCOUNTER — OFFICE VISIT (OUTPATIENT)
Dept: NEUROLOGY | Facility: CLINIC | Age: 68
End: 2018-07-09
Payer: MEDICAID

## 2018-07-09 VITALS
WEIGHT: 260 LBS | BODY MASS INDEX: 33.37 KG/M2 | SYSTOLIC BLOOD PRESSURE: 133 MMHG | DIASTOLIC BLOOD PRESSURE: 62 MMHG | HEIGHT: 74 IN | HEART RATE: 99 BPM

## 2018-07-09 DIAGNOSIS — Z86.73 HISTORY OF STROKE: ICD-10-CM

## 2018-07-09 DIAGNOSIS — M48.062 SPINAL STENOSIS OF LUMBAR REGION WITH NEUROGENIC CLAUDICATION: Primary | ICD-10-CM

## 2018-07-09 DIAGNOSIS — G56.21 LESION OF RIGHT ULNAR NERVE: ICD-10-CM

## 2018-07-09 DIAGNOSIS — M54.17 L-S RADICULOPATHY: Primary | ICD-10-CM

## 2018-07-09 NOTE — MR AVS SNAPSHOT
After Visit Summary   7/9/2018    Deon Culver    MRN: [id number]           Patient Information     Date Of Birth          1950        Visit Information        Provider Department      7/9/2018 1:30 PM Ranjit Meyer MD [facility] EMG        Today's Diagnoses     L-S radiculopathy    -  1    Lesion of right ulnar nerve           Follow-ups after your visit        Your next 10 appointments already scheduled     Jul 09, 2018  2:30 PM CDT   (Arrive by 2:15 PM)   Return Visit with Jean-Claude Hopkins MD   [facility] Neurology ([facility])    [address]  [address] [facility] [address] [phone]            Jul 12, 2018  4:30 PM CDT   (Arrive by 4:15 PM)   US ABDOMINAL AORTIC IMAGING with HIUS2   HI ULTRASOUND ([facility] )    [address] [facility] [id number] [phone]           Please bring a list of your medicines (including vitamins, minerals and over-the-counter drugs). Also, tell your doctor about any allergies you may have. Wear comfortable clothes and leave your valuables at home.  Adults: No eating or drinking for 8 hours before the exam. You may take medicine with a small sip of water.  Children: - Children 6+ years: No food or drink for 6 hours before exam. - Children 1-5 years: No food or drink for 4 hours before exam. - Infants, breast-fed: may have breast milk up to 2 hours before exam. - Infants, formula: may have bottle until 4 hours before exam.  Please call the Imaging Department at your exam site with any questions.            Jan 16, 2019  4:00 PM CST   (Arrive by 3:45 PM)   Return Visit with Chas Armendariz DO   [facility] ([facility] )    [address]  [facility] [address] [phone]              Who to contact     Please call your clinic at [phone] to:    Ask questions about your health    Make or cancel appointments    Discuss your medicines    Learn about your  test results    Speak to your doctor            Additional Information About Your Visit        [employer] Information     [employer] gives you secure access to your electronic health record. If you see a primary care provider, you can also send messages to your care team and make appointments. If you have questions, please call your primary care clinic.  If you do not have a primary care provider, please call [phone] and they will assist you.      [employer] is an electronic gateway that provides easy, online access to your medical records. With [employer], you can request a clinic appointment, read your test results, renew a prescription or communicate with your care team.     To access your existing account, please contact your [facility] or call [phone] for assistance.        Care EveryWhere ID     This is your Care EveryWhere ID. This could be used by other organizations to access your [place] medical records  [id number]         Blood Pressure from Last 3 Encounters:   07/06/18 115/67   07/02/18 (!) 84/64   06/20/18 128/85    Weight from Last 3 Encounters:   07/06/18 117.9 kg (260 lb)   07/02/18 120.7 kg (266 lb)   06/20/18 121.4 kg (267 lb 9.6 oz)              We Performed the Following     HC NCS MOTOR W OR W/O F-WAVE, 11 OR 12     HC NEEDLE EMG EA EXTREMITY W/PARASPINAL AREA LIMITED     HC NEEDLE EMG EA EXTREMTY W/PARASPINAL AREA COMPLETE        Primary Care Provider Office Phone # Fax #    Sreedhar Mendieta  [phone]       [address] [place] [address]        Equal Access to Services     ELVIA NAVA : Hadii aad ku hadasho Soomaali, waaxda luqadaha, qaybta kaalmada adeegyagemma, fidel marshall. So [facility] [phone].    ATENCIÓN: Si habla español, tiene a rodriguez disposición servicios gratuitos de asistencia lingüística. Llame al [phone].    We comply with applicable federal civil rights laws and [place] laws. We do  not discriminate on the basis of race, color, national origin, age, disability, sex, sexual orientation, or gender identity.            Thank you!     Thank you for choosing [facility]  for your care. Our goal is always to provide you with excellent care. Hearing back from our patients is one way we can continue to improve our services. Please take a few minutes to complete the written survey that you may receive in the mail after your visit with us. Thank you!             Your Updated Medication List - Protect others around you: Learn how to safely use, store and throw away your medicines at www.disposemymeds.org.          This list is accurate as of 7/9/18  2:25 PM.  Always use your most recent med list.                   Brand Name Dispense Instructions for use Diagnosis    ASPIRIN PO      Take 81 mg by mouth daily        atorvastatin 40 MG tablet    LIPITOR    30 tablet    Take 1 tablet (40 mg) by mouth daily    Hyperlipidemia LDL goal <100       CINNAMON PO      Take 1,000 mg by mouth        clopidogrel 75 MG tablet    PLAVIX    30 tablet    Take 1 tablet (75 mg) by mouth daily    History of CVA (cerebrovascular accident)       Garlic 1000 MG Caps           lisinopril 40 MG tablet    PRINIVIL/ZESTRIL    30 tablet    Take 1 tablet (40 mg) by mouth daily    Benign essential hypertension       metFORMIN 500 MG 24 hr tablet    GLUCOPHAGE-XR    360 tablet    Take 2 tablets (1,000 mg) by mouth 2 times daily (with meals)    Type 2 diabetes mellitus with complication, without long-term current use of insulin (H)       metoprolol tartrate 50 MG tablet    LOPRESSOR    60 tablet    TAKE 1 TABLET BY MOUTH TWICE DAILY    Benign essential hypertension       tamsulosin 0.4 MG capsule    FLOMAX    90 capsule    Take 1 capsule (0.4 mg) by mouth daily    Benign prostatic hyperplasia, unspecified whether lower urinary tract symptoms present       TURMERIC CURCUMIN PO      Take 1,500 mg by mouth        Vitamin D-3 1000 units  Caps      Take 1,000 Units by mouth

## 2018-07-09 NOTE — LETTER
2018       RE: Deon Culver  [address]  [facility] [address]     Dear Colleague,    Thank you for referring your patient, Deon Culver, to the [facility]G at [facility]. Please see a copy of my visit note below.        [facility]  Electrodiagnostic Laboratory    Nerve Conduction & EMG Report          Patient:       Deon Culver  Patient ID:    [id number]  Gender:        Male  YOB: 1950  Age:           68 Years 2 Months        History & Examination:  68 year old man with bilateral weakness and imbalance. Legs more affected than arms. Also reports back pain. Asked to eval for LS radiculopathy, neuropathy, and NMJ disorder.     Techniques: Motor and sensory conduction studies were done with surface recording electrodes. EMG was done with a concentric needle electrode.      Results:  Nerve conduction studies:  1. Bilateral sural, right median-D2, right ulnar-D5, and right radial sensory responses are normal.   2. Left peroneal-EDB motor response shows normal DL, moderately reduced amplitude and mild to moderately slowed CV.   3. Left tibial-AH motor response shows normal DL and reduced amplitude.   4. Right ulnar-ADM motor response shows mildly prolonged DL, normal amplitude, and mild to moderate CV slowing across the elbow.   5. Right tibial-AH motor response shows mildly prolonged DL and mildly reduced amplitude.   6. Right median-APB motor response shows mildly prolonged DL, normal amplitude and normal CV.   7. Right peroneal-EDB motor responses are normal.   8. Right ulnar-ADM and facial-nasalis RNS is normal.     Needle EM. Increased insertional activity without sustained fibs/psw were seen in the bilateral gastrocnemius muscles.   2. CRDs were seen in the right TA muscle.   3. Large amplitude and/or long duration motor unit potentials (MUP) were seen in most of the sampled muscles as tabulated below.   4. Recruitment patterns were  normal.     Interpretation:  This is an abnormal study. There is electrophysiologic evidence of chronic bilateral lumbosacral radiculopathies affecting the L4-S1 nerve roots, as can be seen in the context of lumbosacral spinal stenosis. In addition there is evidence of a mild right-sided ulnar neuropathy across the elbow. Clinical correlation is recommended. There is no evidence of a disorder of neuromuscular junction transmission on the basis of this study.     Ranjit Meyer MD  Department of Neurology         Sensory NCS      Nerve / Sites Rec. Site Onset Peak NP Amp Ref. PP Amp Dist Guillermo Ref. Temp     ms ms  V  V  V cm m/s m/s  C   R MEDIAN - Dig II Anti      Wrist Dig II 2.81 3.65 14.8 10.0 32.6 14 49.8 48.0 31.9   R ULNAR - Dig V Anti      Wrist Dig V 2.50 3.39 17.0 8.0 28.0 12.5 50.0 48.0 31.7   R RADIAL - Snuff      Forearm Snuff 1.88 2.34 23.1 15.0 25.9 10 53.3 48.0 33.1   R SURAL - Lat Mall 60      Calf Ankle 3.91 5.36 7.0 5.0 5.0 15 38.4 38.0 31   L SURAL - Lat Mall 60      Calf Ankle 3.70 5.05 5.9 5.0 5.7 14 37.9 38.0 31.8       Motor NCS      Nerve / Sites Rec. Site Lat Ref. Amp Ref. Rel Amp Dist Guillermo Ref. Dur. Area Temp.     ms ms mV mV % cm m/s m/s ms %  C   R MEDIAN - APB      Wrist APB 4.69 4.40 5.4 5.0 100 8   6.72 100 31.8      Elbow APB 9.69  5.1  93.9 25 50.0 48.0 7.55 92.8 31.8   R ULNAR - ADM      Wrist ADM 3.75 3.50 7.1 5.0 100 8   5.78 100 33.6      B.Elbow ADM 8.02  5.1  72.3 22 51.5 48.0 6.35 87.5 33.8      A.Elbow ADM 10.00  4.6  64.9 8 40.4 48.0 6.25 85.8 33.8   R DEEP PERONEAL - EDB 60      Ankle EDB 5.63 6.00 4.1 2.0 100 8   5.78 100 29.3      FibHead EDB 15.63  3.4  83.5 39 39.0 38.0 6.93 88 29.3      Pop Fos EDB 18.18  3.4  82.3 10 39.2 38.0 7.14 87.5 29.3   L DEEP PERONEAL - EDB 60      Ankle EDB 5.94 6.00 1.6 2.0 100 8   6.35 100 29.1      FibHead EDB 16.30  1.7  108 35 33.8 38.0 8.07 112 29.1      Pop Fos EDB 18.75  1.6  102 8 32.7 38.0 7.97 110 29   R TIBIAL - AH      Ankle AH 7.03  6.00 3.1 4.0 100 8   5.78 100 29.8      Pop Fos AH 18.59  1.9  60.4 44 38.1 38.0 7.86 134 29.4   L TIBIAL - AH      Ankle AH 5.26 6.00 1.1 4.0 100 8   5.00 100 29.5       F  Wave      Nerve Min F Lat Max F Lat Mean FLat Temp.    ms ms ms  C   R ULNAR 35.83 38.65 36.97 33.6             Rep Nerve Stim 6      Muscle / Train Time Rate Amp 4-1 Fac Ampl Area 4-1 Fac Area     pps mV % % mVms % %   R ABD DIG MIN (UL)   Baseline 0:00:00 3 5.7 -2.3 100 13.0 -8.3 100   Post Exercise : 1 0:01:10 3 5.8 -2.3 101 12.7 -7.3 97.7   2 0:02:11 3 5.9 -0 102 12.7 -4.4 97.6   R NASALIS   Baseline 0:00:00 3 1.6 -1.8 100 4.4 0.1 100       EMG Summary Table     Spontaneous MUAP Recruitment    IA Fib PSW Fasc H.F. Amp Dur. PPP Pattern   R. VAST LATERALIS N None None None None 1+ 2+ 1+ N   R. TIB ANTERIOR N None None None CRD N 2+ 1+ N   R. GASTROCN (MED) Increased None None None None N 1+ 1+ N   L. VAST LATERALIS N None None None None N 2+ 2+ N   L. TIB ANTERIOR N None None None None 1+ 1+ N N   L. GASTROCN (MED) Increased None None None None 1+ 2+ 1+ N   L. PERON LONGUS N None None None None N 1+ 1+ N   L. LUMB PSP (L) N None None None None                                              Again, thank you for allowing me to participate in the care of your patient.      Sincerely,    Ranjit Meyer MD

## 2018-07-09 NOTE — MR AVS SNAPSHOT
After Visit Summary   7/9/2018    Deon Culver    MRN: [id number]           Patient Information     Date Of Birth          1950        Visit Information        Provider Department      7/9/2018 2:30 PM Jean-Claude Hopkins MD [facility] Neurology        Today's Diagnoses     Spinal stenosis of lumbar region with neurogenic claudication    -  1    History of stroke           Follow-ups after your visit        Follow-up notes from your care team     Return in about 2 months (around 9/9/2018).      Your next 10 appointments already scheduled     Jul 13, 2018  3:30 PM CDT   (Arrive by 3:15 PM)   US ABDOMINAL AORTIC IMAGING with HIUS2   HI ULTRASOUND ([facility] )    [address] [place] [id number] [phone]           Please bring a list of your medicines (including vitamins, minerals and over-the-counter drugs). Also, tell your doctor about any allergies you may have. Wear comfortable clothes and leave your valuables at home.  Adults: No eating or drinking for 8 hours before the exam. You may take medicine with a small sip of water.  Children: - Children 6+ years: No food or drink for 6 hours before exam. - Children 1-5 years: No food or drink for 4 hours before exam. - Infants, breast-fed: may have breast milk up to 2 hours before exam. - Infants, formula: may have bottle until 4 hours before exam.  Please call the Imaging Department at your exam site with any questions.            Jul 13, 2018  4:15 PM CDT   (Arrive by 4:00 PM)   MR LUMBAR SPINE W/O CONTRAST with HIMR1   HI MRI ([facility] )    [address] [place] [id number] [phone]           Take your medicines as usual, unless your doctor tells you not to. Bring a list of your current medicines to your exam (including vitamins, minerals and over-the-counter drugs). Also bring the results of similar scans you may have had.  Please remove any body piercings and hair extensions before you  arrive.  Follow your doctor s orders. If you do not, we may have to postpone your exam.  You may or may not receive IV contrast for this exam pending the discretion of the Radiologist.  You do not need to do anything special to prepare.  The MRI machine uses a strong magnet. Please wear clothes without metal (snaps, zippers). A sweatsuit works well, or we may give you a hospital gown.   **IMPORTANT** THE INSTRUCTIONS BELOW ARE ONLY FOR THOSE PATIENTS WHO HAVE BEEN PRESCRIBED SEDATION OR GENERAL ANESTHESIA DURING THEIR MRI PROCEDURE:  IF YOUR DOCTOR PRESCRIBED ORAL SEDATION (take medicine to help you relax during your exam):   You must get the medicine from your doctor (oral medication) before you arrive. Bring the medicine to the exam. Do not take it at home. You ll be told when to take it upon arriving for your exam.   Arrive one hour early. Bring someone who can take you home after the test. Your medicine will make you sleepy. After the exam, you may not drive, take a bus or take a taxi by yourself.  IF YOUR DOCTOR PRESCRIBED IV SEDATION:   Arrive one hour early. Bring someone who can take you home after the test. Your medicine will make you sleepy. After the exam, you may not drive, take a bus or take a taxi by yourself.   No eating 6 hours before your exam. You may have clear liquids up until 4 hours before your exam. (Clear liquids include water, clear tea, black coffee and fruit juice without pulp.)  IF YOUR DOCTOR PRESCRIBED ANESTHESIA (be asleep for your exam):   Arrive 1 1/2 hours early. Bring someone who can take you home after the test. You may not drive, take a bus or take a taxi by yourself.   No eating 8 hours before your exam. You may have clear liquids up until 4 hours before your exam. (Clear liquids include water, clear tea, black coffee and fruit juice without pulp.)   You will spend four to five hours in the recovery room.  Please call the Imaging Department at your exam site with any questions.  "           Sep 12, 2018  2:30 PM CDT   (Arrive by 2:15 PM)   Return Visit with Jean-Claude Hopkins MD   [facility] Neurology ([facility])    [address] [facility]  [address] [facility] [address] [phone]            Jan 16, 2019  4:00 PM CST   (Arrive by 3:45 PM)   Return Visit with Chas Armendariz,    [facility] [place] ([facility] )    [address]  [place] [address]   [phone]              Future tests that were ordered for you today     Open Future Orders        Priority Expected Expires Ordered    MRI Lumbar spine w/o contrast Routine 7/9/2018 7/9/2019 7/9/2018            Who to contact     Please call your clinic at [phone] to:    Ask questions about your health    Make or cancel appointments    Discuss your medicines    Learn about your test results    Speak to your doctor            Additional Information About Your Visit        [employer] Information     [employer] gives you secure access to your electronic health record. If you see a primary care provider, you can also send messages to your care team and make appointments. If you have questions, please call your primary care clinic.  If you do not have a primary care provider, please call [phone] and they will assist you.      [employer] is an electronic gateway that provides easy, online access to your medical records. With [employer], you can request a clinic appointment, read your test results, renew a prescription or communicate with your care team.     To access your existing account, please contact your [facility] or call [phone] for assistance.        Care EveryWhere ID     This is your Care EveryWhere ID. This could be used by other organizations to access your [place] medical records  [id number]        Your Vitals Were     Pulse Height BMI (Body Mass Index)             99 1.88 m (6' 2\") 33.38 kg/m2          Blood Pressure from Last 3 " Encounters:   07/09/18 133/62   07/06/18 115/67   07/02/18 (!) 84/64    Weight from Last 3 Encounters:   07/09/18 117.9 kg (260 lb)   07/06/18 117.9 kg (260 lb)   07/02/18 120.7 kg (266 lb)               Primary Care Provider Office Phone # Fax #    Sreedhar Mckeonmacariocamila  [phone]       [address] [place] [address]        Equal Access to Services     ELVIA NAVA : Hadii aad ku hadasho Soomaali, waaxda luqadaha, qaybta kaalmada adeegyada, fidel taylor hayyessica mckeon . So [facility] [phone].    ATENCIÓN: Si habla español, tiene a rodriguez disposición servicios gratuitos de asistencia lingüística. [facility] [phone].    We comply with applicable federal civil rights laws and [place] laws. We do not discriminate on the basis of race, color, national origin, age, disability, sex, sexual orientation, or gender identity.            Thank you!     Thank you for choosing [facility] NEUROLOGY  for your care. Our goal is always to provide you with excellent care. Hearing back from our patients is one way we can continue to improve our services. Please take a few minutes to complete the written survey that you may receive in the mail after your visit with us. Thank you!             Your Updated Medication List - Protect others around you: Learn how to safely use, store and throw away your medicines at www.disposemymeds.org.          This list is accurate as of 7/9/18  3:58 PM.  Always use your most recent med list.                   Brand Name Dispense Instructions for use Diagnosis    ASPIRIN PO      Take 81 mg by mouth daily        atorvastatin 40 MG tablet    LIPITOR    30 tablet    Take 1 tablet (40 mg) by mouth daily    Hyperlipidemia LDL goal <100       CINNAMON PO      Take 1,000 mg by mouth        clopidogrel 75 MG tablet    PLAVIX    30 tablet    Take 1 tablet (75 mg) by mouth daily    History of CVA (cerebrovascular accident)       Garlic 1000 MG Caps           lisinopril 40 MG  tablet    PRINIVIL/ZESTRIL    30 tablet    Take 1 tablet (40 mg) by mouth daily    Benign essential hypertension       metFORMIN 500 MG 24 hr tablet    GLUCOPHAGE-XR    360 tablet    Take 2 tablets (1,000 mg) by mouth 2 times daily (with meals)    Type 2 diabetes mellitus with complication, without long-term current use of insulin (H)       metoprolol tartrate 50 MG tablet    LOPRESSOR    60 tablet    TAKE 1 TABLET BY MOUTH TWICE DAILY    Benign essential hypertension       tamsulosin 0.4 MG capsule    FLOMAX    90 capsule    Take 1 capsule (0.4 mg) by mouth daily    Benign prostatic hyperplasia, unspecified whether lower urinary tract symptoms present       TURMERIC CURCUMIN PO      Take 1,500 mg by mouth        Vitamin D-3 1000 units Caps      Take 1,000 Units by mouth

## 2018-07-09 NOTE — LETTER
2018       RE: Deon Culver  [address]  [facility] [address]     Dear Colleague,    Thank you for referring your patient, Deon Culver, to the [facility] NEUROLOGY at [facility]. Please see a copy of my visit note below.    Service Date: 2018        RE: Deon Culver   MRN: [id number]   : 1950      Dear Dr. Mendieta:      This is in regard to followup on Deon Culver.  The patient returned today with chief complaints of finding a brainstem stroke on MRI scan, low back and leg pain, weight loss, and decreased appetite.      The patient underwent further testing at [facility].  He did have EMG studies today by Dr. Ranjit Meyer, a noted neuromuscular expert.  Dr. Meyer went on to state this was an abnormal study and there was electrophysiologic evidence of chronic bilateral lumbosacral radiculopathies affecting the L4 through S1 nerve roots.  He said this could be seen in the context of lumbosacral spinal stenosis.  He also found there was evidence of a mild right-sided ulnar neuropathy.  He recommended clinical correlation for this.  Specifically, there was no evidence of a disorder of neuromuscular junction transmission on this study.  I reassured the patient and his family about this latter finding.  He has not had an MRI scan of the lumbar spine and I did suggest that this be done.  He declined having it done today at [facility] and will have it done near his home.  I have asked that he coordinate this with you.  The patient did tell me that he is addressing his vascular risk factors.  He just had an extensive evaluation by his cardiologist.  I reviewed this too with them.  The patient does now have a diagnosis of hypertrophic cardiomyopathy.  He is going to continue to follow up with his cardiologist and I made sure his family and he had copies of recommendations and the actual consultation.      I reviewed with the patient and his family his normal MRA study of  the neck arteries.  I reassured him about this.      The patient is scheduled now to have an ultrasound of his abdomen tomorrow.  This is to examine his aorta, but it would give information about his abdomen in general, I believe.  I did again review with him his history of weight loss and his decreased appetite and I have asked that he address this with you.  I did reassure him that a chest x-ray I ordered was normal.  He did tell me he has stopped smoking and the use of alcohol now.      I went over all of his different laboratory tests that were done which are collated in his records and made sure he had copies and specifically reassured him that he had a negative or normal acetylcholine receptor antibodies done.  I do not believe now that he has any evidence of neuromuscular transmission defect such as myasthenia gravis or Eaton-Lambert syndrome.      The patient and his family did review with me their history of his slurred speech and balance issues.  They said they could not tell if this had happened anywhere from February to the end of April.  With this information, I did talk about the findings of a stroke on his MRI scan.  The patient would be a candidate for antiplatelet therapy with both Plavix and aspirin for 3 months then dating from the end of April.  Beyond 3 months, he would not probably get further benefit from antiplatelet therapy except for the use of aspirin alone.  I did ask that he review this with you and his cardiologist but did suggest that he probably stop Plavix at the end of July.  I have warned him about risk of combination therapy with specifically bleeding issues.      The patient is going to contact me after he gets his MRI scan done of the lumbar spine and go over the possibility of further treatment of this suspected lumbar issue now.  I reviewed with the patient lifting restrictions because of suspected back problems.      The patient's blood pressure today was 132/62 with a pulse  of 99.      I have asked that the patient have followup with me routinely in September and on a p.r.n. basis.  Again, I made sure he had copies of all of my records here at [facility] that were available through the Epic website.      The patient understands that if he has any new stroke-like symptoms, he is to have immediate evaluation in the emergency room.     I spent 30 minutes with the patient and his family today.  Over 50% of the time this involved counseling and coordination of care.            D: 07/10/2018   T: 07/10/2018   MT: AKA      Name:     BETO EBRRY   MRN:      [id number]        Account:      [id number]   :      1950           Service Date: 2018      Document: [id number]       Again, thank you for allowing me to participate in the care of your patient.      Sincerely,    Jean-Claude Hopkins MD    CC:     Sreedhar Mendieta, [address] [place] [address]

## 2018-07-09 NOTE — NURSING NOTE
Chief Complaint   Patient presents with     RECHECK     Abnormal MRI     Nataly Smyth  Preventive Care:    Diabetic Eye Exam Screening: During our visit today, we discussed that it is recommended you receive diabetic eye exam screening. Please call or make an appointment with your primary care provider to discuss this with them. You may also call the [facility] scheduling line ([phone]) to set up an eye exam at one of the [facility].    Yearly Foot exam is due

## 2018-07-09 NOTE — PROGRESS NOTES
[facility]  Electrodiagnostic Laboratory    Nerve Conduction & EMG Report          Patient:       Deon Culver  Patient ID:    [id number]  Gender:        Male  YOB: 1950  Age:           68 Years 2 Months        History & Examination:  68 year old man with bilateral weakness and imbalance. Legs more affected than arms. Also reports back pain. Asked to eval for LS radiculopathy, neuropathy, and NMJ disorder.     Techniques: Motor and sensory conduction studies were done with surface recording electrodes. EMG was done with a concentric needle electrode.      Results:  Nerve conduction studies:  1. Bilateral sural, right median-D2, right ulnar-D5, and right radial sensory responses are normal.   2. Left peroneal-EDB motor response shows normal DL, moderately reduced amplitude and mild to moderately slowed CV.   3. Left tibial-AH motor response shows normal DL and reduced amplitude.   4. Right ulnar-ADM motor response shows mildly prolonged DL, normal amplitude, and mild to moderate CV slowing across the elbow.   5. Right tibial-AH motor response shows mildly prolonged DL and mildly reduced amplitude.   6. Right median-APB motor response shows mildly prolonged DL, normal amplitude and normal CV.   7. Right peroneal-EDB motor responses are normal.   8. Right ulnar-ADM and facial-nasalis RNS is normal.     Needle EM. Increased insertional activity without sustained fibs/psw were seen in the bilateral gastrocnemius muscles.   2. CRDs were seen in the right TA muscle.   3. Large amplitude and/or long duration motor unit potentials (MUP) were seen in most of the sampled muscles as tabulated below.   4. Recruitment patterns were normal.     Interpretation:  This is an abnormal study. There is electrophysiologic evidence of chronic bilateral lumbosacral radiculopathies affecting the L4-S1 nerve roots, as can be seen in the context of lumbosacral spinal stenosis. In addition there is  evidence of a mild right-sided ulnar neuropathy across the elbow. Clinical correlation is recommended. There is no evidence of a disorder of neuromuscular junction transmission on the basis of this study.     Ranjit Meyer MD  Department of Neurology         Sensory NCS      Nerve / Sites Rec. Site Onset Peak NP Amp Ref. PP Amp Dist Guillermo Ref. Temp     ms ms  V  V  V cm m/s m/s  C   R MEDIAN - Dig II Anti      Wrist Dig II 2.81 3.65 14.8 10.0 32.6 14 49.8 48.0 31.9   R ULNAR - Dig V Anti      Wrist Dig V 2.50 3.39 17.0 8.0 28.0 12.5 50.0 48.0 31.7   R RADIAL - Snuff      Forearm Snuff 1.88 2.34 23.1 15.0 25.9 10 53.3 48.0 33.1   R SURAL - Lat Mall 60      Calf Ankle 3.91 5.36 7.0 5.0 5.0 15 38.4 38.0 31   L SURAL - Lat Mall 60      Calf Ankle 3.70 5.05 5.9 5.0 5.7 14 37.9 38.0 31.8       Motor NCS      Nerve / Sites Rec. Site Lat Ref. Amp Ref. Rel Amp Dist Guillermo Ref. Dur. Area Temp.     ms ms mV mV % cm m/s m/s ms %  C   R MEDIAN - APB      Wrist APB 4.69 4.40 5.4 5.0 100 8   6.72 100 31.8      Elbow APB 9.69  5.1  93.9 25 50.0 48.0 7.55 92.8 31.8   R ULNAR - ADM      Wrist ADM 3.75 3.50 7.1 5.0 100 8   5.78 100 33.6      B.Elbow ADM 8.02  5.1  72.3 22 51.5 48.0 6.35 87.5 33.8      A.Elbow ADM 10.00  4.6  64.9 8 40.4 48.0 6.25 85.8 33.8   R DEEP PERONEAL - EDB 60      Ankle EDB 5.63 6.00 4.1 2.0 100 8   5.78 100 29.3      FibHead EDB 15.63  3.4  83.5 39 39.0 38.0 6.93 88 29.3      Pop Fos EDB 18.18  3.4  82.3 10 39.2 38.0 7.14 87.5 29.3   L DEEP PERONEAL - EDB 60      Ankle EDB 5.94 6.00 1.6 2.0 100 8   6.35 100 29.1      FibHead EDB 16.30  1.7  108 35 33.8 38.0 8.07 112 29.1      Pop Fos EDB 18.75  1.6  102 8 32.7 38.0 7.97 110 29   R TIBIAL - AH      Ankle AH 7.03 6.00 3.1 4.0 100 8   5.78 100 29.8      Pop Fos AH 18.59  1.9  60.4 44 38.1 38.0 7.86 134 29.4   L TIBIAL - AH      Ankle AH 5.26 6.00 1.1 4.0 100 8   5.00 100 29.5       F  Wave      Nerve Min F Lat Max F Lat Mean FLat Temp.    ms ms ms  C   R ULNAR 35.83  38.65 36.97 33.6             Rep Nerve Stim 6      Muscle / Train Time Rate Amp 4-1 Fac Ampl Area 4-1 Fac Area     pps mV % % mVms % %   R ABD DIG MIN (UL)   Baseline 0:00:00 3 5.7 -2.3 100 13.0 -8.3 100   Post Exercise : 1 0:01:10 3 5.8 -2.3 101 12.7 -7.3 97.7   2 0:02:11 3 5.9 -0 102 12.7 -4.4 97.6   R NASALIS   Baseline 0:00:00 3 1.6 -1.8 100 4.4 0.1 100       EMG Summary Table     Spontaneous MUAP Recruitment    IA Fib PSW Fasc H.F. Amp Dur. PPP Pattern   R. VAST LATERALIS N None None None None 1+ 2+ 1+ N   R. TIB ANTERIOR N None None None CRD N 2+ 1+ N   R. GASTROCN (MED) Increased None None None None N 1+ 1+ N   L. VAST LATERALIS N None None None None N 2+ 2+ N   L. TIB ANTERIOR N None None None None 1+ 1+ N N   L. GASTROCN (MED) Increased None None None None 1+ 2+ 1+ N   L. PERON LONGUS N None None None None N 1+ 1+ N   L. LUMB PSP (L) N None None None None

## 2018-07-10 NOTE — PROGRESS NOTES
Service Date: 2018      Sreedhar Mendieta,    [facility]    [address] [place] [address]      RE: Deon Culver   MRN: [id number]   : 1950      Dear Dr. Mendieta:      This is in regard to followup on Deon Culver.  The patient returned today with chief complaints of finding a brainstem stroke on MRI scan, low back and leg pain, weight loss, and decreased appetite.      The patient underwent further testing at [facility].  He did have EMG studies today by Dr. Ranjit Meyer, a noted neuromuscular expert.  Dr. Meyer went on to state this was an abnormal study and there was electrophysiologic evidence of chronic bilateral lumbosacral radiculopathies affecting the L4 through S1 nerve roots.  He said this could be seen in the context of lumbosacral spinal stenosis.  He also found there was evidence of a mild right-sided ulnar neuropathy.  He recommended clinical correlation for this.  Specifically, there was no evidence of a disorder of neuromuscular junction transmission on this study.  I reassured the patient and his family about this latter finding.  He has not had an MRI scan of the lumbar spine and I did suggest that this be done.  He declined having it done today at [facility] and will have it done near his home.  I have asked that he coordinate this with you.  The patient did tell me that he is addressing his vascular risk factors.  He just had an extensive evaluation by his cardiologist.  I reviewed this too with them.  The patient does now have a diagnosis of hypertrophic cardiomyopathy.  He is going to continue to follow up with his cardiologist and I made sure his family and he had copies of recommendations and the actual consultation.      I reviewed with the patient and his family his normal MRA study of the neck arteries.  I reassured him about this.      The patient is scheduled now to have an ultrasound of his abdomen tomorrow.  This is to examine his aorta, but it would  give information about his abdomen in general, I believe.  I did again review with him his history of weight loss and his decreased appetite and I have asked that he address this with you.  I did reassure him that a chest x-ray I ordered was normal.  He did tell me he has stopped smoking and the use of alcohol now.      I went over all of his different laboratory tests that were done which are collated in his records and made sure he had copies and specifically reassured him that he had a negative or normal acetylcholine receptor antibodies done.  I do not believe now that he has any evidence of neuromuscular transmission defect such as myasthenia gravis or Eaton-Lambert syndrome.      The patient and his family did review with me their history of his slurred speech and balance issues.  They said they could not tell if this had happened anywhere from February to the end of April.  With this information, I did talk about the findings of a stroke on his MRI scan.  The patient would be a candidate for antiplatelet therapy with both Plavix and aspirin for 3 months then dating from the end of April.  Beyond 3 months, he would not probably get further benefit from antiplatelet therapy except for the use of aspirin alone.  I did ask that he review this with you and his cardiologist but did suggest that he probably stop Plavix at the end of July.  I have warned him about risk of combination therapy with specifically bleeding issues.      The patient is going to contact me after he gets his MRI scan done of the lumbar spine and go over the possibility of further treatment of this suspected lumbar issue now.  I reviewed with the patient lifting restrictions because of suspected back problems.      The patient's blood pressure today was 132/62 with a pulse of 99.      I have asked that the patient have followup with me routinely in September and on a p.r.n. basis.  Again, I made sure he had copies of all of my records here at  [facility] that were available through the Epic website.      The patient understands that if he has any new stroke-like symptoms, he is to have immediate evaluation in the emergency room.      Thank you again for allowing me to see this patient.      Sincerely yours,       Kelli Hopkins MD       I spent 30 minutes with the patient and his family today.  Over 50% of the time this involved counseling and coordination of care.         KELLI HOPKINS MD             D: 07/10/2018   T: 07/10/2018   MT: AKA      Name:     BETO BERRY   MRN:      [id number]        Account:      [id number]   :      1950           Service Date: 2018      Document: [id number]

## 2018-07-13 ENCOUNTER — HOSPITAL ENCOUNTER (OUTPATIENT)
Dept: MRI IMAGING | Facility: HOSPITAL | Age: 68
Discharge: HOME | End: 2018-07-13
Attending: PSYCHIATRY & NEUROLOGY | Admitting: INTERNAL MEDICINE
Payer: MEDICARE

## 2018-07-13 ENCOUNTER — HOSPITAL ENCOUNTER (OUTPATIENT)
Dept: ULTRASOUND IMAGING | Facility: HOSPITAL | Age: 68
End: 2018-07-13
Attending: INTERNAL MEDICINE
Payer: MEDICARE

## 2018-07-13 DIAGNOSIS — Z13.6 ENCOUNTER FOR ABDOMINAL AORTIC ANEURYSM SCREENING: ICD-10-CM

## 2018-07-13 DIAGNOSIS — M48.062 SPINAL STENOSIS OF LUMBAR REGION WITH NEUROGENIC CLAUDICATION: ICD-10-CM

## 2018-07-13 PROCEDURE — 76775 US EXAM ABDO BACK WALL LIM: CPT | Mod: TC

## 2018-07-13 PROCEDURE — 72148 MRI LUMBAR SPINE W/O DYE: CPT | Mod: TC

## 2018-07-19 ENCOUNTER — TELEPHONE (OUTPATIENT)
Dept: NEUROLOGY | Facility: CLINIC | Age: 68
End: 2018-07-19

## 2018-07-19 NOTE — TELEPHONE ENCOUNTER
Discussed mri results of the lumbar spine; he will review films with me later this summer; I advised asking primary MD about phys therapy

## 2018-07-25 ENCOUNTER — OFFICE VISIT (OUTPATIENT)
Dept: INTERNAL MEDICINE | Facility: OTHER | Age: 68
End: 2018-07-25
Attending: INTERNAL MEDICINE
Payer: MEDICARE

## 2018-07-25 VITALS
HEART RATE: 69 BPM | OXYGEN SATURATION: 98 % | TEMPERATURE: 95.1 F | DIASTOLIC BLOOD PRESSURE: 78 MMHG | WEIGHT: 260 LBS | HEIGHT: 74 IN | SYSTOLIC BLOOD PRESSURE: 110 MMHG | BODY MASS INDEX: 33.37 KG/M2

## 2018-07-25 DIAGNOSIS — N18.2 CKD (CHRONIC KIDNEY DISEASE) STAGE 2, GFR 60-89 ML/MIN: Primary | ICD-10-CM

## 2018-07-25 DIAGNOSIS — I10 BENIGN ESSENTIAL HYPERTENSION: ICD-10-CM

## 2018-07-25 DIAGNOSIS — I71.21 ASCENDING AORTIC ANEURYSM (H): ICD-10-CM

## 2018-07-25 DIAGNOSIS — I42.2 HYPERTROPHIC CARDIOMYOPATHY (H): ICD-10-CM

## 2018-07-25 DIAGNOSIS — Z86.73 HISTORY OF CVA (CEREBROVASCULAR ACCIDENT): ICD-10-CM

## 2018-07-25 DIAGNOSIS — E11.8 TYPE 2 DIABETES MELLITUS WITH COMPLICATION, WITHOUT LONG-TERM CURRENT USE OF INSULIN (H): ICD-10-CM

## 2018-07-25 PROCEDURE — 36415 COLL VENOUS BLD VENIPUNCTURE: CPT | Mod: ZL | Performed by: INTERNAL MEDICINE

## 2018-07-25 PROCEDURE — 99215 OFFICE O/P EST HI 40 MIN: CPT | Performed by: INTERNAL MEDICINE

## 2018-07-25 PROCEDURE — 80053 COMPREHEN METABOLIC PANEL: CPT | Mod: ZL | Performed by: INTERNAL MEDICINE

## 2018-07-25 PROCEDURE — G0463 HOSPITAL OUTPT CLINIC VISIT: HCPCS

## 2018-07-25 PROCEDURE — 2894A VOIDCORRECT: CPT | Performed by: INTERNAL MEDICINE

## 2018-07-25 ASSESSMENT — ANXIETY QUESTIONNAIRES
6. BECOMING EASILY ANNOYED OR IRRITABLE: NOT AT ALL
2. NOT BEING ABLE TO STOP OR CONTROL WORRYING: NOT AT ALL
5. BEING SO RESTLESS THAT IT IS HARD TO SIT STILL: NOT AT ALL
4. TROUBLE RELAXING: NOT AT ALL
3. WORRYING TOO MUCH ABOUT DIFFERENT THINGS: NOT AT ALL
7. FEELING AFRAID AS IF SOMETHING AWFUL MIGHT HAPPEN: NOT AT ALL
1. FEELING NERVOUS, ANXIOUS, OR ON EDGE: NOT AT ALL
GAD7 TOTAL SCORE: 0

## 2018-07-25 ASSESSMENT — PAIN SCALES - GENERAL: PAINLEVEL: NO PAIN (0)

## 2018-07-25 NOTE — PROGRESS NOTES
"  SUBJECTIVE:   Deon Culver is a 68 year old male who presents to clinic today for the following health issues:      Medication recheck      Duration: 2.5 months     Description (location/character/radiation): has been on plavix 2.5 months- received My Chart message questioning if he should continue or not after July     Intensity:  n/a    Accompanying signs and symptoms: n/a    History (similar episodes/previous evaluation): [facility]    Precipitating or alleviating factors: None    Therapies tried and outcome: None       Musculoskeletal problem/pain      Duration: months    Description  Location: bilateral leg weakeness    Intensity:  mild    Accompanying signs and symptoms: weakness of bilateral legs    History  Previous similar problem: no   Previous evaluation:  MRI (spinal stenosis) - pt states it is getting better    Precipitating or alleviating factors:  Trauma or overuse: no - just has \"gone out\" many times  Aggravating factors include: unknown    Therapies tried and outcome: nothing    Mendoza presents today once again for follow up fror recent diagnosis of numerous disorders including  CVA, Cardiomyopathy, Ascending aortic aneurysm, HTN, CKD, Diabetes and DJD of the lumbar spine.  Currently his BP is under control and he has follow up of his cardiomyopathy and aortic aneurysm in 6 months and follow up of his CVA next month.  It was reccommended that he stop Plavix after the completion of 3 months of therapy.  Additional questions today including what can be done about DJD spine?  Also they have some concern as to his elevated Cr and mild hypercalcemia.  Otherwise Mendoza is doing fairly well.  Denies any chest pain or SOB.  Recent A1C is down to 7.7.      Problem list and histories reviewed & adjusted, as indicated.  Additional history: as documented    Patient Active Problem List   Diagnosis     Benign essential hypertension     Obesity     Type 2 diabetes mellitus with complication, without long-term current " use of insulin (H)     Chronic fatigue     Slurred speech x 3 months, CT neg ED [facility] 4/25/18     Vision changes x 3 months as of 4/18     Chronic bilateral low back pain with sciatica     Altered gait     Altered mental status - since approx 1/18 - delayed response, change in mentation     Hypertrophic cardiomyopathy at 2.0 cm     Ascending aortic aneurysm at 4.4 cm on echo from 6/20/18     SOB (shortness of breath)     Right ventricular enlargement     Chronic kidney disease, stage 3 (moderate)     Encounter for abdominal aortic aneurysm screening     History of tobacco abuse     H/O ETOH abuse     Tobacco abuse counseling     History reviewed. No pertinent surgical history.    Social History   Substance Use Topics     Smoking status: Former Smoker     Packs/day: 0.50     Types: Cigars     Start date: 1/1/2003     Quit date: 6/14/2018     Smokeless tobacco: Never Used     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      Family History   Problem Relation Age of Onset     Colon Cancer Mother      Kidney Cancer Father      KIDNEY DISEASE Father      Coronary Artery Disease Brother          Current Outpatient Prescriptions   Medication Sig Dispense Refill     ASPIRIN PO Take 81 mg by mouth daily       atorvastatin (LIPITOR) 40 MG tablet Take 1 tablet (40 mg) by mouth daily 30 tablet 1     Cholecalciferol (VITAMIN D-3) 1000 units CAPS Take 1,000 Units by mouth       CINNAMON PO Take 1,000 mg by mouth       clopidogrel (PLAVIX) 75 MG tablet Take 1 tablet (75 mg) by mouth daily 30 tablet 1     Garlic 1000 MG CAPS        lisinopril (PRINIVIL/ZESTRIL) 40 MG tablet Take 1 tablet (40 mg) by mouth daily 30 tablet 3     metFORMIN (GLUCOPHAGE-XR) 500 MG 24 hr tablet Take 2 tablets (1,000 mg) by mouth 2 times daily (with meals) 360 tablet 1     metoprolol tartrate (LOPRESSOR) 50 MG tablet TAKE 1 TABLET BY MOUTH TWICE DAILY 60 tablet 8     tamsulosin (FLOMAX) 0.4 MG capsule Take 1 capsule (0.4 mg) by mouth daily 90 capsule 3  "    TURMERIC CURCUMIN PO Take 1,500 mg by mouth       No Known Allergies  Recent Labs   Lab Test  07/06/18   1426  07/02/18   1443   06/20/18   1017   04/30/18   1048 04/25/18   A1C  7.7*   --    --    --    --    --   10.5*   LDL   --    --    --    --    --   86   --    HDL   --    --    --    --    --   50   --    TRIG   --    --    --    --    --   196*   --    ALT   --   22   --   30   --    --   23   CR  2.11*  1.96*   --    --    < >   --   1.05   GFRESTIMATED  31*  34*   < >   --    < >   --    --    GFRESTBLACK  38*  41*   < >   --    < >   --    --    POTASSIUM  5.1  4.6   --    --    < >   --   4.0   TSH   --    --    --    --    --   1.43   --     < > = values in this interval not displayed.      BP Readings from Last 3 Encounters:   07/25/18 110/78   07/09/18 133/62   07/06/18 115/67    Wt Readings from Last 3 Encounters:   07/25/18 260 lb (117.9 kg)   07/09/18 260 lb (117.9 kg)   07/06/18 260 lb (117.9 kg)               Reviewed and updated as needed this visit by clinical staff       Reviewed and updated as needed this visit by Provider         ROS:  Constitutional, HEENT, cardiovascular, pulmonary, gi and gu systems are negative, except as otherwise noted.    OBJECTIVE:     /78 (BP Location: Left arm, Patient Position: Sitting, Cuff Size: Adult Large)  Pulse 69  Temp 95.1  F (35.1  C) (Tympanic)  Ht 6' 2\" (1.88 m)  Wt 260 lb (117.9 kg)  SpO2 98%  BMI 33.38 kg/m2  Body mass index is 33.38 kg/(m^2).   GENERAL: Alert and no distress  RESP: lungs clear to auscultation - no rales, rhonchi or wheezes  CV: regular rate and rhythm, normal S1 S2, no S3 or S4, no murmur, click or rub, no peripheral edema and peripheral pulses strong  MS: no gross musculoskeletal defects noted, no edema  SKIN: Dry skin on feet.   NEURO: Monofilament testing of LE reveals sensation intact in both feet.   PSYCH: mentation appears normal, affect normal/bright    Diagnostic Test Results:  No results found for this or " any previous visit (from the past 24 hour(s)).  Results for orders placed or performed during the hospital encounter of 07/13/18   MRI Lumbar spine w/o contrast    Narrative    PROCEDURE: MR LUMBAR SPINE W/O CONTRAST 7/13/2018 4:04 PM    HISTORY: ; Spinal stenosis of lumbar region with neurogenic  claudication    COMPARISONS: None.    Meds/Dose Given:    TECHNIQUE: Images were obtained sagittally T1 STIR and T2 weighted.  Images were obtained axially proton density and T2-weighted    FINDINGS: The T11-T12 T12-L1 discs are normal. At L1-L2 there is some  broad-based annular bulging mildly flattening the ventral aspect of  the subarachnoid space. At L2-L3 mild annular bulging is noted mildly  flattening the ventral aspect of the subarachnoid space. At L3-L4  there is severe facet joint degenerative change there is a small  synovial cyst seen on the right. This results in compression of the  right L4 nerve root. Broad based annular bulging is noted. At L4-L5  severe facet joint degenerative changes are noted there is not mild  nonspondylolytic spondylolisthesis. There is ligamenta flava  hypertrophy which narrows the lateral recesses at L4-L5 bilaterally  compressing the L5 nerve roots worse on the left than the right. At  L5-S1 advanced facet joint degenerative changes are noted. The disc is  normal in height is no evidence of disc herniation or protrusion.  Intradurally the nerves of cauda equina and conus appear normal. The  paravertebral soft tissues appear normal.         Impression    IMPRESSION: Advanced facet joint degenerative changes in the lower  lumbar spine at L3-L4, L4-L5, and L5-S1.. At L4-L5 the combination of  facet joint degenerative changes nonspondylolytic spondylolisthesis  and ligamentum flavum hypertrophy results in some compression of both  L5 nerve roots in the lateral recesses. At L3-L4 ligamentum flavum  hypertrophy and a small synovial cyst compresses the right L4 nerve  root    HERSON  MD DANIELLE       ASSESSMENT/PLAN:     Problem List Items Addressed This Visit     Benign essential hypertension    Type 2 diabetes mellitus with complication, without long-term current use of insulin (H)    Hypertrophic cardiomyopathy at 2.0 cm    Ascending aortic aneurysm at 4.4 cm on echo from 6/20/18    History of CVA (cerebrovascular accident)      Other Visit Diagnoses     CKD (chronic kidney disease) stage 2, GFR 60-89 ml/min    -  Primary    Relevant Orders    Comprehensive metabolic panel (BMP + Alb, Alk Phos, ALT, AST, Total. Bili, TP) (Completed)             Stop Plavix in 1 week and continue Asa indefinitely.    Repeat labs today to assess kidney function.  Follow up Neurology as scheduled.  Follow up Cardiology in 6 months.  Labs today-> If Cr remains elevated consider holiday from Lisinopril and recheck.    MEDICATIONS:  Continue current medications without change  Follow up with me in 3 months          40 minutes was spent on this patient's care today.  Greater than 50% of the time was spent face to face with the patient, his significant other and his daughter.  Items were discussed as listed above and care plan reviewed.  Numerous questions were answered and previous notes and tests from Neurology and Cardiology were reviewed today including the echo cardiogram, labs and Lumbar MRI    Sreedhar Mendieta, [facility]

## 2018-07-25 NOTE — PATIENT INSTRUCTIONS
Stop Plavix in 1 week and continue Asa.  Repeat labs today to assess kidney function.  Follow up Neurology as scheduled.  Follow up Cardiology in 6 months.  Labs today.

## 2018-07-25 NOTE — MR AVS SNAPSHOT
After Visit Summary   7/25/2018    Deon Culver    MRN: [id number]           Patient Information     Date Of Birth          1950        Visit Information        Provider Department      7/25/2018 3:00 PM Sreedhar Mendieta, DO [facility]        Today's Diagnoses     CKD (chronic kidney disease) stage 2, GFR 60-89 ml/min    -  1      Care Instructions    Stop Plavix in 1 week and continue Asa.  Repeat labs today to assess kidney function.  Follow up Neurology as scheduled.  Follow up Cardiology in 6 months.  Labs today.               Follow-ups after your visit        Your next 10 appointments already scheduled     Sep 12, 2018  2:30 PM CDT   (Arrive by 2:15 PM)   Return Visit with Jean-Claude Hopkins MD   [facility] Neurology ([facility] [place])    [address] [place] [id number] [phone]            Jan 16, 2019  4:00 PM CST   (Arrive by 3:45 PM)   Return Visit with Chas Armendariz,    [facility] ([facility] )    [address] [place] [id number]   [phone]              Who to contact     If you have questions or need follow up information about today's clinic visit or your schedule please contact [facility] directly at [phone].  Normal or non-critical lab and imaging results will be communicated to you by MyChart, letter or phone within 4 business days after the clinic has received the results. If you do not hear from us within 7 days, please contact the clinic through MyChart or phone. If you have a critical or abnormal lab result, we will notify you by phone as soon as possible.  Submit refill requests through "[employer] or call your pharmacy and they will forward the refill request to us. Please allow 3 business days for your refill to be completed.          Additional Information About Your Visit        "[employer] Information     "[employer] gives you secure access to  "your electronic health record. If you see a primary care provider, you can also send messages to your care team and make appointments. If you have questions, please call your primary care clinic.  If you do not have a primary care provider, please call [phone] and they will assist you.        Care EveryWhere ID     This is your Care EveryWhere ID. This could be used by other organizations to access your [facility] medical records  [id number]        Your Vitals Were     Pulse Temperature Height Pulse Oximetry BMI (Body Mass Index)       69 95.1  F (35.1  C) (Tympanic) 6' 2\" (1.88 m) 98% 33.38 kg/m2        Blood Pressure from Last 3 Encounters:   07/25/18 110/78   07/09/18 133/62   07/06/18 115/67    Weight from Last 3 Encounters:   07/25/18 260 lb (117.9 kg)   07/09/18 260 lb (117.9 kg)   07/06/18 260 lb (117.9 kg)              We Performed the Following     Comprehensive metabolic panel (BMP + Alb, Alk Phos, ALT, AST, Total. Bili, TP)        Primary Care Provider Office Phone # Fax #    Sreedhar Mendieta, [phone]       [address] [place] [address]        Equal Access to Services     ELVIA NAVA : Hadii bonnie ku hadasho Soomaali, waaxda luqadaha, qaybta kaalmada adeegyada, waxguanaco mckeon . So [facility] [phone].    ATENCIÓN: Si habla español, tiene a rodriguez disposición servicios gratuitos de asistencia lingüística. Llame al [phone].    We comply with applicable federal civil rights laws and [place] laws. We do not discriminate on the basis of race, color, national origin, age, disability, sex, sexual orientation, or gender identity.            Thank you!     Thank you for choosing [facility]  for your care. Our goal is always to provide you with excellent care. Hearing back from our patients is one way we can continue to improve our services. Please take a few minutes to complete the written survey that you may receive in the mail after " your visit with us. Thank you!             Your Updated Medication List - Protect others around you: Learn how to safely use, store and throw away your medicines at www.disposemymeds.org.          This list is accurate as of 7/25/18  3:23 PM.  Always use your most recent med list.                   Brand Name Dispense Instructions for use Diagnosis    ASPIRIN PO      Take 81 mg by mouth daily        atorvastatin 40 MG tablet    LIPITOR    30 tablet    Take 1 tablet (40 mg) by mouth daily    Hyperlipidemia LDL goal <100       CINNAMON PO      Take 1,000 mg by mouth        clopidogrel 75 MG tablet    PLAVIX    30 tablet    Take 1 tablet (75 mg) by mouth daily    History of CVA (cerebrovascular accident)       Garlic 1000 MG Caps           lisinopril 40 MG tablet    PRINIVIL/ZESTRIL    30 tablet    Take 1 tablet (40 mg) by mouth daily    Benign essential hypertension       metFORMIN 500 MG 24 hr tablet    GLUCOPHAGE-XR    360 tablet    Take 2 tablets (1,000 mg) by mouth 2 times daily (with meals)    Type 2 diabetes mellitus with complication, without long-term current use of insulin (H)       metoprolol tartrate 50 MG tablet    LOPRESSOR    60 tablet    TAKE 1 TABLET BY MOUTH TWICE DAILY    Benign essential hypertension       tamsulosin 0.4 MG capsule    FLOMAX    90 capsule    Take 1 capsule (0.4 mg) by mouth daily    Benign prostatic hyperplasia, unspecified whether lower urinary tract symptoms present       TURMERIC CURCUMIN PO      Take 1,500 mg by mouth        Vitamin D-3 1000 units Caps      Take 1,000 Units by mouth

## 2018-07-26 LAB
ALBUMIN SERPL-MCNC: 4.2 G/DL (ref 3.4–5)
ALP SERPL-CCNC: 67 U/L (ref 40–150)
ALT SERPL W P-5'-P-CCNC: 27 U/L (ref 0–70)
ANION GAP SERPL CALCULATED.3IONS-SCNC: 10 MMOL/L (ref 3–14)
AST SERPL W P-5'-P-CCNC: 9 U/L (ref 0–45)
BILIRUB SERPL-MCNC: 0.8 MG/DL (ref 0.2–1.3)
BUN SERPL-MCNC: 33 MG/DL (ref 7–30)
CALCIUM SERPL-MCNC: 9.7 MG/DL (ref 8.5–10.1)
CHLORIDE SERPL-SCNC: 99 MMOL/L (ref 94–109)
CO2 SERPL-SCNC: 23 MMOL/L (ref 20–32)
CREAT SERPL-MCNC: 1.53 MG/DL (ref 0.66–1.25)
GFR SERPL CREATININE-BSD FRML MDRD: 46 ML/MIN/1.7M2
GLUCOSE SERPL-MCNC: 100 MG/DL (ref 70–99)
POTASSIUM SERPL-SCNC: 4.8 MMOL/L (ref 3.4–5.3)
PROT SERPL-MCNC: 7.2 G/DL (ref 6.8–8.8)
SODIUM SERPL-SCNC: 132 MMOL/L (ref 133–144)

## 2018-07-26 ASSESSMENT — ANXIETY QUESTIONNAIRES: GAD7 TOTAL SCORE: 0

## 2018-07-26 ASSESSMENT — PATIENT HEALTH QUESTIONNAIRE - PHQ9: SUM OF ALL RESPONSES TO PHQ QUESTIONS 1-9: 0

## 2018-08-09 DIAGNOSIS — E78.5 HYPERLIPIDEMIA LDL GOAL <100: ICD-10-CM

## 2018-08-09 NOTE — TELEPHONE ENCOUNTER
lipitor      Last Written Prescription Date:  6/14/18  Last Fill Quantity: 30,   # refills: 1  Last Office Visit: 7/25/18  Future Office visit:  none

## 2018-08-10 RX ORDER — ATORVASTATIN CALCIUM 40 MG/1
TABLET, FILM COATED ORAL
Qty: 30 TABLET | Refills: 1 | Status: SHIPPED | OUTPATIENT
Start: 2018-08-10 | End: 2018-09-21

## 2018-08-14 ENCOUNTER — TELEPHONE (OUTPATIENT)
Dept: INTERNAL MEDICINE | Facility: OTHER | Age: 68
End: 2018-08-14

## 2018-08-14 DIAGNOSIS — Z12.11 ENCOUNTER FOR SCREENING FECAL OCCULT BLOOD TESTING: Primary | ICD-10-CM

## 2018-08-14 NOTE — TELEPHONE ENCOUNTER
Pt was notified is due to a colonoscopy or an ifob. Patient opted for an ifob. Will come to clinic to  kit. Ninoska Red LPN

## 2018-08-23 ENCOUNTER — TRANSFERRED RECORDS (OUTPATIENT)
Dept: HEALTH INFORMATION MANAGEMENT | Facility: CLINIC | Age: 68
End: 2018-08-23

## 2018-08-23 DIAGNOSIS — I10 BENIGN ESSENTIAL HYPERTENSION: ICD-10-CM

## 2018-08-24 DIAGNOSIS — Z12.11 ENCOUNTER FOR SCREENING FECAL OCCULT BLOOD TESTING: ICD-10-CM

## 2018-08-24 LAB — HEMOCCULT SP1 STL QL: POSITIVE

## 2018-08-24 PROCEDURE — 82274 ASSAY TEST FOR BLOOD FECAL: CPT | Mod: ZL | Performed by: INTERNAL MEDICINE

## 2018-08-24 RX ORDER — LISINOPRIL 40 MG/1
TABLET ORAL
Qty: 30 TABLET | Refills: 3 | Status: SHIPPED | OUTPATIENT
Start: 2018-08-24 | End: 2018-11-02

## 2018-08-24 NOTE — TELEPHONE ENCOUNTER
lisinopril      Last Written Prescription Date:  5/2/18  Last Fill Quantity: 30,   # refills: 3  Last Office Visit: 7/25/18  Future Office visit:   none

## 2018-09-12 ENCOUNTER — OFFICE VISIT (OUTPATIENT)
Dept: NEUROLOGY | Facility: CLINIC | Age: 68
End: 2018-09-12
Payer: COMMERCIAL

## 2018-09-12 VITALS
SYSTOLIC BLOOD PRESSURE: 148 MMHG | HEIGHT: 74 IN | WEIGHT: 263.5 LBS | BODY MASS INDEX: 33.82 KG/M2 | HEART RATE: 71 BPM | DIASTOLIC BLOOD PRESSURE: 88 MMHG

## 2018-09-12 DIAGNOSIS — M54.40 CHRONIC BILATERAL LOW BACK PAIN WITH SCIATICA, SCIATICA LATERALITY UNSPECIFIED: Primary | ICD-10-CM

## 2018-09-12 DIAGNOSIS — G89.29 CHRONIC BILATERAL LOW BACK PAIN WITH SCIATICA, SCIATICA LATERALITY UNSPECIFIED: Primary | ICD-10-CM

## 2018-09-12 DIAGNOSIS — R93.0 ABNORMAL MAGNETIC RESONANCE IMAGING OF HEAD: ICD-10-CM

## 2018-09-12 ASSESSMENT — PAIN SCALES - GENERAL: PAINLEVEL: NO PAIN (0)

## 2018-09-12 NOTE — MR AVS SNAPSHOT
"              After Visit Summary   9/12/2018    Deon Culver    MRN: [id number]           Patient Information     Date Of Birth          1950        Visit Information        Provider Department      9/12/2018 2:30 PM Jean-Claude Hopkins MD [facility] Neurology        Today's Diagnoses     Chronic bilateral low back pain with sciatica, sciatica laterality unspecified    -  1    Abnormal magnetic resonance imaging of head           Follow-ups after your visit        Additional Services     PHYSICAL THERAPY REFERRAL       *This therapy referral will be filtered to a centralized scheduling office at [facility] and the patient will receive a call to schedule an appointment at a [place] location most convenient for them. *     [facility] provides Physical Therapy evaluation and treatment and many specialty services across the [place] system.  If requesting a specialty program, please choose from the list below.    If you have not heard from the scheduling office within 2 business days, please call [phone] for all locations, with the exception of [place], please call [phone] and [facility], please call [phone]  Treatment: Evaluation & Treatment  Special Instructions/Modalities:eval and treat  Special Programs: eval and treat    Please be aware that coverage of these services is subject to the terms and limitations of your health insurance plan.  Call member services at your health plan with any benefit or coverage questions.      **Note to Provider:  If you are referring outside of [place] for the therapy appointment, please list the name of the location in the \"special instructions\" above, print the referral and give to the patient to schedule the appointment.                  Follow-up notes from your care team     Return in about 6 months (around 3/22/2019).      Your next 10 appointments already scheduled     Jan 16, 2019  4:00 PM CST   (Arrive by " "3:45 PM)   Return Visit with Chas Armendariz, DO   [facility] [place] ([facility] [place] )    [address]  [place] [address]   [phone]            Mar 13, 2019  2:00 PM CDT   (Arrive by 1:45 PM)   Return Visit with Jean-Claude Hopkins MD   [facility] Neurology ([facility] [place])    [address]  3rd Floor  [facility] [id number] [phone]              Future tests that were ordered for you today     Open Future Orders        Priority Expected Expires Ordered    MRI Brain w/o contrast Routine 9/12/2018 9/12/2019 9/12/2018            Who to contact     Please call your clinic at [phone] to:    Ask questions about your health    Make or cancel appointments    Discuss your medicines    Learn about your test results    Speak to your doctor            Additional Information About Your Visit        [employer] Information     [employer] gives you secure access to your electronic health record. If you see a primary care provider, you can also send messages to your care team and make appointments. If you have questions, please call your primary care clinic.  If you do not have a primary care provider, please call [phone] and they will assist you.      [employer] is an electronic gateway that provides easy, online access to your medical records. With [employer], you can request a clinic appointment, read your test results, renew a prescription or communicate with your care team.     To access your existing account, please contact your [facility] or call [phone] for assistance.        Care EveryWhere ID     This is your Care EveryWhere ID. This could be used by other organizations to access your [place] medical records  [id number]        Your Vitals Were     Pulse Height BMI (Body Mass Index)             71 1.88 m (6' 2\") 33.83 kg/m2          Blood Pressure from Last 3 Encounters:   09/12/18 148/88   07/25/18 110/78 "   07/09/18 133/62    Weight from Last 3 Encounters:   09/12/18 119.5 kg (263 lb 8 oz)   07/25/18 117.9 kg (260 lb)   07/09/18 117.9 kg (260 lb)              We Performed the Following     PHYSICAL THERAPY REFERRAL        Primary Care Provider Office Phone # Fax #    Sreedhar Mendieta [phone]       [address] [place] [address]        Equal Access to Services     ELVIA NAVA : Hadii aad ku hadasho Soomaali, waaxda luqadaha, qaybta kaalmada adeegyada, waxay idiin hayaan adeeg joannatrinityдмитрий laestelita . So [facility] [phone].    ATENCIÓN: Si habla español, tiene a rodriguez disposición servicios gratuitos de asistencia lingüística. [facility] [phone].    We comply with applicable federal civil rights laws and [place] laws. We do not discriminate on the basis of race, color, national origin, age, disability, sex, sexual orientation, or gender identity.            Thank you!     Thank you for choosing [facility] NEUROLOGY  for your care. Our goal is always to provide you with excellent care. Hearing back from our patients is one way we can continue to improve our services. Please take a few minutes to complete the written survey that you may receive in the mail after your visit with us. Thank you!             Your Updated Medication List - Protect others around you: Learn how to safely use, store and throw away your medicines at www.disposemymeds.org.          This list is accurate as of 9/12/18  4:13 PM.  Always use your most recent med list.                   Brand Name Dispense Instructions for use Diagnosis    ASPIRIN PO      Take 81 mg by mouth daily        atorvastatin 40 MG tablet    LIPITOR    30 tablet    TAKE 1 TABLET BY MOUTH ONCE DAILY    Hyperlipidemia LDL goal <100       CINNAMON PO      Take 1,000 mg by mouth        clopidogrel 75 MG tablet    PLAVIX    30 tablet    Take 1 tablet (75 mg) by mouth daily    History of CVA (cerebrovascular accident)       Garlic 1000 MG Caps            lisinopril 40 MG tablet    PRINIVIL/ZESTRIL    30 tablet    TAKE 1 TABLET BY MOUTH ONCE DAILY    Benign essential hypertension       metFORMIN 500 MG 24 hr tablet    GLUCOPHAGE-XR    360 tablet    Take 2 tablets (1,000 mg) by mouth 2 times daily (with meals)    Type 2 diabetes mellitus with complication, without long-term current use of insulin (H)       metoprolol tartrate 50 MG tablet    LOPRESSOR    60 tablet    TAKE 1 TABLET BY MOUTH TWICE DAILY    Benign essential hypertension       tamsulosin 0.4 MG capsule    FLOMAX    90 capsule    Take 1 capsule (0.4 mg) by mouth daily    Benign prostatic hyperplasia, unspecified whether lower urinary tract symptoms present       TURMERIC CURCUMIN PO      Take 1,500 mg by mouth        Vitamin D-3 1000 units Caps      Take 1,000 Units by mouth

## 2018-09-12 NOTE — LETTER
2018       RE: Deon Culver  [address]  [facility] [address]     Dear Colleague,    Thank you for referring your patient, Deon Culver, to the [facility] NEUROLOGY at [facility]. Please see a copy of my visit note below.    Service Date: 2018         RE: Deon Culver   MRN: [id number]   : 1950      Dear Dr. Mendieta:      This is in regard to followup on Deon Culver.  The patient returned today with a chief complaint of presumed stroke with abnormal MRI scan, low back pain with radicular symptoms and imbalance.      The patient was accompanied by his wife and daughter.  He said he is feeling much better now than when I saw him on his last visit.  He said taking care of his health has made quite a difference.  He is pleased with your help.  I did review with him his most recent blood work from 2018.  On his metabolic profile his creatinine was 1.53 and his glucose was now 100.  His sodium was 132.  The patient did have on 2018 an A1c hemoglobin of 7.7.  The patient did tell me he completely has given up wine.  He did also review with me his consultation with the cardiologist and he is due to be seen again in January.  He is going to see you in October.  He is being followed also for a 4.4 cm aortic aneurysm.  He said his legs feel better in general.  He denied any difficulty with swallowing.  He notes his speech has improved since February or March when his family first noted a change.  He has not had paresthesias, headache, change in vision including amaurosis fugax or diplopia and his balance does seem to be better.      The patient did undergo at my suggestion an MRI scan that I reviewed with them from 2018 of the lumbar spine.  It showed not unexpectedly advanced facet degenerative changes at multiple levels and he had at L4-L5 a nonspondylotic spondylolisthesis with ligamentum flavum hypertrophy resulting in some compression  of both L5 nerve roots in the lateral recess.  At the L3-L4 level, he had ligamentum flavum hypertrophy and a small synovial cyst compressing the right L4 nerve root.  The patient is not interested in having neurosurgical or orthopedic spine consultation, but did express an interest in getting therapy in [place] through a physical therapist.  I gave him a referral.  The patient and his family are aware of his abnormal MRI scan.  Although this does suggest a stroke in the lower brainstem, the radiologist did indicate that it should be repeated to help rule out some other process.  Because of the recommendation in the indeterminate nature of the lesion, I did ask that he have a followup MRI scan now done of the brain.      Neurologic examination revealed a pleasant, obese man.  His blood pressure for our medical assistant using a machine was 148/80 with a pulse of 71.  I used a soft cuff and it was 122/74 with a pulse of 72.  He did not have cervical bruits.  He had a regular cardiac rhythm without gallops or murmurs.  His lungs were clear to auscultation.  The patient was able to get out of a chair without using his arms.  He walked well today.  He did have trouble with tandem and could not really do it.  He had negative Romberg.  He had no dysmetria.  Muscle stretch reflexes were absent except for knee jerks being present.  His right toe was downward going to plantar stimulation.  The left toe was extensor.  Strength testing was normal.  He had good rapid alternating motion rate, and had no signs of rigidity or cogwheeling.  He had full visual fields to confrontation and good extraocular movements.  The patient did not seem to be dysarthric to me.      IMPRESSION:   1.  Presumed stroke that happened sometime in the late winter or spring.   2.  Multiple vascular risk factors which are being addressed.     3.  Lumbar spondylosis with spondylolisthesis causing previous lumbar radicular problems which seem to have  improved.      The patient has a complicated history and has a number of different issues.  He was found to have a hypertrophic cardiomyopathy.  He is going to continue follow-up with you and his cardiologist.  Overall, he is better since he has been aggressive treating his medical problems.  I have asked that he have followup with me after the MRI scan of the brain is done.  He will start physical therapy in [place] concerning his back.      I did discuss with the patient lifting restrictions also.  He and his family know he should have immediate evaluation if he has any new neurologic complaints because of his risk factors for stroke and vascular disease in general.         I spent 30 minutes with the patient.  Over 50% of the time this involved counseling and coordination of care.        D: 09/15/2018   T: 2018   MT: AKA      Name:     BETO BERRY   MRN:      [id number]        Account:      [id number]   :      1950           Service Date: 2018      Document: [id number]       Again, thank you for allowing me to participate in the care of your patient.      Sincerely,    Jean-Claude Hopkins MD    CC:     Sreedhar Mendieta,    [facility]    [address] [place] [address] [place] [address]

## 2018-09-17 ENCOUNTER — MYC MEDICAL ADVICE (OUTPATIENT)
Dept: INTERNAL MEDICINE | Facility: OTHER | Age: 68
End: 2018-09-17

## 2018-09-17 DIAGNOSIS — Z86.73 HISTORY OF CVA (CEREBROVASCULAR ACCIDENT): Primary | ICD-10-CM

## 2018-09-17 NOTE — PROGRESS NOTES
Service Date: 2018      Sreedhar Mendieta,    [facility]    [address] [place] [address]      RE: Deon Culver   MRN: [id number]   : 1950      Dear Dr. Mendieta:      This is in regard to followup on Deon Culver.  The patient returned today with a chief complaint of presumed stroke with abnormal MRI scan, low back pain with radicular symptoms and imbalance.      The patient was accompanied by his wife and daughter.  He said he is feeling much better now than when I saw him on his last visit.  He said taking care of his health has made quite a difference.  He is pleased with your help.  I did review with him his most recent blood work from 2018.  On his metabolic profile his creatinine was 1.53 and his glucose was now 100.  His sodium was 132.  The patient did have on 2018 an A1c hemoglobin of 7.7.  The patient did tell me he completely has given up wine.  He did also review with me his consultation with the cardiologist and he is due to be seen again in January.  He is going to see you in October.  He is being followed also for a 4.4 cm aortic aneurysm.  He said his legs feel better in general.  He denied any difficulty with swallowing.  He notes his speech has improved since February or March when his family first noted a change.  He has not had paresthesias, headache, change in vision including amaurosis fugax or diplopia and his balance does seem to be better.      The patient did undergo at my suggestion an MRI scan that I reviewed with them from 2018 of the lumbar spine.  It showed not unexpectedly advanced facet degenerative changes at multiple levels and he had at L4-L5 a nonspondylotic spondylolisthesis with ligamentum flavum hypertrophy resulting in some compression of both L5 nerve roots in the lateral recess.  At the L3-L4 level, he had ligamentum flavum hypertrophy and a small synovial cyst compressing the right L4 nerve root.  The  patient is not interested in having neurosurgical or orthopedic spine consultation, but did express an interest in getting therapy in [place] through a physical therapist.  I gave him a referral.  The patient and his family are aware of his abnormal MRI scan.  Although this does suggest a stroke in the lower brainstem, the radiologist did indicate that it should be repeated to help rule out some other process.  Because of the recommendation in the indeterminate nature of the lesion, I did ask that he have a followup MRI scan now done of the brain.      Neurologic examination revealed a pleasant, obese man.  His blood pressure for our medical assistant using a machine was 148/80 with a pulse of 71.  I used a soft cuff and it was 122/74 with a pulse of 72.  He did not have cervical bruits.  He had a regular cardiac rhythm without gallops or murmurs.  His lungs were clear to auscultation.  The patient was able to get out of a chair without using his arms.  He walked well today.  He did have trouble with tandem and could not really do it.  He had negative Romberg.  He had no dysmetria.  Muscle stretch reflexes were absent except for knee jerks being present.  His right toe was downward going to plantar stimulation.  The left toe was extensor.  Strength testing was normal.  He had good rapid alternating motion rate, and had no signs of rigidity or cogwheeling.  He had full visual fields to confrontation and good extraocular movements.  The patient did not seem to be dysarthric to me.      IMPRESSION:   1.  Presumed stroke that happened sometime in the late winter or spring.   2.  Multiple vascular risk factors which are being addressed.     3.  Lumbar spondylosis with spondylolisthesis causing previous lumbar radicular problems which seem to have improved.      The patient has a complicated history and has a number of different issues.  He was found to have a hypertrophic cardiomyopathy.  He is going to continue  follow-up with you and his cardiologist.  Overall, he is better since he has been aggressive treating his medical problems.  I have asked that he have followup with me after the MRI scan of the brain is done.  He will start physical therapy in [place] concerning his back.      I did discuss with the patient lifting restrictions also.  He and his family know he should have immediate evaluation if he has any new neurologic complaints because of his risk factors for stroke and vascular disease in general.      Thank you for allowing me to see this patient.      Sincerely yours,       Kelli Hopkins MD      I spent 30 minutes with the patient.  Over 50% of the time this involved counseling and coordination of care.         KELLI HOPKINS MD             D: 09/15/2018   T: 2018   MT: AKA      Name:     BETO BERRY   MRN:      [id number]        Account:      [id number]   :      1950           Service Date: 2018      Document: [id number]

## 2018-10-04 ENCOUNTER — HOSPITAL ENCOUNTER (OUTPATIENT)
Dept: PHYSICAL THERAPY | Facility: HOSPITAL | Age: 68
Setting detail: THERAPIES SERIES
End: 2018-10-04
Attending: INTERNAL MEDICINE
Payer: MEDICARE

## 2018-10-04 ENCOUNTER — HOSPITAL ENCOUNTER (OUTPATIENT)
Dept: MRI IMAGING | Facility: HOSPITAL | Age: 68
Discharge: HOME | End: 2018-10-04
Attending: PSYCHIATRY & NEUROLOGY | Admitting: PSYCHIATRY & NEUROLOGY
Payer: MEDICARE

## 2018-10-04 DIAGNOSIS — R93.0 ABNORMAL MAGNETIC RESONANCE IMAGING OF HEAD: ICD-10-CM

## 2018-10-04 PROCEDURE — 97110 THERAPEUTIC EXERCISES: CPT | Mod: GP

## 2018-10-04 PROCEDURE — 70551 MRI BRAIN STEM W/O DYE: CPT | Mod: TC

## 2018-10-04 PROCEDURE — G8978 MOBILITY CURRENT STATUS: HCPCS | Mod: GP,CI

## 2018-10-04 PROCEDURE — G8979 MOBILITY GOAL STATUS: HCPCS | Mod: GP,CI

## 2018-10-04 PROCEDURE — 97162 PT EVAL MOD COMPLEX 30 MIN: CPT | Mod: GP

## 2018-10-04 NOTE — PROGRESS NOTES
Initial Physical Therapy Evaluation      Name: Deon Culver MRN# [id number]   Age: 68 year old YOB: 1950     Date of Consultation: October 4, 2018  Primary care provider: Sreedhar Mendieta    Referring Physician: Dr. Mendieta  Orders: Eval and Treat  Medical Diagnosis: History of CVA  Onset of Illness/Injury: March 2018    Reason for PT Visit: Patient is a 69 y/o male who presents with LE weakness and balance issues. No pain currently in low back area. In March - had very high blood pressure and family noticed he was slurring his speech. Went into the doctor where they thought he suffered a stroke. Did not go to the doctor until the end of April. Had not been seen by a medical specialist or doctor for many years prior to that time. Had an MRI taken of his low back area in July of 2018 where they found some arthritic changes in lumbar spine area. Roughly 3 months ago - felt a pain in his low back while lifting a box, but pain has much improved since that time.  Prior Level of Function: No real history of falls or use of assistive device during daily movement   Pain: 0/10 - does not have any pain    Pain with coughing: no  Pain with sneezing: no  Pain with straining: no  Change in bowel/bladder: no  Numbness or tingling in groin area: no      Community Support/Living Environment/Employment History: Retired -      Patient/Family Goal: Improve strength and balacne    Fall Screen:   Have you fallen 2 or more times in the last year? No  Have you fallen and had an injury in the last year? No  Timed up & go:   Is patient a fall risk? Yes    Past Medical History:   Past Medical History:   Diagnosis Date     Altered gait 4/27/2018     Altered mental status - since approx 1/18 - delayed response, change in mentation 4/27/2018     Benign essential hypertension 4/27/2018     Chronic bilateral low back pain with sciatica 4/27/2018     Chronic fatigue 4/27/2018     Obesity 4/27/2018     Slurred speech x  "3 months, CT neg ED [facility] 4/25/18 4/27/2018     Type 2 diabetes mellitus with complication, without long-term current use of insulin (H) 4/27/2018     Vision changes x 3 months as of 4/18 4/27/2018       Past Surgical History:  No past surgical history on file.    Medications:   Current Outpatient Prescriptions   Medication Sig     ASPIRIN PO Take 81 mg by mouth daily     atorvastatin (LIPITOR) 40 MG tablet TAKE 1 TABLET BY MOUTH ONCE DAILY     Cholecalciferol (VITAMIN D-3) 1000 units CAPS Take 1,000 Units by mouth     CINNAMON PO Take 1,000 mg by mouth     clopidogrel (PLAVIX) 75 MG tablet Take 1 tablet (75 mg) by mouth daily     Garlic 1000 MG CAPS      lisinopril (PRINIVIL/ZESTRIL) 40 MG tablet TAKE 1 TABLET BY MOUTH ONCE DAILY     metFORMIN (GLUCOPHAGE-XR) 500 MG 24 hr tablet Take 2 tablets (1,000 mg) by mouth 2 times daily (with meals)     metoprolol tartrate (LOPRESSOR) 50 MG tablet TAKE 1 TABLET BY MOUTH TWICE DAILY     tamsulosin (FLOMAX) 0.4 MG capsule Take 1 capsule (0.4 mg) by mouth daily     TURMERIC CURCUMIN PO Take 1,500 mg by mouth     No current facility-administered medications for this encounter.      Facility-Administered Medications Ordered in Other Encounters   Medication     iopamidol (ISOVUE-370) solution 75 mL         Musculoskeletal Findings:     Imaging: MRI take on lumbar spine 7/13/18:  IMPRESSION: Advanced facet joint degenerative changes in the lower  lumbar spine at L3-L4, L4-L5, and L5-S1.. At L4-L5 the combination of  facet joint degenerative changes nonspondylolytic spondylolisthesis  and ligamentum flavum hypertrophy results in some compression of both  L5 nerve roots in the lateral recesses. At L3-L4 ligamentum flavum  hypertrophy and a small synovial cyst compresses the right L4 nerve  root     HERSON SANTOS MD  Patient goals: \"Increase strength and improve balance.\"    OBJECTIVE  Observation: Appears to PT in no acute distress  Palpation: No tenderness with palpation to " lumbar spine area    Gait: Normal   Assistive devices: no assistive devices    Posture: Slightly stooped posture.  Sitting Posture: Fair  Standing Posture: Good      Neurological Assessment:  -Deferred secondary to no neurological symptoms    Range of Motion:  Active Lumbar Spine:    -WNLs active lumbar spine motion    Right Lower Extremity Range of Motion: within normal limits    Left Lower Extremity Range of Motion: within normal limits    Strength:  Right Lower Extremity Strength: 5/5 except 4/5 hip extension, 4/5 hip abduction  Left Lower Extremity Strength: 5/5 except 4/5 hip extension, 4/5 hip abduction    Special Tests:    Spine Special Tests:  Slump:    Left: -              Right: -  Straight Leg Raise:    Left: -      Right: -  Traction: -  Prone Knee Bend:     Left:  -     Right: -  Passive Intervertebral Accessory Movements:  To tenderness or pain reproduction with passive movement of lumbar spine vertebrate   Prone Instability Test:                      Outcome Measures:   Pittman balance scale - 50 out of 56 - low fall risk    Prognosis/Plan of Care: Good  Appropriate for Physical Therapy Intervention: Yes     GOALS:   Short-term goals:  To be achieved in 3 weeks:    Instruct in home program  1.) Patient will be independent with a short-term home exercise program.  2.) Patient will understand biomechanical stressors of the lumbar spine in order to make modifications to activities to reduce further risk of injury.  3.) Patient will demonstrate proper body/lifting mechanics with abdominal bracing without cueing.  4.) Patient will report a 25% or greater improvement in symptoms in order to allow for increased comfort with activities of daily living.       Long-term goals:  To be achieved in 6-8 weeks:    Self management of symptoms  Return to previous level of function  1.) Improve score on Pittman balance scale by 2 points or greater to allow increased stability with daily movement and ambulation  2.) Patient  will report a 50% or greater improvement in symptoms in order to allow for increased comfort with activities of daily living  3.) Improve strength score of hip extension and abduction to 5/5 to allow increased hip stability with daily movement and ambulation.    Discharge goals: Patient will be independent with home program for self-management of symptoms.      Planned Interventions: Therapeutic exercise, manual therapy, therapeutic activity, patient education    Patient presents today with signs and symptoms consistent of generalized LE weakness and balance deficit. Therapy today consisted of evaluation, patient education, and therapeutic exercise utilizing balance retraining interventions. Patient would benefit from continued PT sessions addressing overall pain and dysfunction with use of appropriate interventions.    Clinical Impressions:  Criteria for Skilled Therapeutic Intervention Met: Yes  PT Diagnosis: LE weakness and balance deficit  Influenced by the following impairments: weakness, poor dynamic balance  Functional limitations due to impairment: unable to perform heavier lifting or activities that stress balance  Clinical presentation: Stable/Uncomplicated  Clinical presentation rationale: Patient presentation  Clinical Decision making (complexity): Moderate Complexity  Predicted Duration of Therapy Intervention (days/wks): 8 weeks  Risks and Benefits of therapy have been explained: Yes  Patient, Family & other staff in agreement with plan of care: Yes  Comments:       Total Evaluation Time: 45 minutes     G-Codes (Eval Only Medicare/[employer])   Mobility: Walking & Moving Around (, , )

## 2018-10-05 ENCOUNTER — TELEPHONE (OUTPATIENT)
Dept: NEUROLOGY | Facility: CLINIC | Age: 68
End: 2018-10-05

## 2018-10-11 ENCOUNTER — HOSPITAL ENCOUNTER (OUTPATIENT)
Dept: PHYSICAL THERAPY | Facility: HOSPITAL | Age: 68
Setting detail: THERAPIES SERIES
End: 2018-10-11
Attending: INTERNAL MEDICINE
Payer: MEDICARE

## 2018-10-11 PROCEDURE — 97110 THERAPEUTIC EXERCISES: CPT | Mod: GP

## 2018-10-11 PROCEDURE — 97112 NEUROMUSCULAR REEDUCATION: CPT | Mod: GP

## 2018-10-18 ENCOUNTER — HOSPITAL ENCOUNTER (OUTPATIENT)
Dept: PHYSICAL THERAPY | Facility: HOSPITAL | Age: 68
Setting detail: THERAPIES SERIES
End: 2018-10-18
Attending: INTERNAL MEDICINE
Payer: MEDICARE

## 2018-10-18 PROCEDURE — 97110 THERAPEUTIC EXERCISES: CPT | Mod: GP

## 2018-10-18 PROCEDURE — 97112 NEUROMUSCULAR REEDUCATION: CPT | Mod: GP

## 2018-10-22 DIAGNOSIS — E78.5 HYPERLIPIDEMIA LDL GOAL <100: ICD-10-CM

## 2018-10-22 RX ORDER — ATORVASTATIN CALCIUM 40 MG/1
40 TABLET, FILM COATED ORAL DAILY
Qty: 90 TABLET | Refills: 0 | Status: SHIPPED | OUTPATIENT
Start: 2018-10-22 | End: 2019-01-21

## 2018-10-25 ENCOUNTER — HOSPITAL ENCOUNTER (OUTPATIENT)
Dept: PHYSICAL THERAPY | Facility: HOSPITAL | Age: 68
Setting detail: THERAPIES SERIES
End: 2018-10-25
Attending: INTERNAL MEDICINE
Payer: MEDICARE

## 2018-10-25 PROCEDURE — 97110 THERAPEUTIC EXERCISES: CPT | Mod: GP

## 2018-10-25 PROCEDURE — 97112 NEUROMUSCULAR REEDUCATION: CPT | Mod: GP

## 2018-11-01 ENCOUNTER — HOSPITAL ENCOUNTER (OUTPATIENT)
Dept: PHYSICAL THERAPY | Facility: HOSPITAL | Age: 68
Setting detail: THERAPIES SERIES
End: 2018-11-01
Attending: INTERNAL MEDICINE
Payer: COMMERCIAL

## 2018-11-01 PROCEDURE — 97110 THERAPEUTIC EXERCISES: CPT | Mod: GP

## 2018-11-01 PROCEDURE — 97112 NEUROMUSCULAR REEDUCATION: CPT | Mod: GP

## 2018-11-01 PROCEDURE — 40000718 ZZHC STATISTIC PT DEPARTMENT ORTHO VISIT

## 2018-11-02 DIAGNOSIS — I10 BENIGN ESSENTIAL HYPERTENSION: ICD-10-CM

## 2018-11-02 RX ORDER — LISINOPRIL 40 MG/1
40 TABLET ORAL DAILY
Qty: 90 TABLET | Refills: 0 | Status: SHIPPED | OUTPATIENT
Start: 2018-11-02 | End: 2019-08-28

## 2018-11-02 NOTE — TELEPHONE ENCOUNTER
lisinopril (PRINIVIL/ZESTRIL) 40 MG tablet  Last Written Prescription Date:  8/24/18  Last Fill Quantity: 30,   # refills: 3  Last Office Visit: 7/25/18  Future Office visit: /   Next 5 appointments (look out 90 days)     Nov 12, 2018  4:00 PM CST   (Arrive by 3:45 PM)   SHORT with Sreedhar Mendieta,    [facility] ([facility] )    [address] [id number] [phone]            Jan 16, 2019  4:00 PM CST   (Arrive by 3:45 PM)   Return Visit with Chas Armendariz,    [facility] ([facility] )    [address] [place] [address]   [phone]

## 2018-11-12 ENCOUNTER — OFFICE VISIT (OUTPATIENT)
Dept: INTERNAL MEDICINE | Facility: OTHER | Age: 68
End: 2018-11-12
Attending: INTERNAL MEDICINE
Payer: COMMERCIAL

## 2018-11-12 VITALS
HEART RATE: 67 BPM | HEIGHT: 74 IN | TEMPERATURE: 92.6 F | DIASTOLIC BLOOD PRESSURE: 86 MMHG | SYSTOLIC BLOOD PRESSURE: 122 MMHG | BODY MASS INDEX: 33.37 KG/M2 | WEIGHT: 260 LBS | OXYGEN SATURATION: 99 %

## 2018-11-12 DIAGNOSIS — I42.2 HYPERTROPHIC CARDIOMYOPATHY (H): Primary | ICD-10-CM

## 2018-11-12 DIAGNOSIS — Z12.11 SPECIAL SCREENING FOR MALIGNANT NEOPLASMS, COLON: ICD-10-CM

## 2018-11-12 DIAGNOSIS — E11.8 TYPE 2 DIABETES MELLITUS WITH COMPLICATION, WITHOUT LONG-TERM CURRENT USE OF INSULIN (H): ICD-10-CM

## 2018-11-12 DIAGNOSIS — Z12.5 SCREENING FOR PROSTATE CANCER: ICD-10-CM

## 2018-11-12 DIAGNOSIS — I10 BENIGN ESSENTIAL HYPERTENSION: ICD-10-CM

## 2018-11-12 DIAGNOSIS — Z86.73 HISTORY OF CVA (CEREBROVASCULAR ACCIDENT): ICD-10-CM

## 2018-11-12 DIAGNOSIS — E78.5 HYPERLIPIDEMIA LDL GOAL <100: ICD-10-CM

## 2018-11-12 DIAGNOSIS — Z11.59 ENCOUNTER FOR HCV SCREENING TEST FOR LOW RISK PATIENT: ICD-10-CM

## 2018-11-12 DIAGNOSIS — I71.21 ASCENDING AORTIC ANEURYSM (H): ICD-10-CM

## 2018-11-12 PROCEDURE — G0472 HEP C SCREEN HIGH RISK/OTHER: HCPCS | Mod: ZL | Performed by: INTERNAL MEDICINE

## 2018-11-12 PROCEDURE — G0463 HOSPITAL OUTPT CLINIC VISIT: HCPCS

## 2018-11-12 PROCEDURE — G0103 PSA SCREENING: HCPCS | Mod: ZL | Performed by: INTERNAL MEDICINE

## 2018-11-12 PROCEDURE — 99214 OFFICE O/P EST MOD 30 MIN: CPT | Performed by: INTERNAL MEDICINE

## 2018-11-12 PROCEDURE — 40000788 ZZHCL STATISTIC ESTIMATED AVERAGE GLUCOSE: Mod: ZL | Performed by: INTERNAL MEDICINE

## 2018-11-12 PROCEDURE — 80053 COMPREHEN METABOLIC PANEL: CPT | Mod: ZL | Performed by: INTERNAL MEDICINE

## 2018-11-12 PROCEDURE — 83036 HEMOGLOBIN GLYCOSYLATED A1C: CPT | Mod: ZL | Performed by: INTERNAL MEDICINE

## 2018-11-12 PROCEDURE — 99000 SPECIMEN HANDLING OFFICE-LAB: CPT | Performed by: INTERNAL MEDICINE

## 2018-11-12 PROCEDURE — 80061 LIPID PANEL: CPT | Mod: ZL | Performed by: INTERNAL MEDICINE

## 2018-11-12 PROCEDURE — 36415 COLL VENOUS BLD VENIPUNCTURE: CPT | Mod: ZL | Performed by: INTERNAL MEDICINE

## 2018-11-12 ASSESSMENT — ANXIETY QUESTIONNAIRES
3. WORRYING TOO MUCH ABOUT DIFFERENT THINGS: NOT AT ALL
5. BEING SO RESTLESS THAT IT IS HARD TO SIT STILL: NOT AT ALL
4. TROUBLE RELAXING: NOT AT ALL
1. FEELING NERVOUS, ANXIOUS, OR ON EDGE: NOT AT ALL
2. NOT BEING ABLE TO STOP OR CONTROL WORRYING: NOT AT ALL
7. FEELING AFRAID AS IF SOMETHING AWFUL MIGHT HAPPEN: NOT AT ALL
GAD7 TOTAL SCORE: 0
6. BECOMING EASILY ANNOYED OR IRRITABLE: NOT AT ALL

## 2018-11-12 ASSESSMENT — PAIN SCALES - GENERAL: PAINLEVEL: NO PAIN (0)

## 2018-11-12 ASSESSMENT — PATIENT HEALTH QUESTIONNAIRE - PHQ9: SUM OF ALL RESPONSES TO PHQ QUESTIONS 1-9: 0

## 2018-11-12 NOTE — PROGRESS NOTES
Internal Medicine:    Chief Complaint   Patient presents with     RECHECK     Mendoza is a 68 year old male with a history of CVA, HTN, Hyperlipidemia, Type 2 DM, HCM and ascending aortic aneurysm all diagnosed in the last 6 months.  Mendoza presents today for follow up.  He is scheduled for repeat Echo in the near future.  He denies any chest pain or SOB.  He does report cheating a bit more recently with his diet.  No falls are reported.           Patient Active Problem List   Diagnosis     Benign essential hypertension     Obesity     Type 2 diabetes mellitus with complication, without long-term current use of insulin (H)     Chronic fatigue     Slurred speech x 3 months, CT neg ED [facility] 4/25/18     Vision changes x 3 months as of 4/18     Chronic bilateral low back pain with sciatica     Altered gait     Altered mental status - since approx 1/18 - delayed response, change in mentation     Hypertrophic cardiomyopathy at 2.0 cm     Ascending aortic aneurysm at 4.4 cm on echo from 6/20/18     SOB (shortness of breath)     Right ventricular enlargement     Chronic kidney disease, stage 3 (moderate) (H)     Encounter for abdominal aortic aneurysm screening     History of tobacco abuse     H/O ETOH abuse     Tobacco abuse counseling     History of CVA (cerebrovascular accident)            Past Medical History:   Diagnosis Date     Altered gait 4/27/2018     Altered mental status - since approx 1/18 - delayed response, change in mentation 4/27/2018     Benign essential hypertension 4/27/2018     Chronic bilateral low back pain with sciatica 4/27/2018     Chronic fatigue 4/27/2018     Obesity 4/27/2018     Slurred speech x 3 months, CT neg ED [facility] 4/25/18 4/27/2018     Type 2 diabetes mellitus with complication, without long-term current use of insulin (H) 4/27/2018     Vision changes x 3 months as of 4/18 4/27/2018          History reviewed. No pertinent surgical history.         Social History   Substance Use Topics  "    Smoking status: Former Smoker     Packs/day: 0.50     Types: Cigars     Start date: 1/1/2003     Quit date: 6/14/2018     Smokeless tobacco: Never Used     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             Family History   Problem Relation Age of Onset     Colon Cancer Mother      Kidney Cancer Father      KIDNEY DISEASE Father      Coronary Artery Disease Brother              No Known Allergies         Current Outpatient Prescriptions   Medication Sig Dispense Refill     ASPIRIN PO Take 81 mg by mouth daily       atorvastatin (LIPITOR) 40 MG tablet Take 1 tablet (40 mg) by mouth daily 90 tablet 0     Cholecalciferol (VITAMIN D-3) 1000 units CAPS Take 1,000 Units by mouth       CINNAMON PO Take 1,000 mg by mouth       Garlic 1000 MG CAPS        lisinopril (PRINIVIL/ZESTRIL) 40 MG tablet Take 1 tablet (40 mg) by mouth daily 90 tablet 0     metFORMIN (GLUCOPHAGE-XR) 500 MG 24 hr tablet Take 2 tablets (1,000 mg) by mouth 2 times daily (with meals) 360 tablet 1     metoprolol tartrate (LOPRESSOR) 50 MG tablet TAKE 1 TABLET BY MOUTH TWICE DAILY 60 tablet 8     tamsulosin (FLOMAX) 0.4 MG capsule Take 1 capsule (0.4 mg) by mouth daily 90 capsule 3     TURMERIC CURCUMIN PO Take 1,500 mg by mouth         Review Of Systems:    Respiratory: No shortness of breath, dyspnea on exertion, cough, or hemoptysis  Cardiovascular: negative  Gastrointestinal: negative  Genitourinary: negative  Musculoskeletal: back pain  Neurologic: negative  Psychiatric: negative  Hematologic/Lymphatic/Immunologic: negative  Endocrine: diabetes    Objective:   /86  Pulse 67  Temp 92.6  F (33.7  C) (Tympanic)  Ht 6' 2\" (1.88 m)  Wt 260 lb (117.9 kg)  SpO2 99%  BMI 33.38 kg/m2  EXAM:  Constitutional: alert and no distress   Cardiovascular:  RRR. No murmurs, clicks gallops or rub  Respiratory: Lungs clear  Psychiatric: mentation appears normal and affect normal/bright  NEURO: Gait normal. Reflexes normal and symmetric. Sensation " grossly WNL.  SKIN: no suspicious lesions or rashes       Orders placed or performed in visit on 11/02/18     lisinopril (PRINIVIL/ZESTRIL) 40 MG tablet       Assessment and Plan:    (I42.2) Hypertrophic cardiomyopathy at 2.0 cm  (primary encounter diagnosis)  Comment: Normal EF  Plan: Repeat echo    (I71.2) Ascending aortic aneurysm at 4.4 cm on echo from 6/20/18  Comment: Repeat Echo next month   Plan: Echo      (I10) Benign essential hypertension  Comment: At goal   Plan: No changes today.      (Z86.73) History of CVA (cerebrovascular accident)  Comment: MRI reviewed.    Plan: Observe and continue aggressive medical therapy.      (E11.8) Type 2 diabetes mellitus with complication, without long-term current use of insulin (H)  Comment:   Plan: Hemoglobin A1c, Comprehensive metabolic panel         (BMP + Alb, Alk Phos, ALT, AST, Total. Bili,         TP)    (Z11.59) Encounter for HCV screening test for low risk patient  Comment:   Plan: Hepatitis C Screen Reflex to HCV RNA Quant and         Genotype    (Z12.5) Screening for prostate cancer  Comment:   Plan: PSA, screen    (E78.5) Hyperlipidemia LDL goal <100  Comment:   Plan: Lipid Profile (Chol, Trig, HDL, LDL calc)    Colon cancer screening  Plan:  Daniel Mendieta,

## 2018-11-12 NOTE — MR AVS SNAPSHOT
After Visit Summary   11/12/2018    Deon Culver    MRN: [id number]           Patient Information     Date Of Birth          1950        Visit Information        Provider Department      11/12/2018 4:00 PM Sreedhar Mendieta,  [facility]        Today's Diagnoses     Hypertrophic cardiomyopathy at 2.0 cm    -  1    Ascending aortic aneurysm at 4.4 cm on echo from 6/20/18        Benign essential hypertension        History of CVA (cerebrovascular accident)        Type 2 diabetes mellitus with complication, without long-term current use of insulin (H)        Encounter for HCV screening test for low risk patient        Screening for prostate cancer        Hyperlipidemia LDL goal <100           Follow-ups after your visit        Your next 10 appointments already scheduled     Jan 16, 2019  4:00 PM CST   (Arrive by 3:45 PM)   [facility]    [address] [place] [address]   [phone]            Mar 13, 2019  2:00 PM CDT   (Arrive by 1:45 PM)   Return Visit with Jean-Claude Hopkins MD   [facility] ([facility])    [address] [place] [id number] [phone]              Who to contact     If you have questions or need follow up information about today's clinic visit or your schedule please contact [facility] directly at [phone].  Normal or non-critical lab and imaging results will be communicated to you by MyChart, letter or phone within 4 business days after the clinic has received the results. If you do not hear from us within 7 days, please contact the clinic through MyChart or phone. If you have a critical or abnormal lab result, we will notify you by phone as soon as possible.  Submit refill requests through [employer] or call your pharmacy and they will forward the refill  "request to us. Please allow 3 business days for your refill to be completed.          Additional Information About Your Visit        Frogramshart Information     [email or web address] gives you secure access to your electronic health record. If you see a primary care provider, you can also send messages to your care team and make appointments. If you have questions, please call your primary care clinic.  If you do not have a primary care provider, please call [phone] and they will assist you.        Care EveryWhere ID     This is your Care EveryWhere ID. This could be used by other organizations to access your [facility] medical records  [id number]        Your Vitals Were     Pulse Temperature Height Pulse Oximetry BMI (Body Mass Index)       67 92.6  F (33.7  C) (Tympanic) 6' 2\" (1.88 m) 99% 33.38 kg/m2        Blood Pressure from Last 3 Encounters:   11/12/18 122/86   09/12/18 148/88   07/25/18 110/78    Weight from Last 3 Encounters:   11/12/18 260 lb (117.9 kg)   09/12/18 263 lb 8 oz (119.5 kg)   07/25/18 260 lb (117.9 kg)              We Performed the Following     Comprehensive metabolic panel (BMP + Alb, Alk Phos, ALT, AST, Total. Bili, TP)     Hemoglobin A1c     Hepatitis C Screen Reflex to HCV RNA Quant and Genotype     Lipid Profile (Chol, Trig, HDL, LDL calc)     PSA, screen          Today's Medication Changes          These changes are accurate as of 11/12/18  4:06 PM.  If you have any questions, ask your nurse or doctor.               Stop taking these medicines if you haven't already. Please contact your care team if you have questions.     clopidogrel 75 MG tablet   Commonly known as:  PLAVIX   Stopped by:  Sreedhar Mendieta DO                    Primary Care Provider Office Phone # Fax #    Sreedhar Mendieta [phone]       [address] [place] [address]        Equal Access to Services     ELVIA NAVA AH: Dejan Lacey, cherelle bates, qaybta lyndsay " fidel birchshruthi joannamayra mujicaaan ah. So [facility] [phone].    ATENCIÓN: Si habla gaurang, tiene a rodriguez disposición servicios gratuitos de asistencia lingüística. Theodora al [phone].    We comply with applicable federal civil rights laws and [place] laws. We do not discriminate on the basis of race, color, national origin, age, disability, sex, sexual orientation, or gender identity.            Thank you!     Thank you for choosing [facility]  for your care. Our goal is always to provide you with excellent care. Hearing back from our patients is one way we can continue to improve our services. Please take a few minutes to complete the written survey that you may receive in the mail after your visit with us. Thank you!             Your Updated Medication List - Protect others around you: Learn how to safely use, store and throw away your medicines at www.disposemymeds.org.          This list is accurate as of 11/12/18  4:06 PM.  Always use your most recent med list.                   Brand Name Dispense Instructions for use Diagnosis    ASPIRIN PO      Take 81 mg by mouth daily        atorvastatin 40 MG tablet    LIPITOR    90 tablet    Take 1 tablet (40 mg) by mouth daily    Hyperlipidemia LDL goal <100       CINNAMON PO      Take 1,000 mg by mouth        Garlic 1000 MG Caps           lisinopril 40 MG tablet    PRINIVIL/ZESTRIL    90 tablet    Take 1 tablet (40 mg) by mouth daily    Benign essential hypertension       metFORMIN 500 MG 24 hr tablet    GLUCOPHAGE-XR    360 tablet    Take 2 tablets (1,000 mg) by mouth 2 times daily (with meals)    Type 2 diabetes mellitus with complication, without long-term current use of insulin (H)       metoprolol tartrate 50 MG tablet    LOPRESSOR    60 tablet    TAKE 1 TABLET BY MOUTH TWICE DAILY    Benign essential hypertension       tamsulosin 0.4 MG capsule    FLOMAX    90 capsule    Take 1 capsule (0.4 mg) by mouth daily    Benign  prostatic hyperplasia, unspecified whether lower urinary tract symptoms present       TURMERIC CURCUMIN PO      Take 1,500 mg by mouth        Vitamin D-3 1000 units Caps      Take 1,000 Units by mouth

## 2018-11-12 NOTE — NURSING NOTE
"Chief Complaint   Patient presents with     RECHECK       Initial /86  Pulse 67  Temp 92.6  F (33.7  C) (Tympanic)  Ht 6' 2\" (1.88 m)  Wt 260 lb (117.9 kg)  SpO2 99%  BMI 33.38 kg/m2 Estimated body mass index is 33.38 kg/(m^2) as calculated from the following:    Height as of this encounter: 6' 2\" (1.88 m).    Weight as of this encounter: 260 lb (117.9 kg).  Medication Reconciliation: complete    Ninoska Red LPN    "

## 2018-11-13 DIAGNOSIS — I10 BENIGN ESSENTIAL HYPERTENSION: ICD-10-CM

## 2018-11-13 DIAGNOSIS — I10 BENIGN ESSENTIAL HYPERTENSION: Primary | ICD-10-CM

## 2018-11-13 DIAGNOSIS — N18.2 CKD (CHRONIC KIDNEY DISEASE) STAGE 2, GFR 60-89 ML/MIN: ICD-10-CM

## 2018-11-13 DIAGNOSIS — E11.8 TYPE 2 DIABETES WITH COMPLICATION (H): ICD-10-CM

## 2018-11-13 DIAGNOSIS — E11.8 TYPE 2 DIABETES MELLITUS WITH COMPLICATION, WITHOUT LONG-TERM CURRENT USE OF INSULIN (H): ICD-10-CM

## 2018-11-13 DIAGNOSIS — N18.2 CKD (CHRONIC KIDNEY DISEASE) STAGE 2, GFR 60-89 ML/MIN: Primary | ICD-10-CM

## 2018-11-13 LAB
ALBUMIN SERPL-MCNC: 4.5 G/DL (ref 3.4–5)
ALP SERPL-CCNC: 70 U/L (ref 40–150)
ALT SERPL W P-5'-P-CCNC: 26 U/L (ref 0–70)
ANION GAP SERPL CALCULATED.3IONS-SCNC: 11 MMOL/L (ref 3–14)
AST SERPL W P-5'-P-CCNC: 13 U/L (ref 0–45)
BILIRUB SERPL-MCNC: 0.7 MG/DL (ref 0.2–1.3)
BUN SERPL-MCNC: 37 MG/DL (ref 7–30)
CALCIUM SERPL-MCNC: 9.4 MG/DL (ref 8.5–10.1)
CHLORIDE SERPL-SCNC: 100 MMOL/L (ref 94–109)
CHOLEST SERPL-MCNC: 114 MG/DL
CO2 SERPL-SCNC: 22 MMOL/L (ref 20–32)
CREAT SERPL-MCNC: 1.54 MG/DL (ref 0.66–1.25)
EST. AVERAGE GLUCOSE BLD GHB EST-MCNC: 117 MG/DL
GFR SERPL CREATININE-BSD FRML MDRD: 45 ML/MIN/1.7M2
GLUCOSE SERPL-MCNC: 101 MG/DL (ref 70–99)
HBA1C MFR BLD: 5.7 % (ref 0–5.6)
HDLC SERPL-MCNC: 53 MG/DL
LDLC SERPL CALC-MCNC: 38 MG/DL
NONHDLC SERPL-MCNC: 61 MG/DL
POTASSIUM SERPL-SCNC: 4.4 MMOL/L (ref 3.4–5.3)
PROT SERPL-MCNC: 7.8 G/DL (ref 6.8–8.8)
PSA SERPL-ACNC: 0.8 UG/L (ref 0–4)
SODIUM SERPL-SCNC: 133 MMOL/L (ref 133–144)
TRIGL SERPL-MCNC: 113 MG/DL

## 2018-11-13 ASSESSMENT — ANXIETY QUESTIONNAIRES: GAD7 TOTAL SCORE: 0

## 2018-11-14 ENCOUNTER — TELEPHONE (OUTPATIENT)
Dept: INTERNAL MEDICINE | Facility: OTHER | Age: 68
End: 2018-11-14

## 2018-11-14 LAB — HCV AB SERPL QL IA: NONREACTIVE

## 2018-11-14 NOTE — TELEPHONE ENCOUNTER
Pt requested script for stationary bike be mailed to him.    Pamela M Lechevalier LPN  Mailed out on 11/14/18

## 2018-11-29 DIAGNOSIS — E11.8 TYPE 2 DIABETES MELLITUS WITH COMPLICATION, WITHOUT LONG-TERM CURRENT USE OF INSULIN (H): ICD-10-CM

## 2018-11-29 NOTE — TELEPHONE ENCOUNTER
Metformin ER  Last Written Prescription Date:  6/25/18  Last Fill Quantity: 60,   # refills: 8  Last Office Visit: 11/12/18  Future Office visit:    Next 5 appointments (look out 90 days)     Jan 16, 2019  4:00 PM CST   (Arrive by 3:45 PM)   Return Visit with Chas Armendariz DO   [facility] [place] [facility] ([facility] [place] [facility] )    [address] [place] [address]   [phone]

## 2018-11-30 ENCOUNTER — MYC MEDICAL ADVICE (OUTPATIENT)
Dept: CARDIOLOGY | Facility: OTHER | Age: 68
End: 2018-11-30

## 2018-11-30 RX ORDER — METFORMIN HCL 500 MG
1000 TABLET, EXTENDED RELEASE 24 HR ORAL 2 TIMES DAILY WITH MEALS
Qty: 360 TABLET | Refills: 1 | Status: SHIPPED | OUTPATIENT
Start: 2018-11-30 | End: 2019-05-20

## 2018-12-03 NOTE — TELEPHONE ENCOUNTER
Call placed to [facility] in Diagnostic Imaging and she states patient was scheduled for his echocardiogram.  US appointment scheduled in [facility] for 1/7/2019 and follow-up with Dr. Armendariz on 1/16/2019

## 2019-01-03 ENCOUNTER — TRANSFERRED RECORDS (OUTPATIENT)
Dept: HEALTH INFORMATION MANAGEMENT | Facility: CLINIC | Age: 69
End: 2019-01-03

## 2019-01-03 LAB — COLOGUARD-ABSTRACT: NEGATIVE

## 2019-01-07 ENCOUNTER — HOSPITAL ENCOUNTER (OUTPATIENT)
Dept: CARDIOLOGY | Facility: HOSPITAL | Age: 69
Discharge: HOME | End: 2019-01-07
Attending: INTERNAL MEDICINE | Admitting: INTERNAL MEDICINE
Payer: COMMERCIAL

## 2019-01-07 ENCOUNTER — TELEPHONE (OUTPATIENT)
Dept: INTERNAL MEDICINE | Facility: OTHER | Age: 69
End: 2019-01-07

## 2019-01-07 DIAGNOSIS — R06.02 SOB (SHORTNESS OF BREATH): ICD-10-CM

## 2019-01-07 DIAGNOSIS — I42.2 HYPERTROPHIC CARDIOMYOPATHY (H): ICD-10-CM

## 2019-01-07 DIAGNOSIS — I71.21 ASCENDING AORTIC ANEURYSM (H): ICD-10-CM

## 2019-01-07 DIAGNOSIS — I10 BENIGN ESSENTIAL HYPERTENSION: ICD-10-CM

## 2019-01-07 PROCEDURE — 93306 TTE W/DOPPLER COMPLETE: CPT | Mod: TC

## 2019-01-07 PROCEDURE — 93306 TTE W/DOPPLER COMPLETE: CPT | Mod: 26 | Performed by: INTERNAL MEDICINE

## 2019-01-09 ENCOUNTER — TELEPHONE (OUTPATIENT)
Dept: INTERNAL MEDICINE | Facility: OTHER | Age: 69
End: 2019-01-09

## 2019-01-16 ENCOUNTER — OFFICE VISIT (OUTPATIENT)
Dept: CARDIOLOGY | Facility: OTHER | Age: 69
End: 2019-01-16
Attending: INTERNAL MEDICINE
Payer: COMMERCIAL

## 2019-01-16 VITALS
HEART RATE: 61 BPM | RESPIRATION RATE: 16 BRPM | HEIGHT: 74 IN | DIASTOLIC BLOOD PRESSURE: 93 MMHG | OXYGEN SATURATION: 99 % | SYSTOLIC BLOOD PRESSURE: 165 MMHG | WEIGHT: 272 LBS | BODY MASS INDEX: 34.91 KG/M2 | TEMPERATURE: 96.3 F

## 2019-01-16 DIAGNOSIS — I10 BENIGN ESSENTIAL HYPERTENSION: ICD-10-CM

## 2019-01-16 DIAGNOSIS — I42.2 HYPERTROPHIC CARDIOMYOPATHY (H): ICD-10-CM

## 2019-01-16 DIAGNOSIS — Z71.6 TOBACCO ABUSE COUNSELING: ICD-10-CM

## 2019-01-16 DIAGNOSIS — I71.21 ASCENDING AORTIC ANEURYSM (H): ICD-10-CM

## 2019-01-16 DIAGNOSIS — N18.30 CHRONIC KIDNEY DISEASE, STAGE 3 (MODERATE): ICD-10-CM

## 2019-01-16 DIAGNOSIS — E11.8 TYPE 2 DIABETES MELLITUS WITH COMPLICATION, WITHOUT LONG-TERM CURRENT USE OF INSULIN (H): ICD-10-CM

## 2019-01-16 DIAGNOSIS — Z72.0 TOBACCO ABUSE: Primary | ICD-10-CM

## 2019-01-16 DIAGNOSIS — I51.89 DIASTOLIC DYSFUNCTION: ICD-10-CM

## 2019-01-16 DIAGNOSIS — Z86.73 HISTORY OF CVA (CEREBROVASCULAR ACCIDENT): ICD-10-CM

## 2019-01-16 PROCEDURE — 99214 OFFICE O/P EST MOD 30 MIN: CPT | Performed by: INTERNAL MEDICINE

## 2019-01-16 PROCEDURE — G0463 HOSPITAL OUTPT CLINIC VISIT: HCPCS

## 2019-01-16 ASSESSMENT — PAIN SCALES - GENERAL: PAINLEVEL: NO PAIN (0)

## 2019-01-16 ASSESSMENT — MIFFLIN-ST. JEOR: SCORE: 2078.53

## 2019-01-16 NOTE — PROGRESS NOTES
Cardiology Progress Note     Assessment & Plan   Deon Culver is a 68 year old male who is being seen in follow-up to a visit from 7/6/18.  He is known to have HCM with a septal thickness of 2.2 cm on 1/7/19, an ascending aortic aneurysm at 44.5 mm, and diastolic dysfunction grade 1. He is here for 6-month echocardiogram follow-up relative to 6/20/18.  He previously had an echo on 6/20/18 with a septal thickness of 2.0 cm and an ascending aortic aneurysm of 4.4 cm.  On 1/7/19 his septum was 2.2 cm, had grade 1 diastolic dysfunction, and ascending aortic aneurysm at 44.5 cm.  He has a history of a CVA with ongoing unsteadiness of gait but no dizziness in March, 2018. He is to be a heavy smoker smoking up to 7-8  cigars on a daily basis. He reports he quit smoking in April, 2018. He has hypertension with a blood pressure of 171/91 today.  At home, his blood pressure is running in the 120's/80's regularly.  He is currently on lisinopril 40 mg daily and metoprolol 50 mg twice a day.  He has not had any lower extremity edema or chest pain.  With his history of diastolic dysfunction, it has been suggested he watch his salt intake, fluid intake, and weigh himself on a daily basis.    Impression:  1.  Ascending aortic aneurysm at 4.45 cm on 1/7/19.  2.  HCM a 2.2 cm and 1/7/19.  3.  History of tobacco abuse smoking cigars quitting in April, 2018.  He smoked approximate 7 days cigars on a daily basis.  4.  Diastolic dysfunction grade 1 on 1/7/19.  5.  Hypertension.  6.  Diabetes mellitus type 2.  7.  Chronic kidney disease stage III.  8.  History of CVA in March, 2018 with what appears to be unsteady gait.    Plan:  1.  He is to watch his salt intake, fluid intake, and weigh himself on a daily basis related to diastolic dysfunction grade 1 on 1/7/19.  2.  He will have a repeat echocardiogram in 1 year to assess diastolic dysfunction, an ascending aortic aneurysm at 44.5 mm, and septal thickness of 2.2 cm.  3.  With his  history of tobacco abuse, he was encouraged to refrain from smoking.  4.  He will be seen in approximately 1 year follow-up after completion of echocardiogram as described as above.    Chas Armendariz        Physical Exam   Temp: 96.3  F (35.7  C) Temp src: Tympanic BP: (!) 165/93 Pulse: 61   Resp: 16 SpO2: 99 %      Vitals:    01/16/19 1601   Weight: 123.4 kg (272 lb)     Vital Signs with Ranges  Temp:  [96.3  F (35.7  C)] 96.3  F (35.7  C)  Pulse:  [52-61] 61  Resp:  [16] 16  BP: (165-171)/(91-93) 165/93  SpO2:  [99 %] 99 %  ROS negative except that which was noted in the HPI.    Peripheral IV 06/20/18 Right Upper forearm (Active)   Number of days: 210       Constitutional: awake, alert, cooperative, no apparent distress, and appears stated age  Eyes: Lids and lashes normal, pupils equal, sclera clear, conjunctiva normal  ENT: Normocephalic, without obvious abnormality, atraumatic.  Respiratory: No increased work of breathing, good air exchange, clear to auscultation bilaterally, no crackles or wheezing  Cardiovascular: Normal apical impulse, regular rate and rhythm, normal S1 and S2, no S3 or S4, and no murmur noted  Musculoskeletal: no lower extremity pitting edema present  Neurologic: Awake, alert, oriented to name, place and time.   Neuropsychiatric: General: normal, calm and normal eye contact    Medications         Data   No results found for this or any previous visit (from the past 24 hour(s)).  No results found for this or any previous visit (from the past 24 hour(s)).

## 2019-01-16 NOTE — NURSING NOTE
"Chief Complaint   Patient presents with     RECHECK     6 month cardiology follow-up       Initial BP (!) 171/91 (BP Location: Left arm, Patient Position: Chair, Cuff Size: Adult Large)   Pulse 52   Temp 96.3  F (35.7  C) (Tympanic)   Resp 16   Ht 1.88 m (6' 2\")   Wt 123.4 kg (272 lb)   SpO2 99%   BMI 34.92 kg/m   Estimated body mass index is 34.92 kg/m  as calculated from the following:    Height as of this encounter: 1.88 m (6' 2\").    Weight as of this encounter: 123.4 kg (272 lb).  Medication Reconciliation: complete    Catie Peña LPN    "

## 2019-01-16 NOTE — PATIENT INSTRUCTIONS
You were seen by Dr. Armendariz, 1/16/2019.     1.  Please monitor you weight daily.  If you experience a 2-3 pound increase in weight in  24 hours, or 5 pounds in one week. Please call the cardiology office as you may need your medications adjusted or you may need to be seen.       2. Please limit sodium to 2500 mg per day and fluid to less than 2 liters per day.  This will help to control the symptoms of heart failure related to the diastolic dysfunction.      3. You will have an echocardiogram performed.  This is an ultrasound of the heart, that evaluates heart function.  The hospital scheduling department will call to schedule you for this test. You will have this done in one year from now prior to your next appointment.     4. If you develop new or worsening symptoms please call the cardiology office as you may need to be seen sooner.       You will follow up with Dr. Armendariz in 1 year.       Please call the cardiology office with problems, questions, or concerns at [phone].    If you experience chest pain, chest pressure, chest tightness, shortness of breath, fainting, lightheadedness, nausea, vomiting, or other concerning symptoms, please report to the Emergency Department or call 911. These symptoms may be emergent, and best treated in the Emergency Department.       Cheyenne WORKMAN RN-BSN  Cardiology   [facility]  [phone]      HOW TO QUIT SMOKING  Smoking is one of the hardest habits to break. About half of all those who have ever smoked have been able to quit, and most of those (about 70%) who still smoke want to quit. Here are some of the best ways to stop smoking.     KEEP TRYING:  It takes most smokers about 8 tries before they are finally able to fully quit. So, the more often you try and fail, the better your chance of quitting the next time! So, don't give up!    GO COLD TURKEY:  Most ex-smokers quit cold turkey. Trying to cut back gradually doesn't seem to work as well, perhaps because  it continues the smoking habit. Also, it is possible to fool yourself by inhaling more while smoking fewer cigarettes. This results in the same amount of nicotine in your body!    GET SUPPORT:  Support programs can make an important difference, especially for the heavy smoker. These groups offer lectures, methods to change your behavior and peer support. Call the free national Quitline for more information. 800-QUIT-NOW (224-333-0630). Low-cost or free programs are offered by many hospitals, local chapters of the American Lung Association (978-467-7595) and the American Cancer Society (261-103-4344). Support at home is important too. Non-smokers can help by offering praise and encouragement. If the smoker fails to quit, encourage them to try again!    OVER-THE-COUNTER MEDICINES:  For those who can't quit on their own, Nicotine Replacement Therapy (NRT) may make quitting much easier. Certain aids such as the nicotine patch, gum and lozenge are available without a prescription. However, it is best to use these under the guidance of your doctor. The skin patch provides a steady supply of nicotine to the body. Nicotine gum and lozenge gives temporary bursts of low levels of nicotine. Both methods take the edge off the craving for cigarettes. WARNING: If you feel symptoms of nicotine overdose, such as nausea, vomiting, dizziness, weakness, or fast heartbeat, stop using these and see your doctor.    PRESCRIPTION MEDICINES:  After evaluating your smoking patterns and prior attempts at quitting, your doctor may offer a prescription medicine such as bupropion (Zyban, Wellbutrin), varenicline (Chantix, Champix), a niocotine inhaler or nasal spray. Each has its unique advantage and side effects which your doctor can review with you.    HEALTH BENEFITS OF QUITTING:  The benefits of quitting start right away and keep improving the longer you go without smokin minutes: blood pressure and pulse return to normal  8 hours:  oxygen levels return to normal  2 days: ability to smell and taste begins to improve as damaged nerves start to regrow  2-3 weeks: circulation and lung function improves  1-9 months: decreased cough, congestion and shortness of breath; less tired  1 year: risk of heart attack decreases by half  5 years: risk of lung cancer decreases by half; risk of stroke becomes the same as a non-smoker  For information about how to quit smoking, visit the following links:  National Cancer Roulette ,   Clearing the Air, Quit Smoking Today   - an online booklet. http://www.smokefree.gov/pubs/clearing_the_air.pdf  Smokefree.gov http://smokefree.gov/  QuitNet http://www.quitnet.com/    0908-8910 Faith Ordoñez, [address] [place] [address], [place] [address]. All rights reserved. This information is not intended as a substitute for professional medical care. Always follow your healthcare professional's instructions.    The Benefits of Living Smoke Free  What do you want to gain from quitting? Check off some reasons to quit.  Health Benefits  ___ Reduce my risk of lung cancer, heart disease, chronic lung disease  ___ Have fewer wrinkles and softer skin  ___ Improve my sense of taste and smell  ___ For pregnant women--reduce the risk of having a miscarriage, stillbirth, premature birth, or low-birth-weight baby  Personal Benefits  ___ Feel more in control of my life  ___ Have better-smelling hair, breath, clothes, home, and car  ___ Save time by not having to take smoke breaks, buy cigarettes, or hunt for a light  ___ Have whiter teeth  Family Benefits  ___ Reduce my children s respiratory tract infections  ___ Set a good example for my children  ___ Reduce my family s cancer risk  Financial Benefits  ___ Save hundreds of dollars each year that would be spent on cigarettes  ___ Save money on medical bills  ___ Save on life, health, and car insurance premiums    Those Dollars Add Up!  Cigarettes are expensive, and getting more expensive all  the time. Do you realize how much money you are spending on cigarettes per year? What is the average amount you spend on a pack of cigarettes? What is the average number of packs that you smoke per day? Using your answers to these questions, fill in this formula to help you find out:  ($ _____ per pack) ×  ( _____ number of packs per day) × (365 days) =  $ _____ yearly cost of smoking  Besides tobacco, there are other costs, including extra cleaning bills and replacement costs for clothing and furniture; medical expenses for smoking-related illnesses; and higher health, life, and car insurance premiums.    Cigars and Pipes Count Too!  Cigars and pipes are also dangerous. So are smokeless (chewing) tobacco and snuff. All of these products contain nicotine, a highly addictive substance that has harmful effects on your body. Quitting smoking means giving up all tobacco products.      7895-[address]250 [address], [place] [address]. All rights reserved. This information is not intended as a substitute for professional medical care. Always follow your healthcare professional's instructions.

## 2019-02-26 DIAGNOSIS — I10 BENIGN ESSENTIAL HYPERTENSION: ICD-10-CM

## 2019-02-28 RX ORDER — LISINOPRIL 40 MG/1
TABLET ORAL
Qty: 30 TABLET | Refills: 0 | Status: SHIPPED | OUTPATIENT
Start: 2019-02-28 | End: 2019-03-19

## 2019-02-28 RX ORDER — METOPROLOL TARTRATE 50 MG
TABLET ORAL
Qty: 60 TABLET | Refills: 0 | Status: SHIPPED | OUTPATIENT
Start: 2019-02-28 | End: 2019-08-28

## 2019-02-28 NOTE — TELEPHONE ENCOUNTER
lisinopril (PRINIVIL/ZESTRIL) 40 MG tablet    Last refill date 11/2/18      Last office visit 11/12/18      metoprolol tartrate (LOPRESSOR) 50 MG tablet    Last refill date 6/25/18      Last office visit 11/12/18

## 2019-03-15 NOTE — PROGRESS NOTES
"  SUBJECTIVE:   Deon Culver is a 68 year old male who presents to clinic today for the following health issues:      Hypertension Follow-up      Outpatient blood pressures are being checked at home.  Results are 160's/ 90's.    Low Salt Diet: no added salt      Amount of exercise or physical activity: None    Problems taking medications regularly: No    Medication side effects: none    Diet: regular (no restrictions)        Balance issue      Duration: about 1 year    Description (location/character/radiation): feels his equilibrium is off - \"I feel wobbly sometimes when I walk\"     Intensity:  mild    Accompanying signs and symptoms: n/a    History (similar episodes/previous evaluation): PT- \"didn't do shit\"    Precipitating or alleviating factors: None    Therapies tried and outcome: PT- \"didn't do shit\"        Mendoza presents today for follow up of his HTN as he reports BPs at home recently have been in the 160s systolic.  He denies any chest pain or SOB.  He does admit to dietary indiscretion with 20-30# weight gain over the last several months.  Mendoza also reports on gait imbalance which has not improved despite completing PT.     Problem list and histories reviewed & adjusted, as indicated.  Additional history: as documented    Patient Active Problem List   Diagnosis     Benign essential hypertension     Obesity     Type 2 diabetes mellitus      Chronic fatigue     Slurred speech x 3 months, CT neg ED [facility] 4/25/18     Vision changes x 3 months as of 4/18     Chronic bilateral low back pain with sciatica     Altered gait     Altered mental status - since approx 1/18 - delayed response, change in mentation     Hypertrophic cardiomyopathy at 2.2 cm     Ascending aortic aneurysm at 4.45 cm on echo from 1/7/19     SOB (shortness of breath)     Right ventricular enlargement     Chronic kidney disease, stage 3 (moderate) (H)     History of tobacco abuse     H/O ETOH abuse     Tobacco abuse counseling     History " of CVA 3/18     Special screening for malignant neoplasms, colon     Tobacco abuse     Diastolic dysfunction grade 1 on 19     Obesity (BMI 35.0-39.9) with comorbidity (H)     History reviewed. No pertinent surgical history.    Social History     Tobacco Use     Smoking status: Current Some Day Smoker     Packs/day: 0.50     Types: Cigars     Start date: 2003     Last attempt to quit: 2018     Years since quittin.7     Smokeless tobacco: Never Used   Substance Use Topics     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      Family History   Problem Relation Age of Onset     Colon Cancer Mother      Kidney Cancer Father      Kidney Disease Father      Coronary Artery Disease Brother      Pulmonary Embolism Brother      Coronary Artery Disease Brother      Myocardial Infarction Brother          Current Outpatient Medications   Medication Sig Dispense Refill     amLODIPine (NORVASC) 5 MG tablet Take 1 tablet (5 mg) by mouth daily 30 tablet 3     ASPIRIN PO Take 81 mg by mouth daily       atorvastatin (LIPITOR) 40 MG tablet TAKE 1 TABLET BY MOUTH ONCE DAILY 90 tablet 1     Cholecalciferol (VITAMIN D-3) 1000 units CAPS Take 1,000 Units by mouth       CINNAMON PO Take 1,000 mg by mouth       Garlic 1000 MG CAPS        lisinopril (PRINIVIL/ZESTRIL) 40 MG tablet Take 1 tablet (40 mg) by mouth daily 90 tablet 0     metFORMIN (GLUCOPHAGE-XR) 500 MG 24 hr tablet Take 2 tablets (1,000 mg) by mouth 2 times daily (with meals) 360 tablet 1     metoprolol tartrate (LOPRESSOR) 50 MG tablet TAKE 1 TABLET BY MOUTH TWICE DAILY 60 tablet 0     order for DME Equipment being ordered: Stationary Bike 1 Device 0     tamsulosin (FLOMAX) 0.4 MG capsule Take 1 capsule (0.4 mg) by mouth daily 90 capsule 3     TURMERIC CURCUMIN PO Take 1,500 mg by mouth       No Known Allergies  Recent Labs   Lab Test 18  1608 18  1527 18  1426 18  1443  18  1048 18   A1C 5.7*  --  7.7*  --   --   --  10.5*    LDL 38  --   --   --   --  86  --    HDL 53  --   --   --   --  50  --    TRIG 113  --   --   --   --  196*  --    ALT 26 27  --  22   < >  --  23   CR 1.54* 1.53* 2.11* 1.96*   < >  --  1.05   GFRESTIMATED 45* 46* 31* 34*   < >  --   --    GFRESTBLACK 55* 55* 38* 41*   < >  --   --    POTASSIUM 4.4 4.8 5.1 4.6   < >  --  4.0   TSH  --   --   --   --   --  1.43  --     < > = values in this interval not displayed.      BP Readings from Last 3 Encounters:   03/19/19 130/84   01/16/19 (!) 165/93   11/12/18 122/86    Wt Readings from Last 3 Encounters:   03/19/19 127 kg (280 lb)   01/16/19 123.4 kg (272 lb)   11/12/18 117.9 kg (260 lb)                    Reviewed and updated as needed this visit by clinical staff       Reviewed and updated as needed this visit by Provider       ROS:  Constitutional, HEENT, cardiovascular, pulmonary, gi and gu systems are negative, except as otherwise noted.    OBJECTIVE:     /84   Pulse 65   Temp 95.4  F (35.2  C) (Tympanic)   Wt 127 kg (280 lb)   SpO2 99%   BMI 35.95 kg/m    Body mass index is 35.95 kg/m .   GENERAL: Alert and no distress  RESP: lungs clear to auscultation - no rales, rhonchi or wheezes  CV: regular rate and rhythm, normal S1 S2, no S3 or S4, no murmurs  MS: Trace LE edema    SKIN: no suspicious lesions or rashes  NEURO: Normal strength and tone, mentation intact and speech normal  PSYCH: mentation appears normal, affect normal/bright    Diagnostic Test Results:  Results for orders placed or performed in visit on 03/19/19 (from the past 24 hour(s))   CBC with platelets differential   Result Value Ref Range    WBC 6.4 4.0 - 11.0 10e9/L    RBC Count 4.10 (L) 4.4 - 5.9 10e12/L    Hemoglobin 13.4 13.3 - 17.7 g/dL    Hematocrit 38.7 (L) 40.0 - 53.0 %    MCV 94 78 - 100 fl    MCH 32.7 26.5 - 33.0 pg    MCHC 34.6 31.5 - 36.5 g/dL    RDW 12.3 10.0 - 15.0 %    Platelet Count 179 150 - 450 10e9/L    % Neutrophils 64.2 %    % Lymphocytes 24.1 %    % Monocytes 7.4 %     % Eosinophils 3.8 %    % Basophils 0.5 %    Absolute Neutrophil 4.1 1.6 - 8.3 10e9/L    Absolute Lymphocytes 1.5 0.8 - 5.3 10e9/L    Absolute Monocytes 0.5 0.0 - 1.3 10e9/L    Absolute Eosinophils 0.2 0.0 - 0.7 10e9/L    Absolute Basophils 0.0 0.0 - 0.2 10e9/L    Diff Method Automated Method      Results for orders placed or performed in visit on 03/19/19   CBC with platelets differential   Result Value Ref Range    WBC 6.4 4.0 - 11.0 10e9/L    RBC Count 4.10 (L) 4.4 - 5.9 10e12/L    Hemoglobin 13.4 13.3 - 17.7 g/dL    Hematocrit 38.7 (L) 40.0 - 53.0 %    MCV 94 78 - 100 fl    MCH 32.7 26.5 - 33.0 pg    MCHC 34.6 31.5 - 36.5 g/dL    RDW 12.3 10.0 - 15.0 %    Platelet Count 179 150 - 450 10e9/L    % Neutrophils 64.2 %    % Lymphocytes 24.1 %    % Monocytes 7.4 %    % Eosinophils 3.8 %    % Basophils 0.5 %    Absolute Neutrophil 4.1 1.6 - 8.3 10e9/L    Absolute Lymphocytes 1.5 0.8 - 5.3 10e9/L    Absolute Monocytes 0.5 0.0 - 1.3 10e9/L    Absolute Eosinophils 0.2 0.0 - 0.7 10e9/L    Absolute Basophils 0.0 0.0 - 0.2 10e9/L    Diff Method Automated Method        ASSESSMENT/PLAN:     Problem List Items Addressed This Visit     Hypertrophic cardiomyopathy at 2.2 cm    Chronic kidney disease, stage 3 (moderate) (H)    Obesity (BMI 35.0-39.9) with comorbidity (H)      Other Visit Diagnoses     Hypertension, unspecified type    -  Primary    Relevant Medications    amLODIPine (NORVASC) 5 MG tablet    Other Relevant Orders    Comprehensive metabolic panel (Completed)    Albumin Random Urine Quantitative with Creat Ratio    Type 2 diabetes mellitus with complication, unspecified whether long term insulin use (H)        Relevant Orders    CBC with platelets differential (Completed)    Hemoglobin A1c (Completed)    Albumin Random Urine Quantitative with Creat Ratio         Sreedhar Mendieta DO  [facility]

## 2019-03-19 ENCOUNTER — OFFICE VISIT (OUTPATIENT)
Dept: INTERNAL MEDICINE | Facility: OTHER | Age: 69
End: 2019-03-19
Attending: INTERNAL MEDICINE
Payer: COMMERCIAL

## 2019-03-19 VITALS
TEMPERATURE: 95.4 F | HEART RATE: 65 BPM | BODY MASS INDEX: 35.95 KG/M2 | SYSTOLIC BLOOD PRESSURE: 130 MMHG | WEIGHT: 280 LBS | DIASTOLIC BLOOD PRESSURE: 84 MMHG | OXYGEN SATURATION: 99 %

## 2019-03-19 DIAGNOSIS — E11.8 TYPE 2 DIABETES MELLITUS WITH COMPLICATION, UNSPECIFIED WHETHER LONG TERM INSULIN USE: ICD-10-CM

## 2019-03-19 DIAGNOSIS — I10 HYPERTENSION, UNSPECIFIED TYPE: Primary | ICD-10-CM

## 2019-03-19 DIAGNOSIS — E66.01 MORBID OBESITY (H): ICD-10-CM

## 2019-03-19 DIAGNOSIS — I42.2 HYPERTROPHIC CARDIOMYOPATHY (H): ICD-10-CM

## 2019-03-19 DIAGNOSIS — N18.30 CHRONIC KIDNEY DISEASE, STAGE 3 (MODERATE): ICD-10-CM

## 2019-03-19 LAB
ALBUMIN SERPL-MCNC: 4.1 G/DL (ref 3.4–5)
ALP SERPL-CCNC: 68 U/L (ref 40–150)
ALT SERPL W P-5'-P-CCNC: 21 U/L (ref 0–70)
ANION GAP SERPL CALCULATED.3IONS-SCNC: 10 MMOL/L (ref 3–14)
AST SERPL W P-5'-P-CCNC: 15 U/L (ref 0–45)
BASOPHILS # BLD AUTO: 0 10E9/L (ref 0–0.2)
BASOPHILS NFR BLD AUTO: 0.5 %
BILIRUB SERPL-MCNC: 0.5 MG/DL (ref 0.2–1.3)
BUN SERPL-MCNC: 34 MG/DL (ref 7–30)
CALCIUM SERPL-MCNC: 9.5 MG/DL (ref 8.5–10.1)
CHLORIDE SERPL-SCNC: 103 MMOL/L (ref 94–109)
CO2 SERPL-SCNC: 24 MMOL/L (ref 20–32)
CREAT SERPL-MCNC: 1.74 MG/DL (ref 0.66–1.25)
DIFFERENTIAL METHOD BLD: ABNORMAL
EOSINOPHIL # BLD AUTO: 0.2 10E9/L (ref 0–0.7)
EOSINOPHIL NFR BLD AUTO: 3.8 %
ERYTHROCYTE [DISTWIDTH] IN BLOOD BY AUTOMATED COUNT: 12.3 % (ref 10–15)
EST. AVERAGE GLUCOSE BLD GHB EST-MCNC: 126 MG/DL
GFR SERPL CREATININE-BSD FRML MDRD: 39 ML/MIN/{1.73_M2}
GLUCOSE SERPL-MCNC: 89 MG/DL (ref 70–99)
HBA1C MFR BLD: 6 % (ref 0–5.6)
HCT VFR BLD AUTO: 38.7 % (ref 40–53)
HGB BLD-MCNC: 13.4 G/DL (ref 13.3–17.7)
LYMPHOCYTES # BLD AUTO: 1.5 10E9/L (ref 0.8–5.3)
LYMPHOCYTES NFR BLD AUTO: 24.1 %
MCH RBC QN AUTO: 32.7 PG (ref 26.5–33)
MCHC RBC AUTO-ENTMCNC: 34.6 G/DL (ref 31.5–36.5)
MCV RBC AUTO: 94 FL (ref 78–100)
MONOCYTES # BLD AUTO: 0.5 10E9/L (ref 0–1.3)
MONOCYTES NFR BLD AUTO: 7.4 %
NEUTROPHILS # BLD AUTO: 4.1 10E9/L (ref 1.6–8.3)
NEUTROPHILS NFR BLD AUTO: 64.2 %
PLATELET # BLD AUTO: 179 10E9/L (ref 150–450)
POTASSIUM SERPL-SCNC: 4.6 MMOL/L (ref 3.4–5.3)
PROT SERPL-MCNC: 7.3 G/DL (ref 6.8–8.8)
RBC # BLD AUTO: 4.1 10E12/L (ref 4.4–5.9)
SODIUM SERPL-SCNC: 137 MMOL/L (ref 133–144)
WBC # BLD AUTO: 6.4 10E9/L (ref 4–11)

## 2019-03-19 PROCEDURE — 83036 HEMOGLOBIN GLYCOSYLATED A1C: CPT | Mod: ZL | Performed by: INTERNAL MEDICINE

## 2019-03-19 PROCEDURE — 85025 COMPLETE CBC W/AUTO DIFF WBC: CPT | Mod: ZL | Performed by: INTERNAL MEDICINE

## 2019-03-19 PROCEDURE — G0463 HOSPITAL OUTPT CLINIC VISIT: HCPCS

## 2019-03-19 PROCEDURE — 82043 UR ALBUMIN QUANTITATIVE: CPT | Mod: ZL | Performed by: INTERNAL MEDICINE

## 2019-03-19 PROCEDURE — G0463 HOSPITAL OUTPT CLINIC VISIT: HCPCS | Mod: 25

## 2019-03-19 PROCEDURE — 40000788 ZZHCL STATISTIC ESTIMATED AVERAGE GLUCOSE: Mod: ZL | Performed by: INTERNAL MEDICINE

## 2019-03-19 PROCEDURE — 80053 COMPREHEN METABOLIC PANEL: CPT | Mod: ZL | Performed by: INTERNAL MEDICINE

## 2019-03-19 PROCEDURE — 99213 OFFICE O/P EST LOW 20 MIN: CPT | Performed by: INTERNAL MEDICINE

## 2019-03-19 PROCEDURE — 36415 COLL VENOUS BLD VENIPUNCTURE: CPT | Mod: ZL | Performed by: INTERNAL MEDICINE

## 2019-03-19 RX ORDER — AMLODIPINE BESYLATE 5 MG/1
5 TABLET ORAL DAILY
Qty: 30 TABLET | Refills: 3 | Status: SHIPPED | OUTPATIENT
Start: 2019-03-19 | End: 2019-07-09

## 2019-03-19 ASSESSMENT — ANXIETY QUESTIONNAIRES
GAD7 TOTAL SCORE: 0
4. TROUBLE RELAXING: NOT AT ALL
6. BECOMING EASILY ANNOYED OR IRRITABLE: NOT AT ALL
5. BEING SO RESTLESS THAT IT IS HARD TO SIT STILL: NOT AT ALL
3. WORRYING TOO MUCH ABOUT DIFFERENT THINGS: NOT AT ALL
7. FEELING AFRAID AS IF SOMETHING AWFUL MIGHT HAPPEN: NOT AT ALL
1. FEELING NERVOUS, ANXIOUS, OR ON EDGE: NOT AT ALL
2. NOT BEING ABLE TO STOP OR CONTROL WORRYING: NOT AT ALL

## 2019-03-19 ASSESSMENT — PAIN SCALES - GENERAL: PAINLEVEL: NO PAIN (0)

## 2019-03-19 ASSESSMENT — PATIENT HEALTH QUESTIONNAIRE - PHQ9: SUM OF ALL RESPONSES TO PHQ QUESTIONS 1-9: 0

## 2019-03-19 NOTE — NURSING NOTE
"Chief Complaint   Patient presents with     Hypertension     Balance/ Vestibular       Initial BP (!) 144/92   Pulse 65   Temp 95.4  F (35.2  C) (Tympanic)   Wt 127 kg (280 lb)   SpO2 99%   BMI 35.95 kg/m   Estimated body mass index is 35.95 kg/m  as calculated from the following:    Height as of 1/16/19: 1.88 m (6' 2\").    Weight as of this encounter: 127 kg (280 lb).  Medication Reconciliation: complete    Ninoska Red LPN  "

## 2019-03-20 ASSESSMENT — ANXIETY QUESTIONNAIRES: GAD7 TOTAL SCORE: 0

## 2019-03-26 DIAGNOSIS — I10 BENIGN ESSENTIAL HYPERTENSION: ICD-10-CM

## 2019-03-26 RX ORDER — METOPROLOL TARTRATE 50 MG
TABLET ORAL
Qty: 60 TABLET | Refills: 3 | Status: SHIPPED | OUTPATIENT
Start: 2019-03-26 | End: 2019-06-24

## 2019-03-26 RX ORDER — LISINOPRIL 40 MG/1
TABLET ORAL
Qty: 30 TABLET | Refills: 3 | Status: SHIPPED | OUTPATIENT
Start: 2019-03-26 | End: 2019-06-24

## 2019-03-27 ENCOUNTER — OFFICE VISIT (OUTPATIENT)
Dept: NEUROLOGY | Facility: CLINIC | Age: 69
End: 2019-03-27
Payer: COMMERCIAL

## 2019-03-27 VITALS
SYSTOLIC BLOOD PRESSURE: 133 MMHG | HEART RATE: 74 BPM | HEIGHT: 74 IN | TEMPERATURE: 97.5 F | WEIGHT: 286.5 LBS | OXYGEN SATURATION: 97 % | BODY MASS INDEX: 36.77 KG/M2 | DIASTOLIC BLOOD PRESSURE: 84 MMHG | RESPIRATION RATE: 16 BRPM

## 2019-03-27 DIAGNOSIS — N40.0 BENIGN PROSTATIC HYPERPLASIA, UNSPECIFIED WHETHER LOWER URINARY TRACT SYMPTOMS PRESENT: ICD-10-CM

## 2019-03-27 DIAGNOSIS — I63.111 CEREBROVASCULAR ACCIDENT (CVA) DUE TO EMBOLISM OF RIGHT VERTEBRAL ARTERY (H): Primary | ICD-10-CM

## 2019-03-27 RX ORDER — TAMSULOSIN HYDROCHLORIDE 0.4 MG/1
0.4 CAPSULE ORAL DAILY
Qty: 90 CAPSULE | Refills: 0 | Status: SHIPPED | OUTPATIENT
Start: 2019-03-27 | End: 2019-06-11

## 2019-03-27 ASSESSMENT — PAIN SCALES - GENERAL: PAINLEVEL: NO PAIN (0)

## 2019-03-27 ASSESSMENT — MIFFLIN-ST. JEOR: SCORE: 2144.31

## 2019-03-27 NOTE — NURSING NOTE
Chief Complaint   Patient presents with     RECHECK     [facility] RETURN - IMAGING RESULTS     Celia Ferguson

## 2019-03-27 NOTE — TELEPHONE ENCOUNTER
tamsulosin (FLOMAX) 0.4 MG capsule    Last Written Prescription Date:  5/14/18  Last Fill Quantity: 90,   # refills: 3  Last Office Visit: 3/19/19  Future Office visit:

## 2019-03-27 NOTE — LETTER
"3/27/2019       RE: Deon Culver  [address]  [facility] [address]     Dear Colleague,    Thank you for referring your patient, Deon Culver, to the [facility] NEUROLOG[facility] at [facility]. Please see a copy of my visit note below.    Service Date: 2019       RE: Deon Culver   MRN: [id number]   : 1950      Dear Dr. Mendieta:      This is in regard to followup on Deon Culver. The patient returned today on 2019 routinely.  His chief complaint is that of a suspected prior medullary stroke as well as imbalance, prior dysarthria, and lumbar radiculopathy.      The patient last saw me in September.  He did have a followup MRI scan.  This continued to show a T2 signal lesion involving the right parasagittal aspect of the medulla.  This was similar in appearance suggesting encephalomalacia from a suspected previous vascular insult or stroke.  The patient did have normal MRA studies of the neck and intracranial arteries.  He was found to have cardiac abnormalities and has followed with his cardiologist.  He was due to be seen in January.  He also was found to have poorly controlled diabetes and this has been expertly controlled under your direction as well as hypertension.  He was able to lose weight, but he was forthright with me.  He has gained about 10-15 pounds this last winter.  He said he started to \"cheat\" in terms of his diet.  He still smokes a cigar about once a week ago, although he was admonished not to by his wife, daughter and me.  The patient said his blood pressure is under good control.  He and his family have noted that his balance has not been the same since he started to have trouble early .  He has been extremely careful on ice.  He, fortunately, has not suffered any falls.  He no longer has radicular left leg pain or low back pain.  Physical therapy did help that.  He did not think physical therapy helped his balance, but he is willing " to consider a trial of treatment again for it.  The patient did tell me that his A1c hemoglobin was recently 6 and 4 months ago was 5.7.  He never had any trouble swallowing and there is nothing to suggest a Ottoniel syndrome with ptosis, myosis or change in sweating on his forehead.  His speech became normal again.  He has not had any trouble swallowing.  He denied vertigo.  He has not had any paresthesias or cross sensory complaints.  He markedly has reduced his use of alcohol.  He has rarely drank some wine.  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  The patient has suffered chronic poor sleep for years.  He sleeps on a sofa.  He rarely snores.  He has always suffered from chronic insomnia.  He denied any trouble falling asleep during daytime hours at work nor has he ever been sleeping in his car.      The patient did have a followup evaluation with you and I reviewed this with them.  On 11/12/2018, his total cholesterol was 114 and his triglycerides 113.  His HDL was 53 and his LDL was 38.      The patient assured me that he is aware he has a 4.4 cm aortic aneurysm and it is being carefully followed.  He also is aware that his MRI scan of the lumbar spine from 07/13/2018 did show a non-spondylitic spondylolisthesis at L4-L5 with ligamentum flavum hypertrophy with some compression of both L5 nerve roots in the lateral recess.  At the L3-4 level, he had ligament flavum hypertrophy and small synovial cyst compressing the right L4 nerve root.  He again has no interest in neurosurgical or orthopedic consultation.      The patient just saw you on 03/19/2019.  He did go over dosages and he has continued to take amlodipine, low-dose aspirin, atorvastatin, vitamin D3, cinnamon, garlic, lisinopril, metformin, metoprolol, Flomax, and turmeric.  He had on that date a hemoglobin 13.4 grams, white count of 6400 and a platelet count of 179,000.      The patient did tell me that at home his blood pressure has been higher than it has been  recorded in our offices.  This could be explained in part that he does use a wrist cuff at times which would probably inaccurately give a blood pressure result and I pointed this out to them.  He does have a large cuff, though, that he has used for upper arm blood pressure.      Neurologic examination revealed a pleasant man.  He is obese.  His blood pressure is 133/84 with a pulse of 74.  He had no trouble getting out of his chair and he could walk with a slightly wide-based gait.  He had trouble with tandem and he had negative Romberg.  Muscle stretch reflexes were present and symmetric except for absent ankle jerks.  His toes were downward going to plantar stimulation.  Strength testing was all normal today.  He had normal rapid alternating motion rate and no signs of dysmetria.  He had no crossed sensory changes to light touch and normal vibratory and position sense testing.  Cranial nerve examination was totally unremarkable.  His tongue and uvula were midline.  He had normal palatal elevation.  The patient had normal cranial motor strength.  He had good extraocular movements.  There was no sign of pupillary asymmetry or any ptosis.  I could not auscultate cervical bruits.  He had a regular cardiac rhythm without gallops or murmurs.        IMPRESSION:   1.  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   2.  Vascular risk factors which are being addressed.   3.  Known lumbar spondylolisthesis with bilateral L5 nerve root impingement.      The patient overall is doing quite well from my standpoint.  There was some initial concern about his MRI scan changes.  I have suggested he have a followup scan a year from the earlier one done to rule out stability.  This does look like an area of encephalomalacia and I suspect this does relate to a small penetrating artery stroke in the medulla.  His large vessels are normal.  He does have low back issues that could affect his  balance also, but his balance issues could have come from possible stroke too.  He is going to continue to work carefully with you.  He promised to stop smoking cigars and limit his wine intake.  He will be seen in this office in 6 months and on a p.r.n. basis.  He understands the warning signs of stroke and his family does too.  He is to have immediate evaluation if he has any new symptoms.  He denied any today.      I spent 30 minutes with the patient today.  Over 50% of the time involved counseling and coordination of care.      Thank you for allowing me to see this patient.         D: 2019   T: 2019   MT: laly      Name:     BETO BERRY   MRN:      [id number]        Account:      [id number]   :      1950           Service Date: 2019      Document: [id number]        Again, thank you for allowing me to participate in the care of your patient.      Sincerely,    Jean-Claude Hopkins MD    CC:     Sreedhar Mendieta,    [facility]   [address] [place]  [address]

## 2019-03-28 NOTE — PROGRESS NOTES
"Service Date: 2019      Sreedhar Mendieta, DO   [facility]   [address] [place]  [address]      RE: Deon Culver   MRN: [id number]   : 1950      Dear Dr. Mendieta:      This is in regard to followup on Deon Culver. The patient returned today on 2019 routinely.  His chief complaint is that of a suspected prior medullary stroke as well as imbalance, prior dysarthria, and lumbar radiculopathy.      The patient last saw me in September.  He did have a followup MRI scan.  This continued to show a T2 signal lesion involving the right parasagittal aspect of the medulla.  This was similar in appearance suggesting encephalomalacia from a suspected previous vascular insult or stroke.  The patient did have normal MRA studies of the neck and intracranial arteries.  He was found to have cardiac abnormalities and has followed with his cardiologist.  He was due to be seen in January.  He also was found to have poorly controlled diabetes and this has been expertly controlled under your direction as well as hypertension.  He was able to lose weight, but he was forthright with me.  He has gained about 10-15 pounds this last winter.  He said he started to \"cheat\" in terms of his diet.  He still smokes a cigar about once a week ago, although he was admonished not to by his wife, daughter and me.  The patient said his blood pressure is under good control.  He and his family have noted that his balance has not been the same since he started to have trouble early .  He has been extremely careful on ice.  He, fortunately, has not suffered any falls.  He no longer has radicular left leg pain or low back pain.  Physical therapy did help that.  He did not think physical therapy helped his balance, but he is willing to consider a trial of treatment again for it.  The patient did tell me that his A1c hemoglobin was recently 6 and 4 months ago was 5.7.  He never had any trouble " swallowing and there is nothing to suggest a Ottoniel syndrome with ptosis, myosis or change in sweating on his forehead.  His speech became normal again.  He has not had any trouble swallowing.  He denied vertigo.  He has not had any paresthesias or cross sensory complaints.  He markedly has reduced his use of alcohol.  He has rarely drank some wine.  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  The patient has suffered chronic poor sleep for years.  He sleeps on a sofa.  He rarely snores.  He has always suffered from chronic insomnia.  He denied any trouble falling asleep during daytime hours at work nor has he ever been sleeping in his car.      The patient did have a followup evaluation with you and I reviewed this with them.  On 11/12/2018, his total cholesterol was 114 and his triglycerides 113.  His HDL was 53 and his LDL was 38.      The patient assured me that he is aware he has a 4.4 cm aortic aneurysm and it is being carefully followed.  He also is aware that his MRI scan of the lumbar spine from 07/13/2018 did show a non-spondylitic spondylolisthesis at L4-L5 with ligamentum flavum hypertrophy with some compression of both L5 nerve roots in the lateral recess.  At the L3-4 level, he had ligament flavum hypertrophy and small synovial cyst compressing the right L4 nerve root.  He again has no interest in neurosurgical or orthopedic consultation.      The patient just saw you on 03/19/2019.  He did go over dosages and he has continued to take amlodipine, low-dose aspirin, atorvastatin, vitamin D3, cinnamon, garlic, lisinopril, metformin, metoprolol, Flomax, and turmeric.  He had on that date a hemoglobin 13.4 grams, white count of 6400 and a platelet count of 179,000.      The patient did tell me that at home his blood pressure has been higher than it has been recorded in our offices.  This could be explained in part that he does use a wrist cuff at times which would probably inaccurately give a blood pressure result  and I pointed this out to them.  He does have a large cuff, though, that he has used for upper arm blood pressure.      Neurologic examination revealed a pleasant man.  He is obese.  His blood pressure is 133/84 with a pulse of 74.  He had no trouble getting out of his chair and he could walk with a slightly wide-based gait.  He had trouble with tandem and he had negative Romberg.  Muscle stretch reflexes were present and symmetric except for absent ankle jerks.  His toes were downward going to plantar stimulation.  Strength testing was all normal today.  He had normal rapid alternating motion rate and no signs of dysmetria.  He had no crossed sensory changes to light touch and normal vibratory and position sense testing.  Cranial nerve examination was totally unremarkable.  His tongue and uvula were midline.  He had normal palatal elevation.  The patient had normal cranial motor strength.  He had good extraocular movements.  There was no sign of pupillary asymmetry or any ptosis.  I could not auscultate cervical bruits.  He had a regular cardiac rhythm without gallops or murmurs.        IMPRESSION:   1.  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   2.  Vascular risk factors which are being addressed.   3.  Known lumbar spondylolisthesis with bilateral L5 nerve root impingement.      The patient overall is doing quite well from my standpoint.  There was some initial concern about his MRI scan changes.  I have suggested he have a followup scan a year from the earlier one done to rule out stability.  This does look like an area of encephalomalacia and I suspect this does relate to a small penetrating artery stroke in the medulla.  His large vessels are normal.  He does have low back issues that could affect his balance also, but his balance issues could have come from possible stroke too.  He is going to continue to work carefully with you.  He promised to stop  smoking cigars and limit his wine intake.  He will be seen in this office in 6 months and on a p.r.n. basis.  He understands the warning signs of stroke and his family does too.  He is to have immediate evaluation if he has any new symptoms.  He denied any today.      I spent 30 minutes with the patient today.  Over 50% of the time involved counseling and coordination of care.      Thank you for allowing me to see this patient.      Sincerely,      MD KELLI Caicedo MD             D: 2019   T: 2019   MT: laly      Name:     BETO BERRY   MRN:      [id number]        Account:      [id number]   :      1950           Service Date: 2019      Document: [id number]

## 2019-05-07 PROBLEM — Z71.6 TOBACCO ABUSE COUNSELING: Status: RESOLVED | Noted: 2018-07-06 | Resolved: 2019-05-07

## 2019-06-10 DIAGNOSIS — N40.0 BENIGN PROSTATIC HYPERPLASIA, UNSPECIFIED WHETHER LOWER URINARY TRACT SYMPTOMS PRESENT: ICD-10-CM

## 2019-06-10 NOTE — TELEPHONE ENCOUNTER
Flomax       Last Written Prescription Date:  3/27/2019  Last Fill Quantity: 90,   # refills: 0  Last Office Visit: 3/27/2019  Future Office visit:

## 2019-06-11 RX ORDER — TAMSULOSIN HYDROCHLORIDE 0.4 MG/1
0.4 CAPSULE ORAL DAILY
Qty: 90 CAPSULE | Refills: 0 | Status: SHIPPED | OUTPATIENT
Start: 2019-06-11 | End: 2019-08-05

## 2019-06-24 DIAGNOSIS — E11.8 TYPE 2 DIABETES MELLITUS WITH COMPLICATION, WITHOUT LONG-TERM CURRENT USE OF INSULIN (H): ICD-10-CM

## 2019-06-24 DIAGNOSIS — I10 BENIGN ESSENTIAL HYPERTENSION: ICD-10-CM

## 2019-06-24 RX ORDER — LISINOPRIL 40 MG/1
40 TABLET ORAL DAILY
Qty: 90 TABLET | Refills: 3 | Status: SHIPPED | OUTPATIENT
Start: 2019-06-24 | End: 2019-08-28

## 2019-06-24 RX ORDER — METFORMIN HCL 500 MG
TABLET, EXTENDED RELEASE 24 HR ORAL
Qty: 360 TABLET | Refills: 0 | Status: SHIPPED | OUTPATIENT
Start: 2019-06-24 | End: 2019-08-28

## 2019-06-24 RX ORDER — METOPROLOL TARTRATE 50 MG
50 TABLET ORAL 2 TIMES DAILY
Qty: 60 TABLET | Refills: 3 | Status: SHIPPED | OUTPATIENT
Start: 2019-06-24 | End: 2019-08-28

## 2019-06-24 NOTE — TELEPHONE ENCOUNTER
Lisinopril protocol failed due to    Normal serum creatinine on file in past 12 months       Metformin protocol failed due to    Patient has had a Microalbumin in the past 15 mos.    Patient's CR is NOT>1.4 OR Patient's EGFR is NOT<45 within past 12 mos.       Please advise. Thank you.

## 2019-06-24 NOTE — TELEPHONE ENCOUNTER
Metoprolol  Last Written Prescription Date: 3/26/19  Last Fill Quantity: 60 # of Refills: 3  Last Office Visit: 3/1919    Pt does not have any left

## 2019-06-24 NOTE — TELEPHONE ENCOUNTER
Lisinopril  Last Written Prescription Date: 3/26/19  Last Fill Quantity: 30 # of Refills: 3  Last Office Visit: 3/19/19    Metoprolol  Last Written Prescription Date: 3/26/19  Last Fill Quantity: 60 # of Refills: 3  Last Office Visit: 3/19/19    Pt has no pills left

## 2019-07-09 DIAGNOSIS — I10 HYPERTENSION, UNSPECIFIED TYPE: ICD-10-CM

## 2019-07-09 RX ORDER — AMLODIPINE BESYLATE 5 MG/1
5 TABLET ORAL DAILY
Qty: 30 TABLET | Refills: 3 | Status: SHIPPED | OUTPATIENT
Start: 2019-07-09 | End: 2019-08-28

## 2019-07-09 NOTE — TELEPHONE ENCOUNTER
Norvasc  Last visit date with prescribing provider: 3-  Last refill date: 3-  Quantity: 30, Refills: 3    Catie Ramirez

## 2019-08-05 DIAGNOSIS — E78.5 HYPERLIPIDEMIA LDL GOAL <100: ICD-10-CM

## 2019-08-05 DIAGNOSIS — N40.0 BENIGN PROSTATIC HYPERPLASIA, UNSPECIFIED WHETHER LOWER URINARY TRACT SYMPTOMS PRESENT: ICD-10-CM

## 2019-08-05 RX ORDER — ATORVASTATIN CALCIUM 40 MG/1
TABLET, FILM COATED ORAL
Qty: 90 TABLET | Refills: 0 | Status: SHIPPED | OUTPATIENT
Start: 2019-08-05 | End: 2019-08-28

## 2019-08-05 RX ORDER — TAMSULOSIN HYDROCHLORIDE 0.4 MG/1
0.4 CAPSULE ORAL DAILY
Qty: 90 CAPSULE | Refills: 0 | Status: SHIPPED | OUTPATIENT
Start: 2019-08-05 | End: 2019-08-28

## 2019-08-05 NOTE — TELEPHONE ENCOUNTER
Flomax       Last Written Prescription Date:  6/11/2019  Last Fill Quantity: 90,   # refills: 0  Last Office Visit: 3/27/2019  Future Office visit:    Next 5 appointments (look out 90 days)    Aug 28, 2019  4:00 PM CDT  (Arrive by 3:45 PM)  SHORT with Sreedhar Mendieta DO  [facility] ([facility] ) [address]  [place] [id number] [phone]

## 2019-08-17 DIAGNOSIS — E78.5 HYPERLIPIDEMIA LDL GOAL <100: ICD-10-CM

## 2019-08-20 RX ORDER — ATORVASTATIN CALCIUM 40 MG/1
TABLET, FILM COATED ORAL
Qty: 90 TABLET | Refills: 0 | Status: SHIPPED | OUTPATIENT
Start: 2019-08-20 | End: 2019-08-28

## 2019-08-21 NOTE — PROGRESS NOTES
Subjective     Deon Culver is a 69 year old male who presents to clinic today for the following health issues:    HPI   Diabetes Follow-up      How often are you checking your blood sugar? Not at all    What time of day are you checking your blood sugars (select all that apply)?  none    Have you had any blood sugars above 200?  No    Have you had any blood sugars below 70?  No    What symptoms do you notice when your blood sugar is low?  None    What concerns do you have today about your diabetes? None     Do you have any of these symptoms? (Select all that apply)  Numbness in feet  Balance issues     Have you had a diabetic eye exam in the last 12 months? Yes- Date of last eye exam: sept 2019 scheduled     Diabetes Management Resources    Hyperlipidemia Follow-Up      Are you having any of the following symptoms? (Select all that apply)  No complaints of shortness of breath, chest pain or pressure.  No increased sweating or nausea with activity.  No left-sided neck or arm pain.  No complaints of pain in calves when walking 1-2 blocks.    Are you regularly taking any medication or supplement to lower your cholesterol?   Yes- Lipitor     Are you having muscle aches or other side effects that you think could be caused by your cholesterol lowering medication?  No    Hypertension Follow-up      Do you check your blood pressure regularly outside of the clinic? Yes     Are you following a low salt diet? Yes    Are your blood pressures ever more than 140 on the top number (systolic) OR more   than 90 on the bottom number (diastolic), for example 140/90? No    BP Readings from Last 2 Encounters:   08/28/19 129/86   03/27/19 133/84     Hemoglobin A1C (%)   Date Value   03/19/2019 6.0 (H)   11/12/2018 5.7 (H)     LDL Cholesterol Calculated (mg/dL)   Date Value   11/12/2018 38   04/30/2018 86     Mendoza presents today for follow up of his HTN, Hyperlipidemia and diabetes.  Mendoza also has a history of CVA, CKD stage 3 and  Hypertrophic cardiomyopathy and ascending aortic aneurysm.     Today he denies any complaints such as chest pain.  His breathing is stable.  He does report some trouble with ambulation and balance which has been an ongoing issue for him since his CVA.     Patient Active Problem List   Diagnosis     Benign essential hypertension     Obesity     Type 2 diabetes mellitus      Chronic fatigue     Slurred speech x 3 months, [facility]     Vision changes x 3 months as of      Chronic bilateral low back pain with sciatica     Altered gait     Altered mental status - since approx  - delayed response, change in mentation     Hypertrophic cardiomyopathy at 2.2 cm     Ascending aortic aneurysm at 4.45 cm on echo from 19     SOB (shortness of breath)     Right ventricular enlargement     Chronic kidney disease, stage 3 (moderate) (H)     History of tobacco abuse     H/O ETOH abuse     History of CVA 3/18     Special screening for malignant neoplasms, colon     Tobacco abuse     Diastolic dysfunction grade 1 on 19     Obesity (BMI 35.0-39.9) with comorbidity (H)     History reviewed. No pertinent surgical history.    Social History     Tobacco Use     Smoking status: Current Some Day Smoker     Packs/day: 0.50     Types: Cigars     Start date: 2003     Last attempt to quit: 2018     Years since quittin.2     Smokeless tobacco: Never Used   Substance Use Topics     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      Family History   Problem Relation Age of Onset     Colon Cancer Mother      Kidney Cancer Father      Kidney Disease Father      Coronary Artery Disease Brother      Pulmonary Embolism Brother      Coronary Artery Disease Brother      Myocardial Infarction Brother          Current Outpatient Medications   Medication Sig Dispense Refill     amLODIPine (NORVASC) 5 MG tablet Take 1 tablet (5 mg) by mouth daily 90 tablet 3     ASPIRIN PO Take 81 mg by mouth daily        atorvastatin (LIPITOR) 40 MG tablet Take 1 tablet (40 mg) by mouth daily 90 tablet 3     Cholecalciferol (VITAMIN D-3) 1000 units CAPS Take 1,000 Units by mouth       CINNAMON PO Take 1,000 mg by mouth       Garlic 1000 MG CAPS        lisinopril (PRINIVIL/ZESTRIL) 40 MG tablet Take 1 tablet (40 mg) by mouth daily 90 tablet 3     metFORMIN (GLUCOPHAGE-XR) 500 MG 24 hr tablet TAKE 2 TABLETS(1000 MG) BY MOUTH TWICE DAILY WITH MEALS 360 tablet 3     metoprolol tartrate (LOPRESSOR) 50 MG tablet Take 1 tablet (50 mg) by mouth 2 times daily 60 tablet 3     order for DME Equipment being ordered: Stationary Bike 1 Device 0     tamsulosin (FLOMAX) 0.4 MG capsule Take 1 capsule (0.4 mg) by mouth daily 90 capsule 3     TURMERIC CURCUMIN PO Take 1,500 mg by mouth       No Known Allergies  Recent Labs   Lab Test 03/19/19  1424 11/12/18  1608 07/25/18  1527 07/06/18  1426  04/30/18  1048   A1C 6.0* 5.7*  --  7.7*  --   --    LDL  --  38  --   --   --  86   HDL  --  53  --   --   --  50   TRIG  --  113  --   --   --  196*   ALT 21 26 27  --    < >  --    CR 1.74* 1.54* 1.53* 2.11*   < >  --    GFRESTIMATED 39* 45* 46* 31*   < >  --    GFRESTBLACK 46* 55* 55* 38*   < >  --    POTASSIUM 4.6 4.4 4.8 5.1   < >  --    TSH  --   --   --   --   --  1.43    < > = values in this interval not displayed.      BP Readings from Last 3 Encounters:   08/28/19 129/86   03/27/19 133/84   03/19/19 130/84    Wt Readings from Last 3 Encounters:   08/28/19 133.4 kg (294 lb)   03/27/19 130 kg (286 lb 8 oz)   03/19/19 127 kg (280 lb)                  Reviewed and updated as needed this visit by Provider         Review of Systems   ROS COMP: Constitutional, HEENT, cardiovascular, pulmonary, gi and gu systems are negative, except as otherwise noted.      Objective    /86 (BP Location: Right arm, Patient Position: Chair, Cuff Size: Adult Large)   Pulse 74   Temp 97.6  F (36.4  C) (Tympanic)   Wt 133.4 kg (294 lb)   SpO2 98%   BMI 37.75 kg/m     Body mass index is 37.75 kg/m .  Physical Exam   GENERAL: Alert and no distress  EYES: Eyes grossly normal  RESP: lungs clear to auscultation - no rales, rhonchi or wheezes  CV: regular rate and rhythm, normal S1 S2, no S3 or S4, no murmurs  ABDOMEN: Obese- soft, nontender, no hepatosplenomegaly,  MS: + 1 to + 2 LE edema    SKIN: Dry skin of feet and significant toenail growth on both feet.    NEURO: Monofilament testing reveals sensation intact in both feet.    PSYCH: mentation appears normal, affect normal/bright    Diagnostic Test Results:  pending        Assessment & Plan   Problem List Items Addressed This Visit        Endocrine    Type 2 diabetes mellitus     Relevant Medications    metFORMIN (GLUCOPHAGE-XR) 500 MG 24 hr tablet    Other Relevant Orders    Hemoglobin A1c    PODIATRY/FOOT & ANKLE SURGERY REFERRAL       Circulatory    Benign essential hypertension    Relevant Medications    lisinopril (PRINIVIL/ZESTRIL) 40 MG tablet    metoprolol tartrate (LOPRESSOR) 50 MG tablet    amLODIPine (NORVASC) 5 MG tablet       Urinary    Chronic kidney disease, stage 3 (moderate) (H) - Primary    Relevant Orders    Comprehensive metabolic panel      Other Visit Diagnoses     Benign prostatic hyperplasia, unspecified whether lower urinary tract symptoms present        Relevant Medications    tamsulosin (FLOMAX) 0.4 MG capsule    Hyperlipidemia LDL goal <100        Relevant Medications    atorvastatin (LIPITOR) 40 MG tablet    Hypertension, unspecified type        Relevant Medications    lisinopril (PRINIVIL/ZESTRIL) 40 MG tablet           Follow up in 3-4 months      Sreedhar Mendieta, [facility]

## 2019-08-28 ENCOUNTER — OFFICE VISIT (OUTPATIENT)
Dept: INTERNAL MEDICINE | Facility: OTHER | Age: 69
End: 2019-08-28
Attending: FAMILY MEDICINE
Payer: COMMERCIAL

## 2019-08-28 VITALS
HEART RATE: 74 BPM | TEMPERATURE: 97.6 F | OXYGEN SATURATION: 98 % | BODY MASS INDEX: 37.75 KG/M2 | SYSTOLIC BLOOD PRESSURE: 129 MMHG | WEIGHT: 294 LBS | DIASTOLIC BLOOD PRESSURE: 86 MMHG

## 2019-08-28 DIAGNOSIS — E78.5 HYPERLIPIDEMIA LDL GOAL <100: ICD-10-CM

## 2019-08-28 DIAGNOSIS — I10 HYPERTENSION, UNSPECIFIED TYPE: ICD-10-CM

## 2019-08-28 DIAGNOSIS — I10 BENIGN ESSENTIAL HYPERTENSION: ICD-10-CM

## 2019-08-28 DIAGNOSIS — N18.30 CHRONIC KIDNEY DISEASE, STAGE 3 (MODERATE): Primary | ICD-10-CM

## 2019-08-28 DIAGNOSIS — Z23 NEED FOR VACCINATION: ICD-10-CM

## 2019-08-28 DIAGNOSIS — E11.8 TYPE 2 DIABETES MELLITUS WITH COMPLICATION, WITHOUT LONG-TERM CURRENT USE OF INSULIN (H): ICD-10-CM

## 2019-08-28 DIAGNOSIS — N40.0 BENIGN PROSTATIC HYPERPLASIA, UNSPECIFIED WHETHER LOWER URINARY TRACT SYMPTOMS PRESENT: ICD-10-CM

## 2019-08-28 PROCEDURE — 36415 COLL VENOUS BLD VENIPUNCTURE: CPT | Mod: ZL | Performed by: INTERNAL MEDICINE

## 2019-08-28 PROCEDURE — 99214 OFFICE O/P EST MOD 30 MIN: CPT | Performed by: INTERNAL MEDICINE

## 2019-08-28 PROCEDURE — G0463 HOSPITAL OUTPT CLINIC VISIT: HCPCS

## 2019-08-28 PROCEDURE — 90472 IMMUNIZATION ADMIN EACH ADD: CPT | Performed by: INTERNAL MEDICINE

## 2019-08-28 PROCEDURE — 90714 TD VACC NO PRESV 7 YRS+ IM: CPT

## 2019-08-28 PROCEDURE — 90471 IMMUNIZATION ADMIN: CPT | Performed by: INTERNAL MEDICINE

## 2019-08-28 PROCEDURE — 90670 PCV13 VACCINE IM: CPT

## 2019-08-28 PROCEDURE — G0463 HOSPITAL OUTPT CLINIC VISIT: HCPCS | Mod: 25

## 2019-08-28 PROCEDURE — 83036 HEMOGLOBIN GLYCOSYLATED A1C: CPT | Mod: ZL | Performed by: INTERNAL MEDICINE

## 2019-08-28 PROCEDURE — 40000788 ZZHCL STATISTIC ESTIMATED AVERAGE GLUCOSE: Mod: ZL | Performed by: INTERNAL MEDICINE

## 2019-08-28 PROCEDURE — 80053 COMPREHEN METABOLIC PANEL: CPT | Mod: ZL | Performed by: INTERNAL MEDICINE

## 2019-08-28 RX ORDER — LISINOPRIL 40 MG/1
40 TABLET ORAL DAILY
Qty: 90 TABLET | Refills: 3 | Status: SHIPPED | OUTPATIENT
Start: 2019-08-28 | End: 2020-01-17

## 2019-08-28 RX ORDER — ATORVASTATIN CALCIUM 40 MG/1
40 TABLET, FILM COATED ORAL DAILY
Qty: 90 TABLET | Refills: 3 | Status: SHIPPED | OUTPATIENT
Start: 2019-08-28 | End: 2020-01-17

## 2019-08-28 RX ORDER — METOPROLOL TARTRATE 50 MG
50 TABLET ORAL 2 TIMES DAILY
Qty: 60 TABLET | Refills: 3 | Status: SHIPPED | OUTPATIENT
Start: 2019-08-28 | End: 2020-01-17

## 2019-08-28 RX ORDER — TAMSULOSIN HYDROCHLORIDE 0.4 MG/1
0.4 CAPSULE ORAL DAILY
Qty: 90 CAPSULE | Refills: 3 | Status: SHIPPED | OUTPATIENT
Start: 2019-08-28 | End: 2020-01-17

## 2019-08-28 RX ORDER — METFORMIN HCL 500 MG
TABLET, EXTENDED RELEASE 24 HR ORAL
Qty: 360 TABLET | Refills: 3 | Status: SHIPPED | OUTPATIENT
Start: 2019-08-28 | End: 2020-01-17

## 2019-08-28 RX ORDER — AMLODIPINE BESYLATE 5 MG/1
5 TABLET ORAL DAILY
Qty: 90 TABLET | Refills: 3 | Status: SHIPPED | OUTPATIENT
Start: 2019-08-28 | End: 2020-01-17

## 2019-08-28 ASSESSMENT — ANXIETY QUESTIONNAIRES
1. FEELING NERVOUS, ANXIOUS, OR ON EDGE: NOT AT ALL
GAD7 TOTAL SCORE: 0
6. BECOMING EASILY ANNOYED OR IRRITABLE: NOT AT ALL
3. WORRYING TOO MUCH ABOUT DIFFERENT THINGS: NOT AT ALL
2. NOT BEING ABLE TO STOP OR CONTROL WORRYING: NOT AT ALL
5. BEING SO RESTLESS THAT IT IS HARD TO SIT STILL: NOT AT ALL
7. FEELING AFRAID AS IF SOMETHING AWFUL MIGHT HAPPEN: NOT AT ALL
4. TROUBLE RELAXING: NOT AT ALL

## 2019-08-28 ASSESSMENT — PATIENT HEALTH QUESTIONNAIRE - PHQ9: SUM OF ALL RESPONSES TO PHQ QUESTIONS 1-9: 0

## 2019-08-28 ASSESSMENT — PAIN SCALES - GENERAL: PAINLEVEL: NO PAIN (0)

## 2019-08-28 NOTE — NURSING NOTE
"Chief Complaint   Patient presents with     Hypertension     Diabetes     Lipids       Initial /86 (BP Location: Right arm, Patient Position: Chair, Cuff Size: Adult Large)   Pulse 74   Temp 97.6  F (36.4  C) (Tympanic)   Wt 133.4 kg (294 lb)   SpO2 98%   BMI 37.75 kg/m   Estimated body mass index is 37.75 kg/m  as calculated from the following:    Height as of 3/27/19: 1.88 m (6' 2\").    Weight as of this encounter: 133.4 kg (294 lb).  Medication Reconciliation: complete  "

## 2019-08-29 DIAGNOSIS — N18.30 CKD (CHRONIC KIDNEY DISEASE) STAGE 3, GFR 30-59 ML/MIN (H): Primary | ICD-10-CM

## 2019-08-29 DIAGNOSIS — E83.52 HYPERCALCEMIA: ICD-10-CM

## 2019-08-29 LAB
ALBUMIN SERPL-MCNC: 4.4 G/DL (ref 3.4–5)
ALP SERPL-CCNC: 69 U/L (ref 40–150)
ALT SERPL W P-5'-P-CCNC: 28 U/L (ref 0–70)
ANION GAP SERPL CALCULATED.3IONS-SCNC: 8 MMOL/L (ref 3–14)
AST SERPL W P-5'-P-CCNC: 13 U/L (ref 0–45)
BILIRUB SERPL-MCNC: 0.8 MG/DL (ref 0.2–1.3)
BUN SERPL-MCNC: 36 MG/DL (ref 7–30)
CALCIUM SERPL-MCNC: 10.4 MG/DL (ref 8.5–10.1)
CHLORIDE SERPL-SCNC: 104 MMOL/L (ref 94–109)
CO2 SERPL-SCNC: 24 MMOL/L (ref 20–32)
CREAT SERPL-MCNC: 1.78 MG/DL (ref 0.66–1.25)
EST. AVERAGE GLUCOSE BLD GHB EST-MCNC: 126 MG/DL
GFR SERPL CREATININE-BSD FRML MDRD: 38 ML/MIN/{1.73_M2}
GLUCOSE SERPL-MCNC: 170 MG/DL (ref 70–99)
HBA1C MFR BLD: 6 % (ref 0–5.6)
POTASSIUM SERPL-SCNC: 4.2 MMOL/L (ref 3.4–5.3)
PROT SERPL-MCNC: 7.6 G/DL (ref 6.8–8.8)
SODIUM SERPL-SCNC: 136 MMOL/L (ref 133–144)

## 2019-08-29 ASSESSMENT — ANXIETY QUESTIONNAIRES: GAD7 TOTAL SCORE: 0

## 2019-09-04 DIAGNOSIS — N18.30 CKD (CHRONIC KIDNEY DISEASE) STAGE 3, GFR 30-59 ML/MIN (H): ICD-10-CM

## 2019-09-04 DIAGNOSIS — E83.52 HYPERCALCEMIA: ICD-10-CM

## 2019-09-04 PROCEDURE — 80048 BASIC METABOLIC PNL TOTAL CA: CPT | Mod: ZL | Performed by: INTERNAL MEDICINE

## 2019-09-04 PROCEDURE — 36415 COLL VENOUS BLD VENIPUNCTURE: CPT | Mod: ZL | Performed by: INTERNAL MEDICINE

## 2019-09-05 LAB
ANION GAP SERPL CALCULATED.3IONS-SCNC: 8 MMOL/L (ref 3–14)
BUN SERPL-MCNC: 34 MG/DL (ref 7–30)
CALCIUM SERPL-MCNC: 9.9 MG/DL (ref 8.5–10.1)
CHLORIDE SERPL-SCNC: 105 MMOL/L (ref 94–109)
CO2 SERPL-SCNC: 24 MMOL/L (ref 20–32)
CREAT SERPL-MCNC: 1.59 MG/DL (ref 0.66–1.25)
GFR SERPL CREATININE-BSD FRML MDRD: 44 ML/MIN/{1.73_M2}
GLUCOSE SERPL-MCNC: 126 MG/DL (ref 70–99)
POTASSIUM SERPL-SCNC: 4.8 MMOL/L (ref 3.4–5.3)
SODIUM SERPL-SCNC: 137 MMOL/L (ref 133–144)

## 2019-09-11 ENCOUNTER — TRANSFERRED RECORDS (OUTPATIENT)
Dept: HEALTH INFORMATION MANAGEMENT | Facility: CLINIC | Age: 69
End: 2019-09-11

## 2019-09-18 ENCOUNTER — OFFICE VISIT (OUTPATIENT)
Dept: PODIATRY | Facility: OTHER | Age: 69
End: 2019-09-18
Attending: INTERNAL MEDICINE
Payer: COMMERCIAL

## 2019-09-18 VITALS
BODY MASS INDEX: 37.73 KG/M2 | HEIGHT: 74 IN | WEIGHT: 294 LBS | SYSTOLIC BLOOD PRESSURE: 132 MMHG | TEMPERATURE: 98.1 F | DIASTOLIC BLOOD PRESSURE: 84 MMHG

## 2019-09-18 DIAGNOSIS — M21.41 PES PLANUS OF BOTH FEET: ICD-10-CM

## 2019-09-18 DIAGNOSIS — L60.3 ONYCHODYSTROPHY: Primary | ICD-10-CM

## 2019-09-18 DIAGNOSIS — L85.3 XEROSIS OF SKIN: ICD-10-CM

## 2019-09-18 DIAGNOSIS — N18.30 CHRONIC KIDNEY DISEASE, STAGE 3 (MODERATE): ICD-10-CM

## 2019-09-18 DIAGNOSIS — Z86.73 HISTORY OF CVA (CEREBROVASCULAR ACCIDENT): ICD-10-CM

## 2019-09-18 DIAGNOSIS — M21.42 PES PLANUS OF BOTH FEET: ICD-10-CM

## 2019-09-18 DIAGNOSIS — E11.9 DIABETES MELLITUS TYPE 2, NONINSULIN DEPENDENT (H): ICD-10-CM

## 2019-09-18 DIAGNOSIS — E11.42 DIABETIC POLYNEUROPATHY ASSOCIATED WITH TYPE 2 DIABETES MELLITUS (H): ICD-10-CM

## 2019-09-18 PROCEDURE — 99203 OFFICE O/P NEW LOW 30 MIN: CPT | Mod: 25 | Performed by: PODIATRIST

## 2019-09-18 PROCEDURE — 11721 DEBRIDE NAIL 6 OR MORE: CPT | Mod: Q8 | Performed by: PODIATRIST

## 2019-09-18 PROCEDURE — G0463 HOSPITAL OUTPT CLINIC VISIT: HCPCS | Mod: 25

## 2019-09-18 ASSESSMENT — MIFFLIN-ST. JEOR: SCORE: 2173.33

## 2019-09-18 ASSESSMENT — PAIN SCALES - GENERAL: PAINLEVEL: NO PAIN (0)

## 2019-09-18 NOTE — NURSING NOTE
"Chief Complaint   Patient presents with     Consult     Diabetic Foot Exam, Nail Trimming; Referred by Dr. Mendieta       Initial /84 (Cuff Size: Adult Large)   Temp 98.1  F (36.7  C) (Tympanic)   Ht 1.88 m (6' 2\")   Wt 133.4 kg (294 lb)   BMI 37.75 kg/m   Estimated body mass index is 37.75 kg/m  as calculated from the following:    Height as of this encounter: 1.88 m (6' 2\").    Weight as of this encounter: 133.4 kg (294 lb).  Medication Reconciliation: complete  Luh Paulino LPN      "

## 2019-09-18 NOTE — LETTER
"    9/18/2019         RE: Deon Culver  [address]  [facility] [address]        Dear Colleague,    Thank you for referring your patient, Deon Culver, to the [facility]. Please see a copy of my visit note below.    Chief complaint: Patient presents with:  Consult: Diabetic Foot Exam, Nail Trimming; Referred by Dr. Mendieta      History of Present Illness: This 69 year old male is seen at the request of Gayatri for evaluation and suggestions of management of elongated toenails and a diabetic foot exam. Patient has pain from the toenails when they catch on his socks, but they otherwise to not cause him pain. He used to trim his nails himself, but he has been unable to trim them recently and he says it has been a long time since they have been trimmed. He says his blood sugars have been well controlled. He gets tingling and burning sensations in both of his feet. No further pedal complaints today.       /84 (Cuff Size: Adult Large)   Temp 98.1  F (36.7  C) (Tympanic)   Ht 1.88 m (6' 2\")   Wt 133.4 kg (294 lb)   BMI 37.75 kg/m       Patient Active Problem List   Diagnosis     Benign essential hypertension     Obesity     Type 2 diabetes mellitus      Chronic fatigue     Slurred speech x 3 months, CT neg ED [facility] 4/25/18     Vision changes x 3 months as of 4/18     Chronic bilateral low back pain with sciatica     Altered gait     Altered mental status - since approx 1/18 - delayed response, change in mentation     Hypertrophic cardiomyopathy at 2.2 cm     Ascending aortic aneurysm at 4.45 cm on echo from 1/7/19     SOB (shortness of breath)     Right ventricular enlargement     Chronic kidney disease, stage 3 (moderate) (H)     History of tobacco abuse     H/O ETOH abuse     History of CVA 3/18     Special screening for malignant neoplasms, colon     Tobacco abuse     Diastolic dysfunction grade 1 on 1/7/19     Obesity (BMI 35.0-39.9) with comorbidity (H)       History " reviewed. No pertinent surgical history.    Current Outpatient Medications   Medication     amLODIPine (NORVASC) 5 MG tablet     ASPIRIN PO     atorvastatin (LIPITOR) 40 MG tablet     Cholecalciferol (VITAMIN D-3) 1000 units CAPS     CINNAMON PO     Garlic 1000 MG CAPS     lisinopril (PRINIVIL/ZESTRIL) 40 MG tablet     metFORMIN (GLUCOPHAGE-XR) 500 MG 24 hr tablet     metoprolol tartrate (LOPRESSOR) 50 MG tablet     order for DME     tamsulosin (FLOMAX) 0.4 MG capsule     TURMERIC CURCUMIN PO     No current facility-administered medications for this visit.      Facility-Administered Medications Ordered in Other Visits   Medication     iopamidol (ISOVUE-370) solution 75 mL        No Known Allergies    Family History   Problem Relation Age of Onset     Colon Cancer Mother      Kidney Cancer Father      Kidney Disease Father      Coronary Artery Disease Brother      Pulmonary Embolism Brother      Coronary Artery Disease Brother      Myocardial Infarction Brother        Social History     Socioeconomic History     Marital status: Single     Spouse name: None     Number of children: None     Years of education: None     Highest education level: None   Occupational History     None   Social Needs     Financial resource strain: None     Food insecurity:     Worry: None     Inability: None     Transportation needs:     Medical: None     Non-medical: None   Tobacco Use     Smoking status: Current Some Day Smoker     Packs/day: 0.50     Types: Cigars     Start date: 2003     Last attempt to quit: 2018     Years since quittin.2     Smokeless tobacco: Never Used   Substance and Sexual Activity     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      Drug use: No     Sexual activity: Never   Lifestyle     Physical activity:     Days per week: None     Minutes per session: None     Stress: None   Relationships     Social connections:     Talks on phone: None     Gets together: None     Attends Jain service: None      Active member of club or organization: None     Attends meetings of clubs or organizations: None     Relationship status: None     Intimate partner violence:     Fear of current or ex partner: None     Emotionally abused: None     Physically abused: None     Forced sexual activity: None   Other Topics Concern     Parent/sibling w/ CABG, MI or angioplasty before 65F 55M? Yes   Social History Narrative     None       ROS: 10 point ROS neg other than the symptoms noted above in the HPI.  EXAM  Constitutional: healthy, alert and no distress    Psychiatric: mentation appears normal and affect normal/bright    VASCULAR:  -Dorsalis pedis pulse +1/4 b/l  -Posterior tibial pulse +1/4 b/l  -Capillary refill time < 3 seconds to b/l hallux  -Hair growth Absent to b/l anterior legs and ankles  NEURO:  -Protective sensation intact with SWM +7/10 RIGHT and +9/10 LEFT on 09/18/2019  -Light touch sensation intact to b/l plantar forefoot  DERM:  -Skin diffusely dry and flaking to bilateral feet  -Skin temperature within normal limits bilaterally   -Skin mildly dry to bilateral feet  -Toenails excessively elongated (> 3.0cm per nail), thickened, dystrophic and discolored x 10  ---No puncture of skin of any toes from the elongated toenails  ---Moderate subungual debris and moderate interdigital flaking of skin with no open wounds post cleaning with alcohol swab  MSK:  -Moderate decrease in arch height while patient is NWB  -Muscle strength of ankles +5/5 for dorsiflexion, plantarflexion, ABDUction and ADDuction b/l    ============================================================    ASSESSMENT:  (L60.3) Onychodystrophy  (primary encounter diagnosis)    (L85.3) Xerosis of skin    (M21.41,  M21.42) Pes planus of both feet    (E11.9) Diabetes mellitus type 2, noninsulin dependent (H)    (E11.42) Diabetic polyneuropathy associated with type 2 diabetes mellitus (H)    (Z68.35) BMI 35.0-35.9,adult    (Z86.73) History of CVA 3/18    (N18.3)  Chronic kidney disease, stage 3 (moderate) (H)        PLAN:  -Patient evaluated and examined. Treatment options discussed with no educational barriers noted.  -Diabetic Foot Education provided. This included checking the feet daily looking for new new blisters or wounds, wearing shoes at all times when walking including around the house, and avoiding lotion application between the toes. Any sign of infection in the foot warrant's the patient presenting to the ED as soon as possible.  -Nails debrided x 10 without incident  -DM foot exam for patient with sensation  -Orthotist referral for DM shoes and inserts in [place] location  -Patient in agreement with the above treatment plan and all of patient's questions were answered.      RTC 3 months for diabetic foot exam and high risk nail debridement        Leonie Og DPM    Again, thank you for allowing me to participate in the care of your patient.        Sincerely,        Leonie Og DPM

## 2019-09-18 NOTE — PROGRESS NOTES
"Chief complaint: Patient presents with:  Consult: Diabetic Foot Exam, Nail Trimming; Referred by Dr. Mendieta      History of Present Illness: This 69 year old male is seen at the request of Gayatri for evaluation and suggestions of management of elongated toenails and a diabetic foot exam. Patient has pain from the toenails when they catch on his socks, but they otherwise to not cause him pain. He used to trim his nails himself, but he has been unable to trim them recently and he says it has been a long time since they have been trimmed. He says his blood sugars have been well controlled. He gets tingling and burning sensations in both of his feet. No further pedal complaints today.       /84 (Cuff Size: Adult Large)   Temp 98.1  F (36.7  C) (Tympanic)   Ht 1.88 m (6' 2\")   Wt 133.4 kg (294 lb)   BMI 37.75 kg/m      Patient Active Problem List   Diagnosis     Benign essential hypertension     Obesity     Type 2 diabetes mellitus      Chronic fatigue     Slurred speech x 3 months, CT neg ED [facility] 4/25/18     Vision changes x 3 months as of 4/18     Chronic bilateral low back pain with sciatica     Altered gait     Altered mental status - since approx 1/18 - delayed response, change in mentation     Hypertrophic cardiomyopathy at 2.2 cm     Ascending aortic aneurysm at 4.45 cm on echo from 1/7/19     SOB (shortness of breath)     Right ventricular enlargement     Chronic kidney disease, stage 3 (moderate) (H)     History of tobacco abuse     H/O ETOH abuse     History of CVA 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 No pertinent surgical history.    Current Outpatient Medications   Medication     amLODIPine (NORVASC) 5 MG tablet     ASPIRIN PO     atorvastatin (LIPITOR) 40 MG tablet     Cholecalciferol (VITAMIN D-3) 1000 units CAPS     CINNAMON PO "     Garlic 1000 MG CAPS     lisinopril (PRINIVIL/ZESTRIL) 40 MG tablet     metFORMIN (GLUCOPHAGE-XR) 500 MG 24 hr tablet     metoprolol tartrate (LOPRESSOR) 50 MG tablet     order for DME     tamsulosin (FLOMAX) 0.4 MG capsule     TURMERIC CURCUMIN PO     No current facility-administered medications for this visit.      Facility-Administered Medications Ordered in Other Visits   Medication     iopamidol (ISOVUE-370) solution 75 mL        No Known Allergies    Family History   Problem Relation Age of Onset     Colon Cancer Mother      Kidney Cancer Father      Kidney Disease Father      Coronary Artery Disease Brother      Pulmonary Embolism Brother      Coronary Artery Disease Brother      Myocardial Infarction Brother        Social History     Socioeconomic History     Marital status: Single     Spouse name: None     Number of children: None     Years of education: None     Highest education level: None   Occupational History     None   Social Needs     Financial resource strain: None     Food insecurity:     Worry: None     Inability: None     Transportation needs:     Medical: None     Non-medical: None   Tobacco Use     Smoking status: Current Some Day Smoker     Packs/day: 0.50     Types: Cigars     Start date: 2003     Last attempt to quit: 2018     Years since quittin.2     Smokeless tobacco: Never Used   Substance and Sexual Activity     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      Drug use: No     Sexual activity: Never   Lifestyle     Physical activity:     Days per week: None     Minutes per session: None     Stress: None   Relationships     Social connections:     Talks on phone: None     Gets together: None     Attends Mandaeism service: None     Active member of club or organization: None     Attends meetings of clubs or organizations: None     Relationship status: None     Intimate partner violence:     Fear of current or ex partner: None     Emotionally abused: None     Physically  abused: None     Forced sexual activity: None   Other Topics Concern     Parent/sibling w/ CABG, MI or angioplasty before 65F 55M? Yes   Social History Narrative     None       ROS: 10 point ROS neg other than the symptoms noted above in the HPI.  EXAM  Constitutional: healthy, alert and no distress    Psychiatric: mentation appears normal and affect normal/bright    VASCULAR:  -Dorsalis pedis pulse +1/4 b/l  -Posterior tibial pulse +1/4 b/l  -Capillary refill time < 3 seconds to b/l hallux  -Hair growth Absent to b/l anterior legs and ankles  NEURO:  -Protective sensation intact with SWM +7/10 RIGHT and +9/10 LEFT on 09/18/2019  -Light touch sensation intact to b/l plantar forefoot  DERM:  -Skin diffusely dry and flaking to bilateral feet  -Skin temperature within normal limits bilaterally   -Skin mildly dry to bilateral feet  -Toenails excessively elongated (> 3.0cm per nail), thickened, dystrophic and discolored x 10  ---No puncture of skin of any toes from the elongated toenails  ---Moderate subungual debris and moderate interdigital flaking of skin with no open wounds post cleaning with alcohol swab  MSK:  -Moderate decrease in arch height while patient is NWB  -Muscle strength of ankles +5/5 for dorsiflexion, plantarflexion, ABDUction and ADDuction b/l    ============================================================    ASSESSMENT:  (L60.3) Onychodystrophy  (primary encounter diagnosis)    (L85.3) Xerosis of skin    (M21.41,  M21.42) Pes planus of both feet    (E11.9) Diabetes mellitus type 2, noninsulin dependent (H)    (E11.42) Diabetic polyneuropathy associated with type 2 diabetes mellitus (H)    (Z68.35) BMI 35.0-35.9,adult    (Z86.73) History of CVA 3/18    (N18.3) Chronic kidney disease, stage 3 (moderate) (H)        PLAN:  -Patient evaluated and examined. Treatment options discussed with no educational barriers noted.  -Diabetic Foot Education provided. This included checking the feet daily looking for  new new blisters or wounds, wearing shoes at all times when walking including around the house, and avoiding lotion application between the toes. Any sign of infection in the foot warrant's the patient presenting to the ED as soon as possible.  -Nails debrided x 10 without incident  -DM foot exam for patient with sensation  -Orthotist referral for DM shoes and inserts in [place] location  -Patient in agreement with the above treatment plan and all of patient's questions were answered.      RTC 3 months for diabetic foot exam and high risk nail debridement        Leonie Og DPM

## 2019-09-19 ENCOUNTER — DOCUMENTATION ONLY (OUTPATIENT)
Dept: CARE COORDINATION | Facility: CLINIC | Age: 69
End: 2019-09-19

## 2019-10-16 ENCOUNTER — OFFICE VISIT (OUTPATIENT)
Dept: NEUROLOGY | Facility: CLINIC | Age: 69
End: 2019-10-16
Payer: COMMERCIAL

## 2019-10-16 VITALS
DIASTOLIC BLOOD PRESSURE: 83 MMHG | HEIGHT: 75 IN | RESPIRATION RATE: 17 BRPM | SYSTOLIC BLOOD PRESSURE: 144 MMHG | OXYGEN SATURATION: 98 % | WEIGHT: 290 LBS | HEART RATE: 63 BPM | BODY MASS INDEX: 36.06 KG/M2

## 2019-10-16 DIAGNOSIS — R93.0 ABNORMAL MAGNETIC RESONANCE IMAGING OF HEAD: Primary | ICD-10-CM

## 2019-10-16 ASSESSMENT — MIFFLIN-ST. JEOR: SCORE: 2163.12

## 2019-10-16 ASSESSMENT — PAIN SCALES - GENERAL: PAINLEVEL: NO PAIN (0)

## 2019-10-16 NOTE — NURSING NOTE
Chief Complaint   Patient presents with     RECHECK     Abnormal MRI hx of CVA     Medications reviewed and vital signs taken.   Bg Zayas, CMA

## 2019-10-16 NOTE — LETTER
10/16/2019       RE: Deon Culver  [address]  [facility] [address]     Dear Colleague,    Thank you for referring your patient, Deon Culver, to the [facility] NEUROLOGY at [facility]. Please see a copy of my visit note below.    Service Date: 10/16/2019      Sreedhar Mendieta DO   [facility]   [address] [place]  [address]      RE: Deon Culver   MRN: [id number]   : 1950      Dear Dr. Mendieta:      This is in regard to followup on Deon Culver.  The patient returned today with a chief complaint of imbalance, dysarthria, low back and leg discomfort.      The patient was accompanied again by his wife, Janiya, and his daughter.  He has continued to improve since he first had symptoms.  His balance is definitely better.  He has had improvement in his speech.  He no longer has lower back or leg discomfort.  He feels strong.  He assured me he did not think he would have any trouble getting off the floor if he had to.  He has had no trouble going up and down steps or getting out of a chair.  They all agreed that his voice is stronger.  He has had no trouble swallowing or chewing.  He denied headache nor visual problems.  He is not using a gait assist device such as a cane.  He has not suffered any falls.  The patient has stayed away in general from alcohol.  He has rarely used wine.  His family corroborated this.  His diabetes is under better control.  His last A1C hemoglobin was 6.  He is due to see the cardiologist in January for his findings of asymmetric septal hypertrophy as well as his aortic aneurysm.  The patient's family did review with me his history including the findings of an abnormality in the pontomedullary junction.  This was suspected to be a stroke, but it was unchanged on his last MRI scan.  I suggested he have another one now to ensure stability.  The patient has noted a mild tremor of his hands.  He questioned  the significance of this.  I talked with him about essential tremor.  The patient and his family assured me he has had no trouble passing his urine or stool and nothing to suggest incontinence or any cognitive decline.  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      I did review his last MRI scan of the brain done on 10/04/2018 and its comparison to the previous one on 06/12/2018.  This study is going to be repeated now probably in [place] to assure stability.  His daughter promised to call me once the study is done so I can review the results.      I reviewed your records and your recent visit from 08/28/2019 with them.  The patient and his family did also review his current medications including dosages available through the Epic website.  His MRI scan of the lumbar spine was also reviewed with them and they are aware of the findings.  At the L4-L5 level, he has spondylolisthesis and ligamentous flavum hypertrophy resulting in some compression of both L5 nerve roots in the lateral recesses.  He also at the L3-L4 level has ligamentum flavum hypertrophy and a small synovial cyst compressing the right L4 nerve root.       I did discuss the need to have occasional B12 levels checked taking metformin and did go over its use the possible 40% of patients who develop low B12 level taking it.      Neurologic examination revealed a pleasant man.  His blood pressure was initially recorded by our medical assistant at 144/83 with a pulse of 63.  Using a large soft cuff, I recorded it at 136/70 with a pulse of 78.  He had a regular cardiac rhythm without gallops or murmurs.  His lungs were clear to auscultation. He did not have cervical bruits.  He could get out of a chair with his arms folded.  He walked with a slightly wide-based gait.  The patient had negative Romberg.  He almost could do tandem.  The patient does have a mild tremor that is postural of his hands and  without extrapyramidal findings.  He had normal fluent speech and did not have significant dysarthria.  He had a negative glabellar tap sign and good extraocular movements.  Facial motor strength was all unremarkable.  The patient had no crossed sensory findings to pinprick.  He had no dysmetria and had normal strength.  The patient did not have any pathologic reflexes noted.  I reassured the patient on his physical examination as well as his family had no signs of Ottoniel syndrome.      IMPRESSION:     1.  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   2.  Spondylolisthesis with bilateral L4-L5 radicular complaints which seems to be quiescent.      I reassured the patient and his family that his examination seems to be stable.  I went over findings that are typical in a pontomedullary stroke and he really has none on exam.  He earlier had trouble with balance and had dysarthria.  It is hard to say when that event occurred based on his history.  I think he should have a followup MRI scan, though, to assure stability.  I think it would be prudent to have neurologic followup here at [facility] probably in the next year and on a p.r.n. basis.  He and his family understand I am going to retire at the end of the year.  I did suggest again that he continue to address his vascular risk factors carefully.  I encouraged him to continue to stay away from alcohol.  Again, I went over warning signs of stroke with the patient and his family and the need to have immediate evaluation in the hospital if he has any new complaints.      Thank you for allowing me to see this patient.      Sincerely,      Jean-Claude Hopkins MD      I spent 30 minutes with the patient.  Over 50% of the time this involved counseling and coordination of care.         D: 10/19/2019   T: 10/21/2019   MT: laly      Name:     BETO BERRY   MRN:      [id number]        Account:      [id number]   :      1950           Service Date: 10/16/2019      Document:  [id number]

## 2019-10-21 NOTE — PROGRESS NOTES
Service Date: 10/16/2019      Sreedhar Mendieta, DO   [facility]   [address] [place]  [address]      RE: Deon Culver   MRN: [id number]   : 1950      Dear Dr. Mendieta:      This is in regard to followup on Deon Culver.  The patient returned today with a chief complaint of imbalance, dysarthria, low back and leg discomfort.      The patient was accompanied again by his wife, Janiya, and his daughter.  He has continued to improve since he first had symptoms.  His balance is definitely better.  He has had improvement in his speech.  He no longer has lower back or leg discomfort.  He feels strong.  He assured me he did not think he would have any trouble getting off the floor if he had to.  He has had no trouble going up and down steps or getting out of a chair.  They all agreed that his voice is stronger.  He has had no trouble swallowing or chewing.  He denied headache nor visual problems.  He is not using a gait assist device such as a cane.  He has not suffered any falls.  The patient has stayed away in general from alcohol.  He has rarely used wine.  His family corroborated this.  His diabetes is under better control.  His last A1C hemoglobin was 6.  He is due to see the cardiologist in January for his findings of asymmetric septal hypertrophy as well as his aortic aneurysm.  The patient's family did review with me his history including the findings of an abnormality in the pontomedullary junction.  This was suspected to be a stroke, but it was unchanged on his last MRI scan.  I suggested he have another one now to ensure stability.  The patient has noted a mild tremor of his hands.  He questioned the significance of this.  I talked with him about essential tremor.  The patient and his family assured me he has had no trouble passing his urine or stool and nothing to suggest incontinence or any cognitive decline.  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      I did review his last MRI scan of the brain done on 10/04/2018 and its comparison to the previous one on 06/12/2018.  This study is going to be repeated now probably in [place] to assure stability.  His daughter promised to call me once the study is done so I can review the results.      I reviewed your records and your recent visit from 08/28/2019 with them.  The patient and his family did also review his current medications including dosages available through the Epic website.  His MRI scan of the lumbar spine was also reviewed with them and they are aware of the findings.  At the L4-L5 level, he has spondylolisthesis and ligamentous flavum hypertrophy resulting in some compression of both L5 nerve roots in the lateral recesses.  He also at the L3-L4 level has ligamentum flavum hypertrophy and a small synovial cyst compressing the right L4 nerve root.       I did discuss the need to have occasional B12 levels checked taking metformin and did go over its use the possible 40% of patients who develop low B12 level taking it.      Neurologic examination revealed a pleasant man.  His blood pressure was initially recorded by our medical assistant at 144/83 with a pulse of 63.  Using a large soft cuff, I recorded it at 136/70 with a pulse of 78.  He had a regular cardiac rhythm without gallops or murmurs.  His lungs were clear to auscultation. He did not have cervical bruits.  He could get out of a chair with his arms folded.  He walked with a slightly wide-based gait.  The patient had negative Romberg.  He almost could do tandem.  The patient does have a mild tremor that is postural of his hands and without extrapyramidal findings.  He had normal fluent speech and did not have significant dysarthria.  He had a negative glabellar tap sign and good extraocular movements.  Facial motor strength was all unremarkable.  The patient had no crossed sensory findings to  pinprick.  He had no dysmetria and had normal strength.  The patient did not have any pathologic reflexes noted.  I reassured the patient on his physical examination as well as his family had no signs of Ottoniel syndrome.      IMPRESSION:     1.  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   2.  Spondylolisthesis with bilateral L4-L5 radicular complaints which seems to be quiescent.      I reassured the patient and his family that his examination seems to be stable.  I went over findings that are typical in a pontomedullary stroke and he really has none on exam.  He earlier had trouble with balance and had dysarthria.  It is hard to say when that event occurred based on his history.  I think he should have a followup MRI scan, though, to assure stability.  I think it would be prudent to have neurologic followup here at [facility] probably in the next year and on a p.r.n. basis.  He and his family understand I am going to retire at the end of the year.  I did suggest again that he continue to address his vascular risk factors carefully.  I encouraged him to continue to stay away from alcohol.  Again, I went over warning signs of stroke with the patient and his family and the need to have immediate evaluation in the hospital if he has any new complaints.      Thank you for allowing me to see this patient.      Sincerely,      Kelli Hopkins MD      I spent 30 minutes with the patient.  Over 50% of the time this involved counseling and coordination of care.         KELLI HOPKINS MD             D: 10/19/2019   T: 10/21/2019   MT: laly      Name:     BETO BERRY   MRN:      [id number]        Account:      [id number]   :      1950           Service Date: 10/16/2019      Document: [id number]

## 2019-10-28 ENCOUNTER — HOSPITAL ENCOUNTER (OUTPATIENT)
Dept: MRI IMAGING | Facility: HOSPITAL | Age: 69
Discharge: HOME | End: 2019-10-28
Attending: PSYCHIATRY & NEUROLOGY | Admitting: PSYCHIATRY & NEUROLOGY
Payer: COMMERCIAL

## 2019-10-28 DIAGNOSIS — R93.0 ABNORMAL MAGNETIC RESONANCE IMAGING OF HEAD: ICD-10-CM

## 2019-10-28 PROCEDURE — 70551 MRI BRAIN STEM W/O DYE: CPT | Mod: TC

## 2019-10-30 ENCOUNTER — TELEPHONE (OUTPATIENT)
Dept: NEUROLOGY | Facility: CLINIC | Age: 69
End: 2019-10-30

## 2019-10-30 NOTE — TELEPHONE ENCOUNTER
I notified pt that per Dr. Hopkins his MRI showed no change. good news. I let pt know to follow up with neurology again in one year.     Peggy Pearce RN

## 2019-11-06 DIAGNOSIS — E11.8 TYPE 2 DIABETES MELLITUS WITH COMPLICATION, WITHOUT LONG-TERM CURRENT USE OF INSULIN (H): ICD-10-CM

## 2019-11-06 NOTE — TELEPHONE ENCOUNTER
metFORMIN (GLUCOPHAGE-XR) 500 MG 24 hr tablet  Last visit date with prescribing provider: 8-  Last refill date: 8-  Quantity: 360, Refills: 3    Catie Ramirez LPN      Next 5 appointments (look out 90 days)    Oliver 15, 2020  2:30 PM CST  (Arrive by 2:15 PM)  Return Visit with Leonie Og DPM  [facility] ([facility] ) [address] [place] [id number] [phone]   Jan 17, 2020  4:00 PM CST  (Arrive by 3:45 PM)  Return Visit with Chas Armendariz DO  [facility] ([facility] ) [address]  [place] [address]  [phone]

## 2019-11-07 RX ORDER — METFORMIN HCL 500 MG
TABLET, EXTENDED RELEASE 24 HR ORAL
Qty: 360 TABLET | Refills: 0 | Status: SHIPPED | OUTPATIENT
Start: 2019-11-07 | End: 2020-01-17 | Stop reason: ALTCHOICE

## 2019-11-12 ENCOUNTER — MEDICAL CORRESPONDENCE (OUTPATIENT)
Dept: HEALTH INFORMATION MANAGEMENT | Facility: CLINIC | Age: 69
End: 2019-11-12

## 2019-12-31 NOTE — PROGRESS NOTES
Subjective     Deon Culver is a 69 year old male who presents to clinic today for the following health issues:    HPI   Diabetes Follow-up      How often are you checking your blood sugar? Not at all    What concerns do you have today about your diabetes? None     Do you have any of these symptoms? (Select all that apply)  Numbness in feet     Have you had a diabetic eye exam in the last 12 months? Yes- Date of last eye exam: November     Diabetes Management Resources    Hyperlipidemia Follow-Up      Are you regularly taking any medication or supplement to lower your cholesterol?   Yes- Lipitor    Are you having muscle aches or other side effects that you think could be caused by your cholesterol lowering medication?  No    Hypertension Follow-up      Do you check your blood pressure regularly outside of the clinic? Yes     Are you following a low salt diet? No    Are your blood pressures ever more than 140 on the top number (systolic) OR more   than 90 on the bottom number (diastolic), for example 140/90? No    BP Readings from Last 2 Encounters:   01/06/20 128/87   10/16/19 (!) 144/83     Hemoglobin A1C (%)   Date Value   08/28/2019 6.0 (H)   03/19/2019 6.0 (H)     LDL Cholesterol Calculated (mg/dL)   Date Value   11/12/2018 38   04/30/2018 86       Chronic Kidney Disease Follow-up      Do you take any over the counter pain medicine?: No    Mendoza presents today for follow up.  Mendoza has a history of CVA, Diabetes, HTN and Hyperlipidemia.  He does have follow up with cardiology for his Cardiomyopathy with upcoming echo.    Mendoza denies any chest pain of SOB.  He is due for the Flu shot and PNA vaccine today.  He otherwise has no complaints today.  He is not fasting today.        Patient Active Problem List   Diagnosis     Benign essential hypertension     Obesity     Type 2 diabetes mellitus      Chronic fatigue     Slurred speech x 3 months, CT neg ED [facility] 4/25/18     Vision changes x 3 months as of 4/18      Chronic bilateral low back pain with sciatica     Altered gait     Altered mental status - since approx  - delayed response, change in mentation     Hypertrophic cardiomyopathy at 2.2 cm     Ascending aortic aneurysm at 4.45 cm on echo from 19     SOB (shortness of breath)     Right ventricular enlargement     Chronic kidney disease, stage 3 (moderate) (H)     History of tobacco abuse     H/O ETOH abuse     History of CVA 3/18     Special screening for malignant neoplasms, colon     Tobacco abuse     Diastolic dysfunction grade 1 on 19     Obesity (BMI 35.0-39.9) with comorbidity (H)     History reviewed. No pertinent surgical history.    Social History     Tobacco Use     Smoking status: Current Some Day Smoker     Packs/day: 0.50     Types: Cigars     Start date: 2003     Last attempt to quit: 2018     Years since quittin.5     Smokeless tobacco: Never Used   Substance Use Topics     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      Family History   Problem Relation Age of Onset     Colon Cancer Mother      Kidney Cancer Father      Kidney Disease Father      Coronary Artery Disease Brother      Pulmonary Embolism Brother      Coronary Artery Disease Brother      Myocardial Infarction Brother          Current Outpatient Medications   Medication Sig Dispense Refill     amLODIPine (NORVASC) 5 MG tablet Take 1 tablet (5 mg) by mouth daily 90 tablet 3     ASPIRIN PO Take 81 mg by mouth daily       atorvastatin (LIPITOR) 40 MG tablet Take 1 tablet (40 mg) by mouth daily 90 tablet 3     Cholecalciferol (VITAMIN D-3) 1000 units CAPS Take 1,000 Units by mouth       CINNAMON PO Take 1,000 mg by mouth       Garlic 1000 MG CAPS        lisinopril (PRINIVIL/ZESTRIL) 40 MG tablet Take 1 tablet (40 mg) by mouth daily 90 tablet 3     metFORMIN (GLUCOPHAGE-XR) 500 MG 24 hr tablet TAKE 2 TABLETS(1000 MG) BY MOUTH TWICE DAILY WITH MEALS 360 tablet 0     metFORMIN (GLUCOPHAGE-XR) 500 MG 24 hr tablet TAKE 2  TABLETS(1000 MG) BY MOUTH TWICE DAILY WITH MEALS 360 tablet 3     metoprolol tartrate (LOPRESSOR) 50 MG tablet TAKE 1 TABLET BY MOUTH TWICE DAILY 60 tablet 3     metoprolol tartrate (LOPRESSOR) 50 MG tablet Take 1 tablet (50 mg) by mouth 2 times daily 60 tablet 3     order for DME Equipment being ordered: Stationary Bike 1 Device 0     tamsulosin (FLOMAX) 0.4 MG capsule Take 1 capsule (0.4 mg) by mouth daily 90 capsule 3     TURMERIC CURCUMIN PO Take 1,500 mg by mouth       No Known Allergies  Recent Labs   Lab Test 09/04/19  1504 08/28/19  1540 03/19/19  1424 11/12/18  1608  04/30/18  1048   A1C  --  6.0* 6.0* 5.7*   < >  --    LDL  --   --   --  38  --  86   HDL  --   --   --  53  --  50   TRIG  --   --   --  113  --  196*   ALT  --  28 21 26   < >  --    CR 1.59* 1.78* 1.74* 1.54*   < >  --    GFRESTIMATED 44* 38* 39* 45*   < >  --    GFRESTBLACK 51* 44* 46* 55*   < >  --    POTASSIUM 4.8 4.2 4.6 4.4   < >  --    TSH  --   --   --   --   --  1.43    < > = values in this interval not displayed.      BP Readings from Last 3 Encounters:   01/06/20 128/87   10/16/19 (!) 144/83   09/18/19 132/84    Wt Readings from Last 3 Encounters:   01/06/20 137 kg (302 lb)   10/16/19 131.5 kg (290 lb)   09/18/19 133.4 kg (294 lb)               Reviewed and updated as needed this visit by Provider         Review of Systems   ROS COMP: Constitutional, HEENT, cardiovascular, pulmonary, gi and gu systems are negative, except as otherwise noted.      Objective    /87 (BP Location: Left arm, Patient Position: Chair, Cuff Size: Adult Large)   Pulse 74   Temp 96.7  F (35.9  C) (Tympanic)   Wt 137 kg (302 lb)   SpO2 96%   BMI 38.26 kg/m    Body mass index is 38.26 kg/m .  Physical Exam   GENERAL: healthy, alert and no distress  RESP: lungs clear to auscultation - no rales, rhonchi or wheezes  CV: regular rate and rhythm, normal S1 S2, no S3 or S4, no murmur, click or rub, no peripheral edema and peripheral pulses strong  MS: no  gross musculoskeletal defects noted, no edema  SKIN: no suspicious lesions or rashes  NEURO: Normal strength and tone, mentation intact and speech normal  PSYCH: mentation appears normal, affect normal/bright    Diagnostic Test Results:  Pending future labs        Assessment & Plan   Problem List Items Addressed This Visit        Endocrine    Type 2 diabetes mellitus     Relevant Orders    Hemoglobin A1c    TSH with free T4 reflex       Circulatory    Benign essential hypertension - Primary    Relevant Orders    Lipid Profile    Comprehensive metabolic panel    Hypertrophic cardiomyopathy at 2.2 cm    Diastolic dysfunction grade 1 on 1/7/19       Urinary    Chronic kidney disease, stage 3 (moderate) (H)       Other    History of CVA 3/18    Relevant Orders    CBC with platelets differential             Sreedhar Mendieta DO  [facility]

## 2020-01-06 ENCOUNTER — OFFICE VISIT (OUTPATIENT)
Dept: INTERNAL MEDICINE | Facility: OTHER | Age: 70
End: 2020-01-06
Attending: INTERNAL MEDICINE
Payer: COMMERCIAL

## 2020-01-06 VITALS
BODY MASS INDEX: 38.26 KG/M2 | DIASTOLIC BLOOD PRESSURE: 87 MMHG | TEMPERATURE: 96.7 F | SYSTOLIC BLOOD PRESSURE: 128 MMHG | WEIGHT: 302 LBS | OXYGEN SATURATION: 96 % | HEART RATE: 74 BPM

## 2020-01-06 DIAGNOSIS — Z23 NEED FOR PROPHYLACTIC VACCINATION AND INOCULATION AGAINST INFLUENZA: ICD-10-CM

## 2020-01-06 DIAGNOSIS — N18.30 CHRONIC KIDNEY DISEASE, STAGE 3 (MODERATE): ICD-10-CM

## 2020-01-06 DIAGNOSIS — I42.2 HYPERTROPHIC CARDIOMYOPATHY (H): ICD-10-CM

## 2020-01-06 DIAGNOSIS — I10 BENIGN ESSENTIAL HYPERTENSION: Primary | ICD-10-CM

## 2020-01-06 DIAGNOSIS — I51.89 DIASTOLIC DYSFUNCTION: ICD-10-CM

## 2020-01-06 DIAGNOSIS — Z86.73 HISTORY OF CVA (CEREBROVASCULAR ACCIDENT): ICD-10-CM

## 2020-01-06 DIAGNOSIS — E11.8 TYPE 2 DIABETES MELLITUS WITH COMPLICATION, WITHOUT LONG-TERM CURRENT USE OF INSULIN (H): ICD-10-CM

## 2020-01-06 PROCEDURE — 90732 PPSV23 VACC 2 YRS+ SUBQ/IM: CPT

## 2020-01-06 PROCEDURE — G0008 ADMIN INFLUENZA VIRUS VAC: HCPCS | Performed by: INTERNAL MEDICINE

## 2020-01-06 PROCEDURE — G0009 ADMIN PNEUMOCOCCAL VACCINE: HCPCS

## 2020-01-06 PROCEDURE — 90662 IIV NO PRSV INCREASED AG IM: CPT

## 2020-01-06 PROCEDURE — G0463 HOSPITAL OUTPT CLINIC VISIT: HCPCS

## 2020-01-06 PROCEDURE — 99214 OFFICE O/P EST MOD 30 MIN: CPT | Performed by: INTERNAL MEDICINE

## 2020-01-06 PROCEDURE — G0463 HOSPITAL OUTPT CLINIC VISIT: HCPCS | Mod: 25

## 2020-01-06 ASSESSMENT — PAIN SCALES - GENERAL: PAINLEVEL: NO PAIN (0)

## 2020-01-06 NOTE — NURSING NOTE
"Chief Complaint   Patient presents with     Hypertension     Diabetes     Lipids       Initial /87 (BP Location: Left arm, Patient Position: Chair, Cuff Size: Adult Large)   Pulse 74   Temp 96.7  F (35.9  C) (Tympanic)   Wt 137 kg (302 lb)   SpO2 96%   BMI 38.26 kg/m   Estimated body mass index is 38.26 kg/m  as calculated from the following:    Height as of 10/16/19: 1.892 m (6' 2.5\").    Weight as of this encounter: 137 kg (302 lb).  Medication Reconciliation: complete  EMELYN SEALS LPN  "

## 2020-01-13 ENCOUNTER — HOSPITAL ENCOUNTER (OUTPATIENT)
Dept: CARDIOLOGY | Facility: HOSPITAL | Age: 70
Discharge: HOME | End: 2020-01-13
Attending: INTERNAL MEDICINE | Admitting: INTERNAL MEDICINE
Payer: COMMERCIAL

## 2020-01-13 DIAGNOSIS — I71.21 ASCENDING AORTIC ANEURYSM (H): ICD-10-CM

## 2020-01-13 DIAGNOSIS — I42.2 HYPERTROPHIC CARDIOMYOPATHY (H): ICD-10-CM

## 2020-01-13 DIAGNOSIS — I51.89 DIASTOLIC DYSFUNCTION: ICD-10-CM

## 2020-01-13 DIAGNOSIS — I10 BENIGN ESSENTIAL HYPERTENSION: ICD-10-CM

## 2020-01-13 PROCEDURE — 93306 TTE W/DOPPLER COMPLETE: CPT | Mod: TC

## 2020-01-13 PROCEDURE — 93306 TTE W/DOPPLER COMPLETE: CPT | Mod: 26 | Performed by: INTERNAL MEDICINE

## 2020-01-15 ENCOUNTER — OFFICE VISIT (OUTPATIENT)
Dept: PODIATRY | Facility: OTHER | Age: 70
End: 2020-01-15
Attending: PODIATRIST
Payer: COMMERCIAL

## 2020-01-15 VITALS
RESPIRATION RATE: 16 BRPM | TEMPERATURE: 96.2 F | WEIGHT: 292 LBS | SYSTOLIC BLOOD PRESSURE: 122 MMHG | DIASTOLIC BLOOD PRESSURE: 82 MMHG | BODY MASS INDEX: 36.99 KG/M2 | HEART RATE: 63 BPM | OXYGEN SATURATION: 98 %

## 2020-01-15 DIAGNOSIS — E11.42 DIABETIC POLYNEUROPATHY ASSOCIATED WITH TYPE 2 DIABETES MELLITUS (H): ICD-10-CM

## 2020-01-15 DIAGNOSIS — N18.30 CHRONIC KIDNEY DISEASE, STAGE 3 (MODERATE): ICD-10-CM

## 2020-01-15 DIAGNOSIS — L85.3 XEROSIS OF SKIN: ICD-10-CM

## 2020-01-15 DIAGNOSIS — Z86.73 HISTORY OF CVA (CEREBROVASCULAR ACCIDENT): ICD-10-CM

## 2020-01-15 DIAGNOSIS — L60.3 ONYCHODYSTROPHY: Primary | ICD-10-CM

## 2020-01-15 DIAGNOSIS — I10 BENIGN ESSENTIAL HYPERTENSION: ICD-10-CM

## 2020-01-15 DIAGNOSIS — E11.8 TYPE 2 DIABETES MELLITUS WITH COMPLICATION (H): ICD-10-CM

## 2020-01-15 DIAGNOSIS — E11.9 DIABETES MELLITUS TYPE 2, NONINSULIN DEPENDENT (H): ICD-10-CM

## 2020-01-15 DIAGNOSIS — M21.42 PES PLANUS OF BOTH FEET: ICD-10-CM

## 2020-01-15 DIAGNOSIS — M21.41 PES PLANUS OF BOTH FEET: ICD-10-CM

## 2020-01-15 DIAGNOSIS — E11.8 TYPE 2 DIABETES MELLITUS WITH COMPLICATION, WITHOUT LONG-TERM CURRENT USE OF INSULIN (H): ICD-10-CM

## 2020-01-15 DIAGNOSIS — I10 HYPERTENSION, UNSPECIFIED TYPE: ICD-10-CM

## 2020-01-15 LAB
ALBUMIN SERPL-MCNC: 4.1 G/DL (ref 3.4–5)
ALP SERPL-CCNC: 73 U/L (ref 40–150)
ALT SERPL W P-5'-P-CCNC: 39 U/L (ref 0–70)
ANION GAP SERPL CALCULATED.3IONS-SCNC: 9 MMOL/L (ref 3–14)
AST SERPL W P-5'-P-CCNC: 14 U/L (ref 0–45)
BASOPHILS # BLD AUTO: 0.1 10E9/L (ref 0–0.2)
BASOPHILS NFR BLD AUTO: 0.7 %
BILIRUB SERPL-MCNC: 0.6 MG/DL (ref 0.2–1.3)
BUN SERPL-MCNC: 40 MG/DL (ref 7–30)
CALCIUM SERPL-MCNC: 9.7 MG/DL (ref 8.5–10.1)
CHLORIDE SERPL-SCNC: 106 MMOL/L (ref 94–109)
CHOLEST SERPL-MCNC: 97 MG/DL
CO2 SERPL-SCNC: 23 MMOL/L (ref 20–32)
CREAT SERPL-MCNC: 1.57 MG/DL (ref 0.66–1.25)
DIFFERENTIAL METHOD BLD: NORMAL
EOSINOPHIL # BLD AUTO: 0.2 10E9/L (ref 0–0.7)
EOSINOPHIL NFR BLD AUTO: 3 %
ERYTHROCYTE [DISTWIDTH] IN BLOOD BY AUTOMATED COUNT: 12.2 % (ref 10–15)
EST. AVERAGE GLUCOSE BLD GHB EST-MCNC: 137 MG/DL
GFR SERPL CREATININE-BSD FRML MDRD: 44 ML/MIN/{1.73_M2}
GLUCOSE SERPL-MCNC: 113 MG/DL (ref 70–99)
HBA1C MFR BLD: 6.4 % (ref 0–5.6)
HCT VFR BLD AUTO: 44.1 % (ref 40–53)
HDLC SERPL-MCNC: 52 MG/DL
HGB BLD-MCNC: 15.2 G/DL (ref 13.3–17.7)
LDLC SERPL CALC-MCNC: 33 MG/DL
LYMPHOCYTES # BLD AUTO: 2.3 10E9/L (ref 0.8–5.3)
LYMPHOCYTES NFR BLD AUTO: 31.1 %
MCH RBC QN AUTO: 32.3 PG (ref 26.5–33)
MCHC RBC AUTO-ENTMCNC: 34.5 G/DL (ref 31.5–36.5)
MCV RBC AUTO: 94 FL (ref 78–100)
MONOCYTES # BLD AUTO: 0.5 10E9/L (ref 0–1.3)
MONOCYTES NFR BLD AUTO: 6.3 %
NEUTROPHILS # BLD AUTO: 4.4 10E9/L (ref 1.6–8.3)
NEUTROPHILS NFR BLD AUTO: 58.9 %
NONHDLC SERPL-MCNC: 45 MG/DL
PLATELET # BLD AUTO: 179 10E9/L (ref 150–450)
POTASSIUM SERPL-SCNC: 4.6 MMOL/L (ref 3.4–5.3)
PROT SERPL-MCNC: 7.6 G/DL (ref 6.8–8.8)
RBC # BLD AUTO: 4.71 10E12/L (ref 4.4–5.9)
SODIUM SERPL-SCNC: 138 MMOL/L (ref 133–144)
TRIGL SERPL-MCNC: 62 MG/DL
TSH SERPL DL<=0.005 MIU/L-ACNC: 1.53 MU/L (ref 0.4–4)
WBC # BLD AUTO: 7.4 10E9/L (ref 4–11)

## 2020-01-15 PROCEDURE — 85025 COMPLETE CBC W/AUTO DIFF WBC: CPT | Mod: ZL | Performed by: INTERNAL MEDICINE

## 2020-01-15 PROCEDURE — 40000788 ZZHCL STATISTIC ESTIMATED AVERAGE GLUCOSE: Mod: ZL | Performed by: INTERNAL MEDICINE

## 2020-01-15 PROCEDURE — 84443 ASSAY THYROID STIM HORMONE: CPT | Mod: ZL | Performed by: INTERNAL MEDICINE

## 2020-01-15 PROCEDURE — G0463 HOSPITAL OUTPT CLINIC VISIT: HCPCS

## 2020-01-15 PROCEDURE — 80061 LIPID PANEL: CPT | Mod: ZL | Performed by: INTERNAL MEDICINE

## 2020-01-15 PROCEDURE — 99213 OFFICE O/P EST LOW 20 MIN: CPT | Mod: 25 | Performed by: PODIATRIST

## 2020-01-15 PROCEDURE — G0463 HOSPITAL OUTPT CLINIC VISIT: HCPCS | Mod: 25

## 2020-01-15 PROCEDURE — 80053 COMPREHEN METABOLIC PANEL: CPT | Mod: ZL | Performed by: INTERNAL MEDICINE

## 2020-01-15 PROCEDURE — 83036 HEMOGLOBIN GLYCOSYLATED A1C: CPT | Mod: ZL | Performed by: INTERNAL MEDICINE

## 2020-01-15 PROCEDURE — 36415 COLL VENOUS BLD VENIPUNCTURE: CPT | Mod: ZL | Performed by: INTERNAL MEDICINE

## 2020-01-15 PROCEDURE — 11721 DEBRIDE NAIL 6 OR MORE: CPT | Performed by: PODIATRIST

## 2020-01-15 ASSESSMENT — PAIN SCALES - GENERAL: PAINLEVEL: NO PAIN (0)

## 2020-01-15 NOTE — NURSING NOTE
"Chief Complaint   Patient presents with     Toenail     trimming       Initial /82 (BP Location: Left arm, Patient Position: Sitting, Cuff Size: Adult Regular)   Pulse 63   Temp 96.2  F (35.7  C)   Resp 16   Wt 132.5 kg (292 lb)   SpO2 98%   BMI 36.99 kg/m   Estimated body mass index is 36.99 kg/m  as calculated from the following:    Height as of 10/16/19: 1.892 m (6' 2.5\").    Weight as of this encounter: 132.5 kg (292 lb).  Medication Reconciliation: complete  Cailin Kaur LPN  "

## 2020-01-15 NOTE — PROGRESS NOTES
Chief complaint: Patient presents with:  Toenail: trimming      History of Present Illness: This 69 year old NIDDM male is seen for follow-up management of elongated toenails and a diabetic foot exam.     He used to trim his nails himself, but he has been unable to trim them recently and he says it has been a long time since they have been trimmed. He says his blood sugars have been well controlled. He gets tingling and burning sensations in both of his feet but today he said he has never had burning with only slight numbness every once in a while. He does not think his nails are very long so he would like to extend his appointments out. No further pedal complaints today.       /82 (BP Location: Left arm, Patient Position: Sitting, Cuff Size: Adult Regular)   Pulse 63   Temp 96.2  F (35.7  C)   Resp 16   Wt 132.5 kg (292 lb)   SpO2 98%   BMI 36.99 kg/m      Patient Active Problem List   Diagnosis     Benign essential hypertension     Obesity     Type 2 diabetes mellitus      Chronic fatigue     Slurred speech x 3 months, CT neg ED [facility] 4/25/18     Vision changes x 3 months as of 4/18     Chronic bilateral low back pain with sciatica     Altered gait     Altered mental status - since approx 1/18 - delayed response, change in mentation     Hypertrophic cardiomyopathy at 2.2 cm     Ascending aortic aneurysm at 4.45 cm on echo from 1/7/19     SOB (shortness of breath)     Right ventricular enlargement     Chronic kidney disease, stage 3 (moderate) (H)     History of tobacco abuse     H/O ETOH abuse     History of CVA 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 No pertinent surgical history.    Current Outpatient Medications   Medication     amLODIPine (NORVASC) 5 MG tablet     ASPIRIN PO     atorvastatin (LIPITOR) 40 MG tablet     Cholecalciferol (VITAMIN  D-3) 1000 units CAPS     CINNAMON PO     Garlic 1000 MG CAPS     lisinopril (PRINIVIL/ZESTRIL) 40 MG tablet     metFORMIN (GLUCOPHAGE-XR) 500 MG 24 hr tablet     metFORMIN (GLUCOPHAGE-XR) 500 MG 24 hr tablet     metoprolol tartrate (LOPRESSOR) 50 MG tablet     metoprolol tartrate (LOPRESSOR) 50 MG tablet     order for DME     tamsulosin (FLOMAX) 0.4 MG capsule     TURMERIC CURCUMIN PO     No current facility-administered medications for this visit.      Facility-Administered Medications Ordered in Other Visits   Medication     iopamidol (ISOVUE-370) solution 75 mL        No Known Allergies    Family History   Problem Relation Age of Onset     Colon Cancer Mother      Kidney Cancer Father      Kidney Disease Father      Coronary Artery Disease Brother      Pulmonary Embolism Brother      Coronary Artery Disease Brother      Myocardial Infarction Brother        Social History     Socioeconomic History     Marital status: Single     Spouse name: None     Number of children: None     Years of education: None     Highest education level: None   Occupational History     None   Social Needs     Financial resource strain: None     Food insecurity:     Worry: None     Inability: None     Transportation needs:     Medical: None     Non-medical: None   Tobacco Use     Smoking status: Current Some Day Smoker     Packs/day: 0.50     Types: Cigars     Start date: 2003     Last attempt to quit: 2018     Years since quittin.2     Smokeless tobacco: Never Used   Substance and Sexual Activity     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      Drug use: No     Sexual activity: Never   Lifestyle     Physical activity:     Days per week: None     Minutes per session: None     Stress: None   Relationships     Social connections:     Talks on phone: None     Gets together: None     Attends Confucianist service: None     Active member of club or organization: None     Attends meetings of clubs or organizations: None      Relationship status: None     Intimate partner violence:     Fear of current or ex partner: None     Emotionally abused: None     Physically abused: None     Forced sexual activity: None   Other Topics Concern     Parent/sibling w/ CABG, MI or angioplasty before 65F 55M? Yes   Social History Narrative     None       ROS: 10 point ROS neg other than the symptoms noted above in the HPI.  EXAM  Constitutional: healthy, alert and no distress    Psychiatric: mentation appears normal and affect normal/bright    VASCULAR:  -Dorsalis pedis pulse +1/4 b/l  -Posterior tibial pulse +1/4 b/l  -Capillary refill time < 3 seconds to b/l hallux  -Hair growth Absent to b/l anterior legs and ankles  NEURO:  -Protective sensation intact with SWM +8/10 RIGHT and +8/10 LEFT on 01/15/2020  -Protective sensation intact with SWM +7/10 RIGHT and +9/10 LEFT on 09/18/2019  -Light touch sensation intact to b/l plantar forefoot  DERM:  -Skin diffusely dry and flaking to bilateral feet  -Skin temperature within normal limits bilaterally   -Skin mildly dry to bilateral feet  -Toenails elongated, thickened, dystrophic and discolored x 10 with subungual debris    MSK:  -Moderate decrease in arch height while patient is NWB  -Muscle strength of ankles +5/5 for dorsiflexion, plantarflexion, ABDUction and ADDuction b/l    ============================================================    ASSESSMENT:  (L60.3) Onychodystrophy  (primary encounter diagnosis)    (L85.3) Xerosis of skin    (M21.41,  M21.42) Pes planus of both feet    (E11.9) Diabetes mellitus type 2, noninsulin dependent (H)    (E11.42) Diabetic polyneuropathy associated with type 2 diabetes mellitus (H)    (Z68.35) BMI 35.0-35.9,adult    (Z86.73) History of CVA 3/18    (N18.3) Chronic kidney disease, stage 3 (moderate) (H)        PLAN:  -Patient evaluated and examined. Treatment options discussed with no educational barriers noted.  -Diabetic Foot Education provided. This included checking  the feet daily looking for new new blisters or wounds, wearing shoes at all times when walking including around the house, and avoiding lotion application between the toes. Any sign of infection in the foot warrant's the patient presenting to the ED as soon as possible.  -Nails debrided x 10 without incident  -DM foot exam for patient with sensation  -Orthotist referral for DM shoes and inserts in [place] location was placed on 09/18/2019  -Patient in agreement with the above treatment plan and all of patient's questions were answered.      RTC 4 months for diabetic foot exam and high risk nail debridement        Leonie Og DPM

## 2020-01-17 ENCOUNTER — OFFICE VISIT (OUTPATIENT)
Dept: CARDIOLOGY | Facility: OTHER | Age: 70
End: 2020-01-17
Attending: INTERNAL MEDICINE
Payer: COMMERCIAL

## 2020-01-17 VITALS
HEIGHT: 75 IN | WEIGHT: 303 LBS | DIASTOLIC BLOOD PRESSURE: 83 MMHG | SYSTOLIC BLOOD PRESSURE: 133 MMHG | OXYGEN SATURATION: 98 % | BODY MASS INDEX: 37.67 KG/M2 | TEMPERATURE: 96.5 F | RESPIRATION RATE: 16 BRPM | HEART RATE: 57 BPM

## 2020-01-17 DIAGNOSIS — E66.01 MORBID OBESITY (H): ICD-10-CM

## 2020-01-17 DIAGNOSIS — I10 BENIGN ESSENTIAL HYPERTENSION: ICD-10-CM

## 2020-01-17 DIAGNOSIS — I51.89 DIASTOLIC DYSFUNCTION: ICD-10-CM

## 2020-01-17 DIAGNOSIS — I71.21 ASCENDING AORTIC ANEURYSM (H): Primary | ICD-10-CM

## 2020-01-17 DIAGNOSIS — I42.2 HYPERTROPHIC CARDIOMYOPATHY (H): ICD-10-CM

## 2020-01-17 DIAGNOSIS — N18.30 CHRONIC KIDNEY DISEASE, STAGE 3 (MODERATE): ICD-10-CM

## 2020-01-17 DIAGNOSIS — E78.2 MIXED HYPERLIPIDEMIA: ICD-10-CM

## 2020-01-17 DIAGNOSIS — Z86.73 HISTORY OF CVA (CEREBROVASCULAR ACCIDENT): ICD-10-CM

## 2020-01-17 DIAGNOSIS — Z87.891 HISTORY OF TOBACCO ABUSE: ICD-10-CM

## 2020-01-17 DIAGNOSIS — E11.8 TYPE 2 DIABETES MELLITUS WITH COMPLICATION, WITHOUT LONG-TERM CURRENT USE OF INSULIN (H): ICD-10-CM

## 2020-01-17 DIAGNOSIS — N40.0 BENIGN PROSTATIC HYPERPLASIA, UNSPECIFIED WHETHER LOWER URINARY TRACT SYMPTOMS PRESENT: ICD-10-CM

## 2020-01-17 DIAGNOSIS — F10.11 H/O ETOH ABUSE: ICD-10-CM

## 2020-01-17 DIAGNOSIS — I51.7 RIGHT VENTRICULAR ENLARGEMENT: ICD-10-CM

## 2020-01-17 DIAGNOSIS — R47.81 SLURRED SPEECH: ICD-10-CM

## 2020-01-17 PROCEDURE — 99214 OFFICE O/P EST MOD 30 MIN: CPT | Performed by: INTERNAL MEDICINE

## 2020-01-17 RX ORDER — METOPROLOL TARTRATE 50 MG
50 TABLET ORAL 2 TIMES DAILY
Qty: 180 TABLET | Refills: 3 | Status: SHIPPED | OUTPATIENT
Start: 2020-01-17 | End: 2020-02-12

## 2020-01-17 RX ORDER — LISINOPRIL 40 MG/1
40 TABLET ORAL DAILY
Qty: 90 TABLET | Refills: 3 | Status: SHIPPED | OUTPATIENT
Start: 2020-01-17 | End: 2020-04-29

## 2020-01-17 RX ORDER — TAMSULOSIN HYDROCHLORIDE 0.4 MG/1
0.4 CAPSULE ORAL DAILY
Qty: 90 CAPSULE | Refills: 3 | Status: SHIPPED | OUTPATIENT
Start: 2020-01-17 | End: 2020-07-15

## 2020-01-17 RX ORDER — ATORVASTATIN CALCIUM 40 MG/1
40 TABLET, FILM COATED ORAL DAILY
Qty: 90 TABLET | Refills: 3 | Status: SHIPPED | OUTPATIENT
Start: 2020-01-17 | End: 2020-11-16

## 2020-01-17 RX ORDER — AMLODIPINE BESYLATE 5 MG/1
5 TABLET ORAL DAILY
Qty: 90 TABLET | Refills: 3 | Status: SHIPPED | OUTPATIENT
Start: 2020-01-17 | End: 2020-07-15

## 2020-01-17 RX ORDER — METFORMIN HCL 500 MG
TABLET, EXTENDED RELEASE 24 HR ORAL
Qty: 360 TABLET | Refills: 3 | Status: SHIPPED | OUTPATIENT
Start: 2020-01-17 | End: 2020-11-16

## 2020-01-17 ASSESSMENT — MIFFLIN-ST. JEOR: SCORE: 2222.09

## 2020-01-17 ASSESSMENT — PAIN SCALES - GENERAL: PAINLEVEL: NO PAIN (0)

## 2020-01-17 NOTE — PROGRESS NOTES
Cardiology Progress Note     Assessment & Plan   Deon Culver is a 68 year old male who is being seen in follow-up to a visit from 1/16/19.      He is known to have HCM with a septal thickness of 2.2 cm on 1/7/19, an ascending aortic aneurysm at 44.5 mm, and diastolic dysfunction grade 1.     He previously had an echo on 6/20/18 with a septal thickness of 2.0 cm and an TAAA of 4.4 cm.      On 1/7/19, his septum was 2.2 cm, had grade 1 diastolic dysfunction, and ascending aortic aneurysm at 44.5 cm.  He has a history of a CVA with ongoing unsteadiness of gait but no dizziness in March, 2018. He is to be a heavy smoker smoking up to 7-8  cigars on a daily basis. He reports he quit smoking in April, 2018. He has hypertension with a blood pressure of 171/91 today.  At home, his blood pressure is running in the 120's/80's regularly.  He is currently on lisinopril 40 mg daily and metoprolol 50 mg twice a day.  He has not had any lower extremity edema or chest pain.  With his history of diastolic dysfunction, it has been suggested he watch his salt intake, fluid intake, and weigh himself on a daily basis.    1/17/2020:  He is doing well.  He has no heart failure symptoms.  He has essentially no lower extremity edema.  He had an echo completed on 1/13/2020 essentially unchanged from his previous echo on 1/7/2019.  We are following diastolic dysfunction grade 1, mild ascending aortic aneurysm at 4.39 cm, and HCM.  His blood pressure was elevated today but he is due to take his medications at the same time he was seen. He is to check his blood pressure at home.  If they remain elevated, he is let us know and changes can be made over the phone.    Impression:  1.  Ascending aortic aneurysm at 4.45 cm on 1/7/19.  2.  HCM a 2.2 cm and 1/7/19.  3.  H/O tobacco abuse smoking cigars quitting in April, 2018.  He smoked approximate 7 days cigars on a daily basis.  4.  Diastolic dysfunction grade 1 on 1/7/19.  5.   Hypertension.  6.  DM type 2.  7.  CKD stage III.  8.  H/O CVA in March, 2018 with what appears to be unsteady gait.    Plan:  1.  Salt restriction of 2500 mg, fluid restriction of 2 L, and daily weights.  2.  Repeat echo in 1 year secondary to HCM, TAAA, and systolic dysfunction grade 1.  3.  With his history of tobacco abuse, he was encouraged to refrain from smoking.  4.  Refill all of his medications.  This includes amlodipine 5 mg daily, Lipitor 40 mg daily, lisinopril 40 mg daily, metformin 1000 mgs twice a day, Flomax 0.4 mg daily, and metoprolol 50 mg twice a day.  5.  He is to check his blood pressures at home.  If his blood pressures are elevated, he is let us know and we will make changes.  6.  He is to increase his activity.  7.  Follow-up in 1 year or sooner with issues.    Chas Armendariz        Physical Exam   Temp: 96.5  F (35.8  C)   BP: 133/83 Pulse: 57   Resp: 16 SpO2: 98 %      Vitals:    01/17/20 1450   Weight: 137.4 kg (303 lb)     Vital Signs with Ranges  Temp:  [96.5  F (35.8  C)] 96.5  F (35.8  C)  Pulse:  [57] 57  Resp:  [16] 16  BP: (133-162)/(83-93) 133/83  SpO2:  [98 %] 98 %  ROS negative except that which was noted in the HPI.    Peripheral IV 06/20/18 Right Upper forearm (Active)   Number of days: 576       Constitutional: awake, alert, cooperative, no apparent distress, and appears stated age  Eyes: Lids and lashes normal, pupils equal, sclera clear, conjunctiva normal  ENT: Normocephalic, without obvious abnormality, atraumatic.  Respiratory: No increased work of breathing, good air exchange, clear to auscultation bilaterally, no crackles or wheezing  Cardiovascular: Normal apical impulse, regular rate and rhythm, normal S1 and S2, no S3 or S4, and no murmur noted  Musculoskeletal: Scant lower extremity pitting edema present  Neurologic: Awake and alert.  Neuropsychiatric: General: normal, calm and normal eye contact    Medications         Data   No results found for this or any  previous visit (from the past 24 hour(s)).  No results found for this or any previous visit (from the past 24 hour(s)).

## 2020-01-17 NOTE — PATIENT INSTRUCTIONS
You were seen by Dr. Armendariz, 01/17/20.     1.  Limit your salt intake to no more than 2500 mg each day.  Salt is a magnet for water, so the more salt you have in your diet, the more your body is going to retain fluid.  Things like canned and boxed foods, fast food, deli meats, potato chips, and processed cheeses are very high in sodium.     2. Limit the amount of fluids you take in to no more than 2 liters per day.  This is the equivalent of eight, 8 ounce glasses of fluids per day.  Fluids includes everything such as coffee, water, juice, milk, pop, and beer.  The more fluid you drink each day, the more your body is going to retain fluid.    3. Please monitor you weight daily.  If you experience a 2-3 pound increase in weight in  24 hours, or 5 pounds in one week. Please call the cardiology office as you may need your medications adjusted or you may need to be seen.      4. You will have an echocardiogram performed in 1 year.  This is an ultrasound of the heart, that evaluates heart function.  The hospital scheduling department will call to schedule you for this test.     5. Please continue all other medications as they have been prescribed.     6. If you develop new or worsening symptoms, please call the cardiology office as you may need to be evaluated.     You will follow up with Dr. Armendariz in 1 year.       Please call the cardiology office with problems, questions, or concerns at [phone].    If you experience chest pain, chest pressure, chest tightness, shortness of breath, fainting, lightheadedness, nausea, vomiting, or other concerning symptoms, please report to the Emergency Department or call 911. These symptoms may be emergent, and best treated in the Emergency Department.       Brigette MARQUIS RN  Cardiology   [facility]  [phone]

## 2020-02-10 DIAGNOSIS — I10 BENIGN ESSENTIAL HYPERTENSION: ICD-10-CM

## 2020-02-10 DIAGNOSIS — Z86.73 HISTORY OF CVA (CEREBROVASCULAR ACCIDENT): ICD-10-CM

## 2020-02-10 DIAGNOSIS — I71.21 ASCENDING AORTIC ANEURYSM (H): ICD-10-CM

## 2020-02-10 RX ORDER — METOPROLOL TARTRATE 50 MG
TABLET ORAL
Refills: 0 | OUTPATIENT
Start: 2020-02-10

## 2020-02-11 DIAGNOSIS — I10 BENIGN ESSENTIAL HYPERTENSION: ICD-10-CM

## 2020-02-11 DIAGNOSIS — Z86.73 HISTORY OF CVA (CEREBROVASCULAR ACCIDENT): ICD-10-CM

## 2020-02-11 DIAGNOSIS — I71.21 ASCENDING AORTIC ANEURYSM (H): ICD-10-CM

## 2020-02-11 NOTE — TELEPHONE ENCOUNTER
metoprolol tartrate (LOPRESSOR) 50 MG tablet  Last visit date with prescribing provider: 1-6-2020  Last refill date: 1-  Quantity: 180, Refills: 3    Catie Ramirez LPN      Next 5 appointments (look out 90 days)    May 06, 2020  1:30 PM CDT  (Arrive by 1:15 PM)  Return Visit with Leonie Og DPM  [facility] ([facility] ) [address] [address] [phone]

## 2020-02-12 RX ORDER — METOPROLOL TARTRATE 50 MG
TABLET ORAL
Qty: 60 TABLET | Refills: 1 | Status: SHIPPED | OUTPATIENT
Start: 2020-02-12 | End: 2020-07-29

## 2020-03-02 ENCOUNTER — HEALTH MAINTENANCE LETTER (OUTPATIENT)
Age: 70
End: 2020-03-02

## 2020-04-29 DIAGNOSIS — I71.21 ASCENDING AORTIC ANEURYSM (H): ICD-10-CM

## 2020-04-29 DIAGNOSIS — N18.30 CHRONIC KIDNEY DISEASE, STAGE 3 (MODERATE): ICD-10-CM

## 2020-04-29 DIAGNOSIS — I51.89 DIASTOLIC DYSFUNCTION: ICD-10-CM

## 2020-04-29 DIAGNOSIS — E11.8 TYPE 2 DIABETES MELLITUS WITH COMPLICATION, WITHOUT LONG-TERM CURRENT USE OF INSULIN (H): ICD-10-CM

## 2020-04-29 DIAGNOSIS — I10 BENIGN ESSENTIAL HYPERTENSION: ICD-10-CM

## 2020-04-29 RX ORDER — LISINOPRIL 40 MG/1
TABLET ORAL
Qty: 90 TABLET | Refills: 0 | Status: SHIPPED | OUTPATIENT
Start: 2020-04-29 | End: 2020-07-29

## 2020-04-29 NOTE — TELEPHONE ENCOUNTER
lisinopril (ZESTRIL) 40 MG tablet         Last Written Prescription Date:  1/17/20  Last Fill Quantity: 90,   # refills: 3  Last Office Visit: 1/6/20  Future Office visit:    Next 5 appointments (look out 90 days)    May 06, 2020  1:30 PM CDT  (Arrive by 1:15 PM)  Return Visit with Leonie Og DPM  [facility] ([facility] ) [address] [place] [id number] [phone]           Routing refill request to provider for review/approval because:    Ace Inhibitor Protocol Failed due to:     Normal serum creatinine on file in past 12 months    Creatinine   Date Value Ref Range Status   01/15/2020 1.57 (H) 0.66 - 1.25 mg/dL Final

## 2020-07-15 DIAGNOSIS — N40.0 BENIGN PROSTATIC HYPERPLASIA, UNSPECIFIED WHETHER LOWER URINARY TRACT SYMPTOMS PRESENT: ICD-10-CM

## 2020-07-15 DIAGNOSIS — I10 BENIGN ESSENTIAL HYPERTENSION: ICD-10-CM

## 2020-07-15 RX ORDER — AMLODIPINE BESYLATE 5 MG/1
5 TABLET ORAL DAILY
Qty: 90 TABLET | Refills: 3 | Status: SHIPPED | OUTPATIENT
Start: 2020-07-15 | End: 2021-05-11

## 2020-07-15 RX ORDER — TAMSULOSIN HYDROCHLORIDE 0.4 MG/1
0.4 CAPSULE ORAL DAILY
Qty: 90 CAPSULE | Refills: 3 | Status: SHIPPED | OUTPATIENT
Start: 2020-07-15 | End: 2020-08-03

## 2020-07-15 NOTE — TELEPHONE ENCOUNTER
Patient called to request a refill on the following medications:    Amlodopine    Flomax. He finds if he takes 0.8mg it helps more.  Does the 0.8mg about 3 times per week.

## 2020-07-29 ENCOUNTER — MYC MEDICAL ADVICE (OUTPATIENT)
Dept: INTERNAL MEDICINE | Facility: OTHER | Age: 70
End: 2020-07-29

## 2020-07-29 ENCOUNTER — OFFICE VISIT (OUTPATIENT)
Dept: INTERNAL MEDICINE | Facility: OTHER | Age: 70
End: 2020-07-29
Attending: INTERNAL MEDICINE
Payer: COMMERCIAL

## 2020-07-29 VITALS
HEART RATE: 92 BPM | OXYGEN SATURATION: 98 % | BODY MASS INDEX: 41.68 KG/M2 | WEIGHT: 315 LBS | SYSTOLIC BLOOD PRESSURE: 136 MMHG | DIASTOLIC BLOOD PRESSURE: 88 MMHG | TEMPERATURE: 97 F

## 2020-07-29 DIAGNOSIS — Z12.5 SCREENING FOR PROSTATE CANCER: ICD-10-CM

## 2020-07-29 DIAGNOSIS — N40.0 BENIGN PROSTATIC HYPERPLASIA, UNSPECIFIED WHETHER LOWER URINARY TRACT SYMPTOMS PRESENT: ICD-10-CM

## 2020-07-29 DIAGNOSIS — E78.5 HYPERLIPIDEMIA LDL GOAL <100: ICD-10-CM

## 2020-07-29 DIAGNOSIS — I10 BENIGN ESSENTIAL HYPERTENSION: Primary | ICD-10-CM

## 2020-07-29 DIAGNOSIS — N18.30 CHRONIC KIDNEY DISEASE, STAGE 3 (MODERATE): ICD-10-CM

## 2020-07-29 DIAGNOSIS — I51.89 DIASTOLIC DYSFUNCTION: ICD-10-CM

## 2020-07-29 DIAGNOSIS — Z86.73 HISTORY OF CVA (CEREBROVASCULAR ACCIDENT): ICD-10-CM

## 2020-07-29 DIAGNOSIS — I71.21 ASCENDING AORTIC ANEURYSM (H): ICD-10-CM

## 2020-07-29 DIAGNOSIS — E11.8 TYPE 2 DIABETES MELLITUS WITH COMPLICATION, WITHOUT LONG-TERM CURRENT USE OF INSULIN (H): ICD-10-CM

## 2020-07-29 PROCEDURE — G0463 HOSPITAL OUTPT CLINIC VISIT: HCPCS

## 2020-07-29 PROCEDURE — 99214 OFFICE O/P EST MOD 30 MIN: CPT | Performed by: INTERNAL MEDICINE

## 2020-07-29 RX ORDER — LISINOPRIL 40 MG/1
40 TABLET ORAL DAILY
Qty: 90 TABLET | Refills: 3 | Status: SHIPPED | OUTPATIENT
Start: 2020-07-29 | End: 2021-10-11

## 2020-07-29 RX ORDER — METOPROLOL TARTRATE 50 MG
50 TABLET ORAL 2 TIMES DAILY
Qty: 180 TABLET | Refills: 3 | Status: SHIPPED | OUTPATIENT
Start: 2020-07-29 | End: 2021-10-11

## 2020-07-29 ASSESSMENT — PATIENT HEALTH QUESTIONNAIRE - PHQ9: SUM OF ALL RESPONSES TO PHQ QUESTIONS 1-9: 0

## 2020-07-29 ASSESSMENT — ANXIETY QUESTIONNAIRES
3. WORRYING TOO MUCH ABOUT DIFFERENT THINGS: NOT AT ALL
1. FEELING NERVOUS, ANXIOUS, OR ON EDGE: NOT AT ALL
7. FEELING AFRAID AS IF SOMETHING AWFUL MIGHT HAPPEN: NOT AT ALL
2. NOT BEING ABLE TO STOP OR CONTROL WORRYING: NOT AT ALL
4. TROUBLE RELAXING: NOT AT ALL
5. BEING SO RESTLESS THAT IT IS HARD TO SIT STILL: NOT AT ALL
GAD7 TOTAL SCORE: 0
6. BECOMING EASILY ANNOYED OR IRRITABLE: NOT AT ALL

## 2020-07-29 ASSESSMENT — PAIN SCALES - GENERAL: PAINLEVEL: NO PAIN (0)

## 2020-07-29 NOTE — PROGRESS NOTES
Subjective     Deon Culver is a 70 year old male who presents to clinic today for the following health issues:    HPI       Diabetes Follow-up      How often are you checking your blood sugar? Not at all    What concerns do you have today about your diabetes? None     Do you have any of these symptoms? (Select all that apply)  Numbness in feet              Hyperlipidemia Follow-Up      Are you regularly taking any medication or supplement to lower your cholesterol?   Yes- lipitor    Are you having muscle aches or other side effects that you think could be caused by your cholesterol lowering medication?  No    Hypertension Follow-up      Do you check your blood pressure regularly outside of the clinic? Yes     Are you following a low salt diet? No    Are your blood pressures ever more than 140 on the top number (systolic) OR more   than 90 on the bottom number (diastolic), for example 140/90? No    BP Readings from Last 2 Encounters:   07/29/20 136/88   01/17/20 133/83     Hemoglobin A1C (%)   Date Value   01/15/2020 6.4 (H)   08/28/2019 6.0 (H)     LDL Cholesterol Calculated (mg/dL)   Date Value   01/15/2020 33   11/12/2018 38         Patient Active Problem List   Diagnosis     Benign essential hypertension     Obesity     Type 2 diabetes mellitus      Chronic fatigue     Slurred speech x 3 months, CT neg ED [facility] 4/25/18     Vision changes x 3 months as of 4/18     Chronic bilateral low back pain with sciatica     Altered gait     Altered mental status - since approx 1/18 - delayed response, change in mentation     Hypertrophic cardiomyopathy at 2.2 cm     Ascending aortic aneurysm at 4.45 cm on echo from 1/7/19     SOB (shortness of breath)     Right ventricular enlargement     Chronic kidney disease, stage 3 (moderate) (H)     History of tobacco abuse     H/O ETOH abuse     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 No pertinent surgical history.    Social History     Tobacco Use     Smoking status: Current Some Day Smoker     Packs/day: 0.50     Types: Cigars     Start date: 2003     Last attempt to quit: 2018     Years since quittin.1     Smokeless tobacco: Never Used   Substance Use Topics     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      Family History   Problem Relation Age of Onset     Colon Cancer Mother      Kidney Cancer Father      Kidney Disease Father      Coronary Artery Disease Brother      Pulmonary Embolism Brother      Coronary Artery Disease Brother      Myocardial Infarction Brother          Current Outpatient Medications   Medication Sig Dispense Refill     amLODIPine (NORVASC) 5 MG tablet Take 1 tablet (5 mg) by mouth daily 90 tablet 3     ASPIRIN PO Take 81 mg by mouth daily       atorvastatin (LIPITOR) 40 MG tablet Take 1 tablet (40 mg) by mouth daily 90 tablet 3     Cholecalciferol (VITAMIN D-3) 1000 units CAPS Take 1,000 Units by mouth       CINNAMON PO Take 1,000 mg by mouth       Garlic 1000 MG CAPS        lisinopril (ZESTRIL) 40 MG tablet Take 1 tablet (40 mg) by mouth daily 90 tablet 3     metFORMIN (GLUCOPHAGE-XR) 500 MG 24 hr tablet TAKE 2 TABLETS(1000 MG) BY MOUTH TWICE DAILY WITH MEALS 360 tablet 3     metoprolol tartrate (LOPRESSOR) 50 MG tablet Take 1 tablet (50 mg) by mouth 2 times daily 180 tablet 3     order for DME Equipment being ordered: Stationary Bike 1 Device 0     tamsulosin (FLOMAX) 0.4 MG capsule Take 1 capsule (0.4 mg) by mouth daily 90 capsule 3     TURMERIC CURCUMIN PO Take 1,500 mg by mouth       No Known Allergies  BP Readings from Last 3 Encounters:   20 136/88   20 133/83   01/15/20 122/82    Wt Readings from Last 3 Encounters:   20 149.2 kg (329 lb)   20 137.4 kg (303 lb)   01/15/20 132.5 kg  (292 lb)                    Reviewed and updated as needed this visit by Provider         Review of Systems   Constitutional, HEENT, cardiovascular, pulmonary, gi and gu systems are negative, except as otherwise noted.      Objective    /88 (BP Location: Left arm, Patient Position: Chair, Cuff Size: Adult Large)   Pulse 92   Temp 97  F (36.1  C)   Wt 149.2 kg (329 lb)   SpO2 98%   BMI 41.68 kg/m    Body mass index is 41.68 kg/m .  Physical Exam   GENERAL: Alert and no distress  RESP: lungs clear to auscultation - no rales, rhonchi or wheezes  CV: regular rate and rhythm, normal S1 S2, no S3 or S4, no murmurs  MS: +1 LE edema  SKIN: no suspicious lesions or rashes  NEURO: Normal strength and tone, mentation intact and speech normal  PSYCH: mentation appears normal, affect normal/bright    Diagnostic Test Results:  Labs reviewed in Epic        Assessment & Plan   Problem List Items Addressed This Visit        Endocrine    Type 2 diabetes mellitus     Relevant Medications    lisinopril (ZESTRIL) 40 MG tablet    Other Relevant Orders    Hemoglobin A1c    TSH with free T4 reflex       Circulatory    Benign essential hypertension - Primary    Relevant Medications    lisinopril (ZESTRIL) 40 MG tablet    metoprolol tartrate (LOPRESSOR) 50 MG tablet    Other Relevant Orders    Comprehensive metabolic panel    Ascending aortic aneurysm at 4.45 cm on echo from 1/7/19    Relevant Medications    lisinopril (ZESTRIL) 40 MG tablet    metoprolol tartrate (LOPRESSOR) 50 MG tablet    Diastolic dysfunction grade 1 on 1/7/19    Relevant Medications    lisinopril (ZESTRIL) 40 MG tablet    Other Relevant Orders    CBC with platelets differential       Urinary    Chronic kidney disease, stage 3 (moderate) (H)    Relevant Medications    lisinopril (ZESTRIL) 40 MG tablet    Benign prostatic hyperplasia, unspecified whether lower urinary tract symptoms present       Other    History of CVA 3/18    Relevant Medications     metoprolol tartrate (LOPRESSOR) 50 MG tablet      Other Visit Diagnoses     Hyperlipidemia LDL goal <100        Relevant Orders    Lipid Profile    Screening for prostate cancer        Relevant Orders    PSA, screen           Fasting lab appointment made today.    Sreedhar Mendieta DO  [facility]

## 2020-07-29 NOTE — NURSING NOTE
"Chief Complaint   Patient presents with     Hypertension     Diabetes     Lipids       Initial /88 (BP Location: Left arm, Patient Position: Chair, Cuff Size: Adult Large)   Pulse 92   Temp 97  F (36.1  C)   Wt 149.2 kg (329 lb)   SpO2 98%   BMI 41.68 kg/m   Estimated body mass index is 41.68 kg/m  as calculated from the following:    Height as of 1/17/20: 1.892 m (6' 2.5\").    Weight as of this encounter: 149.2 kg (329 lb).  Medication Reconciliation: complete  EMELYN SEALS LPN  "

## 2020-07-30 ASSESSMENT — ANXIETY QUESTIONNAIRES: GAD7 TOTAL SCORE: 0

## 2020-08-05 DIAGNOSIS — E11.8 TYPE 2 DIABETES MELLITUS WITH COMPLICATION, WITHOUT LONG-TERM CURRENT USE OF INSULIN (H): ICD-10-CM

## 2020-08-05 DIAGNOSIS — I51.89 DIASTOLIC DYSFUNCTION: ICD-10-CM

## 2020-08-05 DIAGNOSIS — E78.5 HYPERLIPIDEMIA LDL GOAL <100: ICD-10-CM

## 2020-08-05 DIAGNOSIS — I10 BENIGN ESSENTIAL HYPERTENSION: ICD-10-CM

## 2020-08-05 DIAGNOSIS — Z12.5 SCREENING FOR PROSTATE CANCER: ICD-10-CM

## 2020-08-05 LAB
ALBUMIN SERPL-MCNC: 4.1 G/DL (ref 3.4–5)
ALP SERPL-CCNC: 87 U/L (ref 40–150)
ALT SERPL W P-5'-P-CCNC: 52 U/L (ref 0–70)
ANION GAP SERPL CALCULATED.3IONS-SCNC: 10 MMOL/L (ref 3–14)
AST SERPL W P-5'-P-CCNC: 15 U/L (ref 0–45)
BASOPHILS # BLD AUTO: 0 10E9/L (ref 0–0.2)
BASOPHILS NFR BLD AUTO: 0.5 %
BILIRUB SERPL-MCNC: 0.6 MG/DL (ref 0.2–1.3)
BUN SERPL-MCNC: 42 MG/DL (ref 7–30)
CALCIUM SERPL-MCNC: 10.2 MG/DL (ref 8.5–10.1)
CHLORIDE SERPL-SCNC: 108 MMOL/L (ref 94–109)
CHOLEST SERPL-MCNC: 101 MG/DL
CO2 SERPL-SCNC: 19 MMOL/L (ref 20–32)
CREAT SERPL-MCNC: 1.65 MG/DL (ref 0.66–1.25)
DIFFERENTIAL METHOD BLD: NORMAL
EOSINOPHIL # BLD AUTO: 0.2 10E9/L (ref 0–0.7)
EOSINOPHIL NFR BLD AUTO: 2.8 %
ERYTHROCYTE [DISTWIDTH] IN BLOOD BY AUTOMATED COUNT: 12.6 % (ref 10–15)
EST. AVERAGE GLUCOSE BLD GHB EST-MCNC: 151 MG/DL
GFR SERPL CREATININE-BSD FRML MDRD: 42 ML/MIN/{1.73_M2}
GLUCOSE SERPL-MCNC: 170 MG/DL (ref 70–99)
HBA1C MFR BLD: 6.9 % (ref 0–5.6)
HCT VFR BLD AUTO: 45.3 % (ref 40–53)
HDLC SERPL-MCNC: 51 MG/DL
HGB BLD-MCNC: 16 G/DL (ref 13.3–17.7)
LDLC SERPL CALC-MCNC: 29 MG/DL
LYMPHOCYTES # BLD AUTO: 2.3 10E9/L (ref 0.8–5.3)
LYMPHOCYTES NFR BLD AUTO: 28.5 %
MCH RBC QN AUTO: 33 PG (ref 26.5–33)
MCHC RBC AUTO-ENTMCNC: 35.3 G/DL (ref 31.5–36.5)
MCV RBC AUTO: 93 FL (ref 78–100)
MONOCYTES # BLD AUTO: 0.5 10E9/L (ref 0–1.3)
MONOCYTES NFR BLD AUTO: 6.7 %
NEUTROPHILS # BLD AUTO: 4.9 10E9/L (ref 1.6–8.3)
NEUTROPHILS NFR BLD AUTO: 61.5 %
NONHDLC SERPL-MCNC: 50 MG/DL
PLATELET # BLD AUTO: 181 10E9/L (ref 150–450)
POTASSIUM SERPL-SCNC: 4.8 MMOL/L (ref 3.4–5.3)
PROT SERPL-MCNC: 7.8 G/DL (ref 6.8–8.8)
PSA SERPL-ACNC: 0.9 UG/L (ref 0–4)
RBC # BLD AUTO: 4.85 10E12/L (ref 4.4–5.9)
SODIUM SERPL-SCNC: 137 MMOL/L (ref 133–144)
TRIGL SERPL-MCNC: 107 MG/DL
TSH SERPL DL<=0.005 MIU/L-ACNC: 1.92 MU/L (ref 0.4–4)
WBC # BLD AUTO: 7.9 10E9/L (ref 4–11)

## 2020-08-05 PROCEDURE — 36415 COLL VENOUS BLD VENIPUNCTURE: CPT | Mod: ZL | Performed by: INTERNAL MEDICINE

## 2020-08-05 PROCEDURE — 80061 LIPID PANEL: CPT | Mod: ZL | Performed by: INTERNAL MEDICINE

## 2020-08-05 PROCEDURE — 83036 HEMOGLOBIN GLYCOSYLATED A1C: CPT | Mod: ZL | Performed by: INTERNAL MEDICINE

## 2020-08-05 PROCEDURE — 85025 COMPLETE CBC W/AUTO DIFF WBC: CPT | Mod: ZL | Performed by: INTERNAL MEDICINE

## 2020-08-05 PROCEDURE — 80053 COMPREHEN METABOLIC PANEL: CPT | Mod: ZL | Performed by: INTERNAL MEDICINE

## 2020-08-05 PROCEDURE — 84443 ASSAY THYROID STIM HORMONE: CPT | Mod: ZL | Performed by: INTERNAL MEDICINE

## 2020-08-05 PROCEDURE — G0103 PSA SCREENING: HCPCS | Mod: ZL | Performed by: INTERNAL MEDICINE

## 2020-08-05 PROCEDURE — 40000788 ZZHCL STATISTIC ESTIMATED AVERAGE GLUCOSE: Mod: ZL | Performed by: INTERNAL MEDICINE

## 2020-09-16 ENCOUNTER — PRE VISIT (OUTPATIENT)
Dept: NEUROLOGY | Facility: CLINIC | Age: 70
End: 2020-09-16

## 2020-09-16 NOTE — TELEPHONE ENCOUNTER
FUTURE VISIT INFORMATION      FUTURE VISIT INFORMATION:    Date: 10/2/2020    Time: 215pm    [place] [facility] [place]  REFERRAL INFORMATION:    Referring provider:  Self     Referring providers clinic:      Reason for visit/diagnosis  Aneurysm     RECORDS REQUESTED FROM:       Clinic name Comments Records Status Imaging Status   [facility] CT Head 4/25/2018 Care Everywhere PACS          Internal MR Brain 6/12/2018, 10/4/2018, 10/28/2019    Dr. Hopkins-10/16/2019 Epic PACS

## 2020-11-13 DIAGNOSIS — E11.8 TYPE 2 DIABETES MELLITUS WITH COMPLICATION, WITHOUT LONG-TERM CURRENT USE OF INSULIN (H): ICD-10-CM

## 2020-11-13 DIAGNOSIS — E78.2 MIXED HYPERLIPIDEMIA: ICD-10-CM

## 2020-11-13 DIAGNOSIS — R47.81 SLURRED SPEECH: ICD-10-CM

## 2020-11-13 DIAGNOSIS — Z86.73 HISTORY OF CVA (CEREBROVASCULAR ACCIDENT): ICD-10-CM

## 2020-11-13 NOTE — TELEPHONE ENCOUNTER
Lipitor      Last Written Prescription Date:  01/17/20  Last Fill Quantity: 90,   # refills: 3  Last Office Visit: 07/29/20  Future Office visit:    Next 5 appointments (look out 90 days)    Jan 20, 2021  1:00 PM  (Arrive by 12:45 PM)  Return Visit with Chas Armendariz DO  [facility] [place] ([facility] [place] ) [address]  [place] [address]  [phone]           Merformin ER      Last Written Prescription Date:  01/17/20  Last Fill Quantity: 360,   # refills: 3  Last Office Visit: 07/29/20  Future Office visit:    Next 5 appointments (look out 90 days)    Jan 20, 2021  1:00 PM  (Arrive by 12:45 PM)  Return Visit with Chas Armendariz DO  [facility] [place] ([facility] [place] ) [address]  [place] [address]  [phone]

## 2020-11-16 RX ORDER — ATORVASTATIN CALCIUM 40 MG/1
40 TABLET, FILM COATED ORAL DAILY
Qty: 90 TABLET | Refills: 3 | Status: SHIPPED | OUTPATIENT
Start: 2020-11-16 | End: 2021-09-10

## 2020-11-16 RX ORDER — METFORMIN HCL 500 MG
TABLET, EXTENDED RELEASE 24 HR ORAL
Qty: 360 TABLET | Refills: 3 | Status: SHIPPED | OUTPATIENT
Start: 2020-11-16 | End: 2021-09-10

## 2020-11-23 ENCOUNTER — VIRTUAL VISIT (OUTPATIENT)
Dept: NEUROLOGY | Facility: CLINIC | Age: 70
End: 2020-11-23
Payer: COMMERCIAL

## 2020-11-23 DIAGNOSIS — I63.111 CEREBROVASCULAR ACCIDENT (CVA) DUE TO EMBOLISM OF RIGHT VERTEBRAL ARTERY (H): Primary | ICD-10-CM

## 2020-11-23 PROCEDURE — 99213 OFFICE O/P EST LOW 20 MIN: CPT | Mod: 95 | Performed by: PSYCHIATRY & NEUROLOGY

## 2020-11-23 NOTE — PROGRESS NOTES
[facility] Neurology Follow Up Visit    eDon Culver MRN# [id number]   Age: 70 year old YOB: 1950     Brief history of symptoms: The patient was initially seen in neurologic consultation on 6/20/2018 and most recently for follow up with Dr. Hopkins on 10/16/2019  for evaluation of prior stroke with deficits of imbalance, dysarthria, low back-leg discomfort. Please see the comprehensive neurologic consultation note from that date in the Epic records for details.     His onset of symptoms occurred between 02-04/2018, and were reported as imbalance, dysarthria, and slurred speech.  He was seen at [facility] ED 4/25/2018 where his BP was noted to be 179/119, but Head CT was reported as normal. A1c at the ED showed level of 10.5.  He followed with a PCP on 4/30/2018 who ordered MRI/MRA head and neck, and on 6/12/2018 the imaging showed abnormal bright signal in ventral medulla on the right at the level of the olivary nuclei but no other major stenosis or vessel abnormalities.  After being seen by neurology in 2018, another image of his brain was ordered. His brain MRI 10/4/2018 was compared to 6/12/2018 imaging, and thought to be stable.  Repeat MRI of the brain was done 10/28/2019, showing bright abnormal signal in the brain stem (R-parasaggital aspect of the medulla, suggestive for focal encephalomalacia from prior vascular insult).  It was presumed he had a vascular insult to the medulla leading to these image findings. His speech improved over the following 3 months, and had difficulties with tandem walking with absent reflexes except for patellar. After his last visit, 10/16/2019, it was thought that he would benefit from repeat MRI brain in one year.    MRI of lumbar spine 8/28/2019 did show L4-5 spondylolisthesis and ligamentum hypertrophy resulting in compression of b/l L5 nerve roots, and L3-4 ligamentum flavum hypertrophy with small synovial cyst compressing R-L4 nerve root. ESR, CRP, CPK, Aldolase,  and acyetylcholine receptor studies were done and normal. EMG on 7/9/2018 was suggestive for chronic b/l lumbosacral radiculopathy affect L4-S1 nerve roots in the context of spinal stenosis as well as mild right sided ulnar neuroapthy. Overall, the patient was not interested in neurosurgical or orthopedic consult for his lumbar symptoms, and wanted instead to continue with PT.     During cardiac w/up, the patient was found to have hypertrophic cardiomyoapthy, as well as a 4.4 cm aortic aneurysm.    Interval history: the patient still feels that he has imbalance and weakened legs.  He attributes some of this weakness to not being active. Going up and down stairs is difficult.  He also has put on some weight (he is now 322 lbs, up from 270 lbs earlier this year).  The patient has not been able to pursue PT to the greatest extent in history.      The patient doesn't indicate any new neurological deficits. He has no back pain at this moment.  He is still not necessarily interested in neurosurgery or orthopedic surgery consultations.      Past Medical History:     Patient Active Problem List   Diagnosis     Benign essential hypertension     Obesity     Type 2 diabetes mellitus      Chronic fatigue     [facility]     Vision changes x 3 months as of 4/18     Chronic bilateral low back pain with sciatica     Altered gait     Altered mental status - since approx 1/18 - delayed response, change in mentation     Hypertrophic cardiomyopathy at 2.2 cm     Ascending aortic aneurysm at 4.45 cm on echo from 1/7/19     SOB (shortness of breath)     Right ventricular enlargement     Chronic kidney disease, stage 3 (moderate)     History of tobacco abuse     H/O ETOH abuse     History of CVA 3/18     Special screening for malignant neoplasms, colon     Diastolic dysfunction grade 1 on 1/7/19     Obesity (BMI 35.0-39.9) with comorbidity (H)     Benign prostatic hyperplasia, unspecified whether  lower urinary tract symptoms present     Past Medical History:   Diagnosis Date     Altered gait 2018     Altered mental status - since approx  - delayed response, change in mentation 2018     Benign essential hypertension 2018     Chronic bilateral low back pain with sciatica 2018     Chronic fatigue 2018     Obesity 2018     Slurred speech x 3 months, CT neg ED [facility] 18     Stroke (H)      Type 2 diabetes mellitus with complication, without long-term current use of insulin (H) 2018     Vision changes x 3 months as of 2018      Past Surgical History:   No major surgeries     Social History:     Tobacco Use     Smoking status: Current Some Day Smoker     Packs/day: 0.50     Types: Cigars     Start date: 2003     Last attempt to quit: 2018     Years since quittin.4     Smokeless tobacco: Never Used   Substance Use Topics     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      Drug use: No      Family History:     Family History   Problem Relation Age of Onset     Colon Cancer Mother      Kidney Cancer Father      Kidney Disease Father      Coronary Artery Disease Brother      Pulmonary Embolism Brother      Coronary Artery Disease Brother      Myocardial Infarction Brother       Medications:     Current Outpatient Medications   Medication Sig     amLODIPine (NORVASC) 5 MG tablet Take 1 tablet (5 mg) by mouth daily     ASPIRIN PO Take 81 mg by mouth daily     atorvastatin (LIPITOR) 40 MG tablet Take 1 tablet (40 mg) by mouth daily     Cholecalciferol (VITAMIN D-3) 1000 units CAPS Take 1,000 Units by mouth     CINNAMON PO Take 1,000 mg by mouth     Garlic 1000 MG CAPS      lisinopril (ZESTRIL) 40 MG tablet Take 1 tablet (40 mg) by mouth daily     metFORMIN (GLUCOPHAGE-XR) 500 MG 24 hr tablet TAKE 2 TABLETS(1000 MG) BY MOUTH TWICE DAILY WITH MEALS     metoprolol tartrate (LOPRESSOR) 50 MG tablet Take 1 tablet (50 mg) by mouth 2 times daily      order for DME Equipment being ordered: Stationary Bike     tamsulosin (FLOMAX) 0.4 MG capsule Take 2 capsules (0.8 mg) by mouth daily     TURMERIC CURCUMIN PO Take 1,500 mg by mouth      Allergies:   No Known Allergies     Review of Systems:   A comprehensive 10 point review of systems (constitutional, ENT, cardiac, peripheral vascular, lymphatic, respiratory, GI, , Musculoskeletal, skin, Neurological) was performed and found to be negative except as described in this note.      Physical Exam:   Telephone visit.    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  His weakness is still present, but I would agree that this is likely 2/2 sedentary life-style with diet and nutrition and decreased activity.  He may benefit from PT guidance on an exercise regiment for strength and conditioning that doesn't put him at risk for fall given his imbalance.  The patient can have a repeat MRI brain to make sure no other strokes have occurred or any changes to prior stroke are present.     Plan:  PT for strength and conditioning  MRI brain w/wout contrast      Follow up in Neurology clinic in 9 months (in-person, clinic) or earlier as needed should new concerns arise.    JIA Fernandez D.O.   of Neurology      Greater than 50% of the total time (15 min) in this patient encounter was spent on counseling and/or coordination of care. We reviewed diagnostic results, impressions, and discussed other possible tests if symptoms do not improve. We discussed the implications of the diagnosis, as well as risks and benefits of management options. We reviewed treatment instructions  and our scheduled follow-up as specified in the discharge plan. We also discussed the importance of compliance with the chosen course of treatment. The patient is in agreement with this plan and has no further questions.

## 2020-11-23 NOTE — PROGRESS NOTES
"Deon Culver is a 70 year old male who is being evaluated via a billable telephone visit.      The patient has been notified of following:     \"This telephone visit will be conducted via a call between you and your physician/provider. We have found that certain health care needs can be provided without the need for a physical exam.  This service lets us provide the care you need with a short phone conversation.  If a prescription is necessary we can send it directly to your pharmacy.  If lab work is needed we can place an order for that and you can then stop by our lab to have the test done at a later time.    Telephone visits are billed at different rates depending on your insurance coverage. During this emergency period, for some insurers they may be billed the same as an in-person visit.  Please reach out to your insurance provider with any questions.    If during the course of the call the physician/provider feels a telephone visit is not appropriate, you will not be charged for this service.\"    Patient has given verbal consent for Telephone visit?  Yes    What phone number would you like to be contacted at? [phone]    How would you like to obtain your [facility]? [facility]    Phone call duration: 15 minutes    Sreedhar Fernandez, DO            "

## 2020-11-23 NOTE — LETTER
11/23/2020       RE: Deon Culver  [address]  [facility] [address]     Dear Colleague,    Thank you for referring your patient, Deon Culver, to the [facility] [place] at [facility]. Please see a copy of my visit note below.    [facility] Neurology Follow Up Visit    Deon Culver MRN# [id number]   Age: 70 year old YOB: 1950     Brief history of symptoms: The patient was initially seen in neurologic consultation on 6/20/2018 and most recently for follow up with Dr. Hopkins on 10/16/2019  for evaluation of prior stroke with deficits of imbalance, dysarthria, low back-leg discomfort. Please see the comprehensive neurologic consultation note from that date in the Epic records for details.     His onset of symptoms occurred between 02-04/2018, and were reported as imbalance, dysarthria, and slurred speech.  He was seen at [facility] ED 4/25/2018 where his BP was noted to be 179/119, but Head CT was reported as normal. A1c at the ED showed level of 10.5.  He followed with a PCP on 4/30/2018 who ordered MRI/MRA head and neck, and on 6/12/2018 the imaging showed abnormal bright signal in ventral medulla on the right at the level of the olivary nuclei but no other major stenosis or vessel abnormalities.  After being seen by neurology in 2018, another image of his brain was ordered. His brain MRI 10/4/2018 was compared to 6/12/2018 imaging, and thought to be stable.  Repeat MRI of the brain was done 10/28/2019, showing bright abnormal signal in the brain stem (R-parasaggital aspect of the medulla, suggestive for focal encephalomalacia from prior vascular insult).  It was presumed he had a vascular insult to the medulla leading to these image findings. His speech improved over the following 3 months, and had difficulties with tandem walking with absent reflexes except for patellar. After his last visit, 10/16/2019, it was thought that he  would benefit from repeat MRI brain in one year.    MRI of lumbar spine 8/28/2019 did show L4-5 spondylolisthesis and ligamentum hypertrophy resulting in compression of b/l L5 nerve roots, and L3-4 ligamentum flavum hypertrophy with small synovial cyst compressing R-L4 nerve root. ESR, CRP, CPK, Aldolase, and acyetylcholine receptor studies were done and normal. EMG on 7/9/2018 was suggestive for chronic b/l lumbosacral radiculopathy affect L4-S1 nerve roots in the context of spinal stenosis as well as mild right sided ulnar neuroapthy. Overall, the patient was not interested in neurosurgical or orthopedic consult for his lumbar symptoms, and wanted instead to continue with PT.     During cardiac w/up, the patient was found to have hypertrophic cardiomyoapthy, as well as a 4.4 cm aortic aneurysm.    Interval history: the patient still feels that he has imbalance and weakened legs.  He attributes some of this weakness to not being active. Going up and down stairs is difficult.  He also has put on some weight (he is now 322 lbs, up from 270 lbs earlier this year).  The patient has not been able to pursue PT to the greatest extent in history.      The patient doesn't indicate any new neurological deficits. He has no back pain at this moment.  He is still not necessarily interested in neurosurgery or orthopedic surgery consultations.      Past Medical History:     Patient Active Problem List   Diagnosis     Benign essential hypertension     Obesity     Type 2 diabetes mellitus      Chronic fatigue     Slurred speech x 3 months, CT neg ED [facility] 4/25/18     Vision changes x 3 months as of 4/18     Chronic bilateral low back pain with sciatica     Altered gait     Altered mental status - since approx 1/18 - delayed response, change in mentation     Hypertrophic cardiomyopathy at 2.2 cm     Ascending aortic aneurysm at 4.45 cm on echo from 1/7/19     SOB (shortness of breath)     Right ventricular enlargement     Chronic  kidney disease, stage 3 (moderate)     History of tobacco abuse     H/O ETOH abuse     History of CVA 3/18     Special screening for malignant neoplasms, colon     Diastolic dysfunction grade 1 on 19     Obesity (BMI 35.0-39.9) with comorbidity (H)     Benign prostatic hyperplasia, unspecified whether lower urinary tract symptoms present     Past Medical History:   Diagnosis Date     Altered gait 2018     Altered mental status - since approx  - delayed response, change in mentation 2018     Benign essential hypertension 2018     Chronic bilateral low back pain with sciatica 2018     Chronic fatigue 2018     Obesity 2018     Slurred speech x 3 months, CT neg ED [facility] 18     Stroke (H)      Type 2 diabetes mellitus with complication, without long-term current use of insulin (H) 2018     Vision changes x 3 months as of 2018      Past Surgical History:   No major surgeries     Social History:     Tobacco Use     Smoking status: Current Some Day Smoker     Packs/day: 0.50     Types: Cigars     Start date: 2003     Last attempt to quit: 2018     Years since quittin.4     Smokeless tobacco: Never Used   Substance Use Topics     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      Drug use: No      Family History:     Family History   Problem Relation Age of Onset     Colon Cancer Mother      Kidney Cancer Father      Kidney Disease Father      Coronary Artery Disease Brother      Pulmonary Embolism Brother      Coronary Artery Disease Brother      Myocardial Infarction Brother       Medications:     Current Outpatient Medications   Medication Sig     amLODIPine (NORVASC) 5 MG tablet Take 1 tablet (5 mg) by mouth daily     ASPIRIN PO Take 81 mg by mouth daily     atorvastatin (LIPITOR) 40 MG tablet Take 1 tablet (40 mg) by mouth daily     Cholecalciferol (VITAMIN D-3) 1000 units CAPS Take 1,000 Units by mouth     CINNAMON PO Take 1,000 mg by  mouth     Garlic 1000 MG CAPS      lisinopril (ZESTRIL) 40 MG tablet Take 1 tablet (40 mg) by mouth daily     metFORMIN (GLUCOPHAGE-XR) 500 MG 24 hr tablet TAKE 2 TABLETS(1000 MG) BY MOUTH TWICE DAILY WITH MEALS     metoprolol tartrate (LOPRESSOR) 50 MG tablet Take 1 tablet (50 mg) by mouth 2 times daily     order for DME Equipment being ordered: Stationary Bike     tamsulosin (FLOMAX) 0.4 MG capsule Take 2 capsules (0.8 mg) by mouth daily     TURMERIC CURCUMIN PO Take 1,500 mg by mouth      Allergies:   No Known Allergies     Review of Systems:   A comprehensive 10 point review of systems (constitutional, ENT, cardiac, peripheral vascular, lymphatic, respiratory, GI, , Musculoskeletal, skin, Neurological) was performed and found to be negative except as described in this note.      Physical Exam:   Telephone visit.    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  His weakness is still present, but I would agree that this is likely 2/2 sedentary life-style with diet and nutrition and decreased activity.  He may benefit from PT guidance on an exercise regiment for strength and conditioning that doesn't put him at risk for fall given his imbalance.  The patient can have a repeat MRI brain to make sure no other strokes have occurred or any changes to prior stroke are present.     Plan:  PT for strength and conditioning  MRI brain w/wout contrast      Follow up in Neurology clinic in 9 months (in-person, clinic) or earlier as needed should new concerns arise.    JIA Fernandez D.O.   of Neurology    Greater than 50% of the total time (15 min) in this patient  "encounter was spent on counseling and/or coordination of care. We reviewed diagnostic results, impressions, and discussed other possible tests if symptoms do not improve. We discussed the implications of the diagnosis, as well as risks and benefits of management options. We reviewed treatment instructions and our scheduled follow-up as specified in the discharge plan. We also discussed the importance of compliance with the chosen course of treatment. The patient is in agreement with this plan and has no further questions.      Deon Culver is a 70 year old male who is being evaluated via a billable telephone visit.      The patient has been notified of following:     \"This telephone visit will be conducted via a call between you and your physician/provider. We have found that certain health care needs can be provided without the need for a physical exam.  This service lets us provide the care you need with a short phone conversation.  If a prescription is necessary we can send it directly to your pharmacy.  If lab work is needed we can place an order for that and you can then stop by our lab to have the test done at a later time.    Telephone visits are billed at different rates depending on your insurance coverage. During this emergency period, for some insurers they may be billed the same as an in-person visit.  Please reach out to your insurance provider with any questions.    If during the course of the call the physician/provider feels a telephone visit is not appropriate, you will not be charged for this service.\"    Patient has given verbal consent for Telephone visit?  Yes    What phone number would you like to be contacted at? [phone]    How would you like to obtain your AVS? [facility]    Phone call duration: 15 minutes    Sreedhar Fernandez, DO            "

## 2020-12-20 ENCOUNTER — HEALTH MAINTENANCE LETTER (OUTPATIENT)
Age: 70
End: 2020-12-20

## 2021-01-14 ENCOUNTER — TELEPHONE (OUTPATIENT)
Dept: INTERNAL MEDICINE | Facility: OTHER | Age: 71
End: 2021-01-14

## 2021-01-14 NOTE — TELEPHONE ENCOUNTER
Notified pt of note below. Also explained that [facility] pharmacists would also be able to help find diabetic safe OTC cough syrup in the store if pt needed assistance.

## 2021-01-14 NOTE — TELEPHONE ENCOUNTER
Anything without pseudoephedrine or ephedrine.  If he takes a liquid medication make sure it is diabetic formulation

## 2021-01-20 ENCOUNTER — TELEPHONE (OUTPATIENT)
Dept: INTERNAL MEDICINE | Facility: OTHER | Age: 71
End: 2021-01-20

## 2021-01-20 NOTE — TELEPHONE ENCOUNTER
Patient is due for a follow up apt with PCP- HTN/lipids/DM     LVM to call back and schedule first available appt.

## 2021-01-28 NOTE — PROGRESS NOTES
"  Assessment & Plan   Problem List Items Addressed This Visit        Circulatory    Benign essential hypertension    Relevant Medications    tamsulosin (FLOMAX) 0.4 MG capsule    Other Relevant Orders    CBC with platelets differential (Completed)       Urinary    Benign prostatic hyperplasia, unspecified whether lower urinary tract symptoms present    Relevant Medications    tamsulosin (FLOMAX) 0.4 MG capsule      Other Visit Diagnoses     Type 2 diabetes, HbA1C goal < 8% (H)    -  Primary    Relevant Orders    CBC with platelets differential (Completed)    Hemoglobin A1c (Completed)    Albumin Random Urine Quantitative with Creat Ratio    Hyperlipidemia LDL goal <100        Relevant Orders    Lipid Profile (Completed)    Comprehensive metabolic panel (Completed)           20 minutes spent on the date of the encounter doing chart review, review of test results, interpretation of tests, patient visit and documentation          Tobacco Cessation:   reports that he has been smoking cigars. He started smoking about 18 years ago. He has been smoking about 0.50 packs per day. He has never used smokeless tobacco.      BMI:   Estimated body mass index is 43.65 kg/m  as calculated from the following:    Height as of this encounter: 1.88 m (6' 2\").    Weight as of this encounter: 154.2 kg (340 lb).         Sreedhar Mendieta,   [facility]     Mendoza is a 70 year old who presents to clinic today for the following health issues     HPI     Mendoza presents for a routine follow up for HTN, Diabetes, and hyperlipidemia. Pt denies concerns or symptoms related to blood pressure. Mendoza does report a concern for increased weight, he states he does not exercise due to difficulty moving around. He reports not eating sugar, but does eat carbohydrates \"on occasion\". He reports numbness in bilateral feet.     Diabetes Follow-up      How often are you checking your blood sugar? Not at all    What " "concerns do you have today about your diabetes? None     Do you have any of these symptoms? (Select all that apply)  Numbness in feet    Have you had a diabetic eye exam in the last 12 months? YES          Hyperlipidemia Follow-Up      Are you regularly taking any medication or supplement to lower your cholesterol?   Yes- Lipitor     Are you having muscle aches or other side effects that you think could be caused by your cholesterol lowering medication?  No    Hypertension Follow-up      Do you check your blood pressure regularly outside of the clinic? No     Are you following a low salt diet? Yes    Are your blood pressures ever more than 140 on the top number (systolic) OR more   than 90 on the bottom number (diastolic), for example 140/90? No    BP Readings from Last 2 Encounters:   02/02/21 122/72   07/29/20 136/88     Hemoglobin A1C (%)   Date Value   08/05/2020 6.9 (H)   01/15/2020 6.4 (H)     LDL Cholesterol Calculated (mg/dL)   Date Value   08/05/2020 29   01/15/2020 33         Review of Systems   Constitutional: Negative for activity change and appetite change.        Lives a sedentary lifestyle    Cardiovascular: Negative for chest pain.   Neurological: Negative for dizziness.      Constitutional, HEENT, cardiovascular, pulmonary, gi and gu systems are negative, except as otherwise noted.      Objective    /72 (BP Location: Left arm, Patient Position: Chair, Cuff Size: Adult Large)   Pulse 97   Temp 97  F (36.1  C) (Tympanic)   Ht 1.88 m (6' 2\")   Wt (!) 154.2 kg (340 lb)   SpO2 97%   BMI 43.65 kg/m    Body mass index is 43.65 kg/m .  Physical Exam   GENERAL: Alert and no distress  RESP: lungs clear to auscultation - no rales, rhonchi or wheezes  CV: regular rates and rhythm, normal S1 S2, no S3 or S4 and no murmur, click or rub. Bilateral lower extremities slightly dusky in color in dependent sitting position   ABDOMEN: soft, bowel sounds normal  MS: gait steady, uses walker as needed   SKIN:  " bilateral feet dry, heavily calloused. Toenails hypertrophic.   NEURO:  mentation intact and speech normal, monofilament testing revealed sensation intact  PSYCH:  affect normal/bright    Plan:   HTN- controlled at this time, continue medication regimen    Type 2 DM-  A1c  Microalbumin   Lipid profile    CMP  CBC      Results for orders placed or performed in visit on 02/02/21 (from the past 24 hour(s))   CBC with platelets differential   Result Value Ref Range    WBC 8.9 4.0 - 11.0 10e9/L    RBC Count 4.96 4.4 - 5.9 10e12/L    Hemoglobin 15.8 13.3 - 17.7 g/dL    Hematocrit 45.2 40.0 - 53.0 %    MCV 91 78 - 100 fl    MCH 31.9 26.5 - 33.0 pg    MCHC 35.0 31.5 - 36.5 g/dL    RDW 12.5 10.0 - 15.0 %    Platelet Count 173 150 - 450 10e9/L    % Neutrophils 65.2 %    % Lymphocytes 26.0 %    % Monocytes 6.8 %    % Eosinophils 1.5 %    % Basophils 0.5 %    Absolute Neutrophil 5.8 1.6 - 8.3 10e9/L    Absolute Lymphocytes 2.3 0.8 - 5.3 10e9/L    Absolute Monocytes 0.6 0.0 - 1.3 10e9/L    Absolute Eosinophils 0.1 0.0 - 0.7 10e9/L    Absolute Basophils 0.0 0.0 - 0.2 10e9/L    Diff Method Automated Method       Pending         Case and care plan discussed with student.  I attest and agree to the documentation/evaluation as noted above.      Sreedhar Mendieta,

## 2021-02-02 ENCOUNTER — OFFICE VISIT (OUTPATIENT)
Dept: INTERNAL MEDICINE | Facility: OTHER | Age: 71
End: 2021-02-02
Attending: INTERNAL MEDICINE
Payer: COMMERCIAL

## 2021-02-02 VITALS
WEIGHT: 315 LBS | OXYGEN SATURATION: 97 % | TEMPERATURE: 97 F | HEIGHT: 74 IN | BODY MASS INDEX: 40.43 KG/M2 | HEART RATE: 97 BPM | SYSTOLIC BLOOD PRESSURE: 122 MMHG | DIASTOLIC BLOOD PRESSURE: 72 MMHG

## 2021-02-02 DIAGNOSIS — E11.9 TYPE 2 DIABETES, HBA1C GOAL < 8% (H): Primary | ICD-10-CM

## 2021-02-02 DIAGNOSIS — N40.0 BENIGN PROSTATIC HYPERPLASIA, UNSPECIFIED WHETHER LOWER URINARY TRACT SYMPTOMS PRESENT: ICD-10-CM

## 2021-02-02 DIAGNOSIS — E78.5 HYPERLIPIDEMIA LDL GOAL <100: ICD-10-CM

## 2021-02-02 DIAGNOSIS — I10 BENIGN ESSENTIAL HYPERTENSION: ICD-10-CM

## 2021-02-02 LAB
ALBUMIN SERPL-MCNC: 4 G/DL (ref 3.4–5)
ALP SERPL-CCNC: 85 U/L (ref 40–150)
ALT SERPL W P-5'-P-CCNC: 42 U/L (ref 0–70)
ANION GAP SERPL CALCULATED.3IONS-SCNC: 11 MMOL/L (ref 3–14)
AST SERPL W P-5'-P-CCNC: 13 U/L (ref 0–45)
BASOPHILS # BLD AUTO: 0 10E9/L (ref 0–0.2)
BASOPHILS NFR BLD AUTO: 0.5 %
BILIRUB SERPL-MCNC: 0.6 MG/DL (ref 0.2–1.3)
BUN SERPL-MCNC: 35 MG/DL (ref 7–30)
CALCIUM SERPL-MCNC: 10.1 MG/DL (ref 8.5–10.1)
CHLORIDE SERPL-SCNC: 107 MMOL/L (ref 94–109)
CHOLEST SERPL-MCNC: 104 MG/DL
CO2 SERPL-SCNC: 18 MMOL/L (ref 20–32)
CREAT SERPL-MCNC: 1.76 MG/DL (ref 0.66–1.25)
DIFFERENTIAL METHOD BLD: NORMAL
EOSINOPHIL # BLD AUTO: 0.1 10E9/L (ref 0–0.7)
EOSINOPHIL NFR BLD AUTO: 1.5 %
ERYTHROCYTE [DISTWIDTH] IN BLOOD BY AUTOMATED COUNT: 12.5 % (ref 10–15)
EST. AVERAGE GLUCOSE BLD GHB EST-MCNC: 166 MG/DL
GFR SERPL CREATININE-BSD FRML MDRD: 38 ML/MIN/{1.73_M2}
GLUCOSE SERPL-MCNC: 184 MG/DL (ref 70–99)
HBA1C MFR BLD: 7.4 % (ref 0–5.6)
HCT VFR BLD AUTO: 45.2 % (ref 40–53)
HDLC SERPL-MCNC: 43 MG/DL
HGB BLD-MCNC: 15.8 G/DL (ref 13.3–17.7)
LDLC SERPL CALC-MCNC: 31 MG/DL
LYMPHOCYTES # BLD AUTO: 2.3 10E9/L (ref 0.8–5.3)
LYMPHOCYTES NFR BLD AUTO: 26 %
MCH RBC QN AUTO: 31.9 PG (ref 26.5–33)
MCHC RBC AUTO-ENTMCNC: 35 G/DL (ref 31.5–36.5)
MCV RBC AUTO: 91 FL (ref 78–100)
MONOCYTES # BLD AUTO: 0.6 10E9/L (ref 0–1.3)
MONOCYTES NFR BLD AUTO: 6.8 %
NEUTROPHILS # BLD AUTO: 5.8 10E9/L (ref 1.6–8.3)
NEUTROPHILS NFR BLD AUTO: 65.2 %
NONHDLC SERPL-MCNC: 61 MG/DL
PLATELET # BLD AUTO: 173 10E9/L (ref 150–450)
POTASSIUM SERPL-SCNC: 4.9 MMOL/L (ref 3.4–5.3)
PROT SERPL-MCNC: 7.4 G/DL (ref 6.8–8.8)
RBC # BLD AUTO: 4.96 10E12/L (ref 4.4–5.9)
SODIUM SERPL-SCNC: 136 MMOL/L (ref 133–144)
TRIGL SERPL-MCNC: 150 MG/DL
WBC # BLD AUTO: 8.9 10E9/L (ref 4–11)

## 2021-02-02 PROCEDURE — 99214 OFFICE O/P EST MOD 30 MIN: CPT | Performed by: INTERNAL MEDICINE

## 2021-02-02 PROCEDURE — 80053 COMPREHEN METABOLIC PANEL: CPT | Mod: ZL | Performed by: INTERNAL MEDICINE

## 2021-02-02 PROCEDURE — 36415 COLL VENOUS BLD VENIPUNCTURE: CPT | Mod: ZL | Performed by: INTERNAL MEDICINE

## 2021-02-02 PROCEDURE — 83036 HEMOGLOBIN GLYCOSYLATED A1C: CPT | Mod: ZL | Performed by: INTERNAL MEDICINE

## 2021-02-02 PROCEDURE — 999N001182 HC STATISTIC ESTIMATED AVERAGE GLUCOSE: Mod: ZL | Performed by: INTERNAL MEDICINE

## 2021-02-02 PROCEDURE — G0463 HOSPITAL OUTPT CLINIC VISIT: HCPCS

## 2021-02-02 PROCEDURE — 80061 LIPID PANEL: CPT | Mod: ZL | Performed by: INTERNAL MEDICINE

## 2021-02-02 PROCEDURE — 85025 COMPLETE CBC W/AUTO DIFF WBC: CPT | Mod: ZL | Performed by: INTERNAL MEDICINE

## 2021-02-02 RX ORDER — TAMSULOSIN HYDROCHLORIDE 0.4 MG/1
0.8 CAPSULE ORAL DAILY
Qty: 180 CAPSULE | Refills: 3 | Status: SHIPPED | OUTPATIENT
Start: 2021-02-02 | End: 2021-05-18

## 2021-02-02 ASSESSMENT — ANXIETY QUESTIONNAIRES
GAD7 TOTAL SCORE: 0
4. TROUBLE RELAXING: NOT AT ALL
3. WORRYING TOO MUCH ABOUT DIFFERENT THINGS: NOT AT ALL
5. BEING SO RESTLESS THAT IT IS HARD TO SIT STILL: NOT AT ALL
7. FEELING AFRAID AS IF SOMETHING AWFUL MIGHT HAPPEN: NOT AT ALL
6. BECOMING EASILY ANNOYED OR IRRITABLE: NOT AT ALL
2. NOT BEING ABLE TO STOP OR CONTROL WORRYING: NOT AT ALL
1. FEELING NERVOUS, ANXIOUS, OR ON EDGE: NOT AT ALL

## 2021-02-02 ASSESSMENT — MIFFLIN-ST. JEOR: SCORE: 2376.98

## 2021-02-02 ASSESSMENT — PAIN SCALES - GENERAL: PAINLEVEL: NO PAIN (0)

## 2021-02-02 ASSESSMENT — ENCOUNTER SYMPTOMS
ACTIVITY CHANGE: 0
APPETITE CHANGE: 0
DIZZINESS: 0

## 2021-02-02 ASSESSMENT — PATIENT HEALTH QUESTIONNAIRE - PHQ9: SUM OF ALL RESPONSES TO PHQ QUESTIONS 1-9: 0

## 2021-02-02 NOTE — NURSING NOTE
"Chief Complaint   Patient presents with     Hypertension     Diabetes     Lipids       Initial /72 (BP Location: Left arm, Patient Position: Chair, Cuff Size: Adult Large)   Pulse 97   Temp 97  F (36.1  C) (Tympanic)   Ht 1.88 m (6' 2\")   Wt (!) 154.2 kg (340 lb)   SpO2 97%   BMI 43.65 kg/m   Estimated body mass index is 43.65 kg/m  as calculated from the following:    Height as of this encounter: 1.88 m (6' 2\").    Weight as of this encounter: 154.2 kg (340 lb).  Medication Reconciliation: complete  EMELYN SEALS LPN    "

## 2021-02-03 ASSESSMENT — ANXIETY QUESTIONNAIRES: GAD7 TOTAL SCORE: 0

## 2021-03-14 NOTE — PROGRESS NOTES
Physical Therapy Discharge Note      Name: Deon Culver MRN# [id number]   Age: 68 year old YOB: 1950        Requesting provider: Dr. Mendieta  Diagnosis: History of CVA  Prescription: Evaluate and treat  Frequency/duration: Therapists discretion  Services provided: The patient was seen for a total of 5 visits from 10/4/18 to 11/1/18.  See the previous documentation for treatment details.        Plan  Intervention:  The patient has not contacted the department since the previous visit.  For additional information regarding goals and status as of last, please refer to prior documentation.  It is uncertain if the patient has attained any further goals at this time.  The patient will be formally discharged from physical therapy care.  We will resume physical therapy services as per physician referral and discretion.     Follow up:  Recheck with physician as needed  
English

## 2021-04-05 ENCOUNTER — TELEPHONE (OUTPATIENT)
Dept: CARDIOLOGY | Facility: OTHER | Age: 71
End: 2021-04-05

## 2021-04-05 DIAGNOSIS — R06.02 SOB (SHORTNESS OF BREATH): Primary | ICD-10-CM

## 2021-04-05 DIAGNOSIS — I71.21 ASCENDING AORTIC ANEURYSM (H): ICD-10-CM

## 2021-04-05 DIAGNOSIS — I51.7 RIGHT VENTRICULAR ENLARGEMENT: ICD-10-CM

## 2021-04-05 DIAGNOSIS — I42.2 HYPERTROPHIC CARDIOMYOPATHY (H): ICD-10-CM

## 2021-04-05 DIAGNOSIS — I51.89 DIASTOLIC DYSFUNCTION: ICD-10-CM

## 2021-04-05 DIAGNOSIS — I10 BENIGN ESSENTIAL HYPERTENSION: ICD-10-CM

## 2021-04-16 DIAGNOSIS — I51.7 RIGHT VENTRICULAR ENLARGEMENT: ICD-10-CM

## 2021-04-16 DIAGNOSIS — I71.21 ASCENDING AORTIC ANEURYSM (H): ICD-10-CM

## 2021-04-16 DIAGNOSIS — I42.2 HYPERTROPHIC CARDIOMYOPATHY (H): ICD-10-CM

## 2021-04-16 DIAGNOSIS — I10 BENIGN ESSENTIAL HYPERTENSION: Primary | ICD-10-CM

## 2021-04-18 ENCOUNTER — HEALTH MAINTENANCE LETTER (OUTPATIENT)
Age: 71
End: 2021-04-18

## 2021-05-03 ENCOUNTER — HOSPITAL ENCOUNTER (OUTPATIENT)
Dept: CARDIOLOGY | Facility: HOSPITAL | Age: 71
Discharge: HOME | End: 2021-05-03
Attending: INTERNAL MEDICINE | Admitting: INTERNAL MEDICINE
Payer: COMMERCIAL

## 2021-05-03 DIAGNOSIS — I71.21 ASCENDING AORTIC ANEURYSM (H): Primary | ICD-10-CM

## 2021-05-03 DIAGNOSIS — I51.7 RIGHT VENTRICULAR ENLARGEMENT: ICD-10-CM

## 2021-05-03 DIAGNOSIS — I51.89 DIASTOLIC DYSFUNCTION: ICD-10-CM

## 2021-05-03 DIAGNOSIS — I42.2 HYPERTROPHIC CARDIOMYOPATHY (H): ICD-10-CM

## 2021-05-03 DIAGNOSIS — I10 BENIGN ESSENTIAL HYPERTENSION: ICD-10-CM

## 2021-05-03 DIAGNOSIS — I71.21 ASCENDING AORTIC ANEURYSM (H): ICD-10-CM

## 2021-05-03 DIAGNOSIS — R06.02 SOB (SHORTNESS OF BREATH): ICD-10-CM

## 2021-05-03 PROCEDURE — 93306 TTE W/DOPPLER COMPLETE: CPT

## 2021-05-03 PROCEDURE — 93306 TTE W/DOPPLER COMPLETE: CPT | Mod: 26 | Performed by: INTERNAL MEDICINE

## 2021-05-11 DIAGNOSIS — I10 BENIGN ESSENTIAL HYPERTENSION: ICD-10-CM

## 2021-05-11 RX ORDER — AMLODIPINE BESYLATE 5 MG/1
TABLET ORAL
Qty: 90 TABLET | Refills: 3 | Status: SHIPPED | OUTPATIENT
Start: 2021-05-11 | End: 2021-07-07

## 2021-05-11 NOTE — TELEPHONE ENCOUNTER
Norvasc      Last Written Prescription Date:  7/15/20  Last Fill Quantity: 90,   # refills: 3  Last Office Visit: 2/2/21  Future Office visit:    Next 5 appointments (look out 90 days)    May 19, 2021 11:30 AM  (Arrive by 11:15 AM)  Return Visit with Chas Armendariz DO  [facility] ([facility] ) [address] [place] [address]  [phone]           Routing refill request to provider for review/approval because:

## 2021-05-18 DIAGNOSIS — I10 BENIGN ESSENTIAL HYPERTENSION: ICD-10-CM

## 2021-05-18 DIAGNOSIS — N40.0 BENIGN PROSTATIC HYPERPLASIA, UNSPECIFIED WHETHER LOWER URINARY TRACT SYMPTOMS PRESENT: ICD-10-CM

## 2021-05-18 RX ORDER — TAMSULOSIN HYDROCHLORIDE 0.4 MG/1
CAPSULE ORAL
Qty: 180 CAPSULE | Refills: 3 | Status: SHIPPED | OUTPATIENT
Start: 2021-05-18 | End: 2022-03-15

## 2021-05-18 NOTE — TELEPHONE ENCOUNTER
tamsulosin 0.4 mg      Last Written Prescription Date:  2/02/21  Last Fill Quantity: 180,   # refills: 3  Last Office Visit: 2/02/21  Future Office visit:    Next 5 appointments (look out 90 days)    May 19, 2021 11:30 AM  (Arrive by 11:15 AM)  Return Visit with Chas Armendariz DO  [facility] ([facility] ) [address] [place] [address]  [phone]           Routing refill request to provider for review/approval because:  Drug not on the [facility], [facility] or [facility] refill protocol or controlled substance

## 2021-05-19 ENCOUNTER — OFFICE VISIT (OUTPATIENT)
Dept: CARDIOLOGY | Facility: OTHER | Age: 71
End: 2021-05-19
Attending: INTERNAL MEDICINE
Payer: COMMERCIAL

## 2021-05-19 VITALS
WEIGHT: 315 LBS | TEMPERATURE: 98 F | HEART RATE: 82 BPM | DIASTOLIC BLOOD PRESSURE: 84 MMHG | OXYGEN SATURATION: 96 % | SYSTOLIC BLOOD PRESSURE: 126 MMHG | BODY MASS INDEX: 43.65 KG/M2

## 2021-05-19 DIAGNOSIS — I42.2 HYPERTROPHIC CARDIOMYOPATHY (H): ICD-10-CM

## 2021-05-19 DIAGNOSIS — N18.31 STAGE 3A CHRONIC KIDNEY DISEASE (H): ICD-10-CM

## 2021-05-19 DIAGNOSIS — E66.01 MORBID OBESITY (H): ICD-10-CM

## 2021-05-19 DIAGNOSIS — I71.21 ASCENDING AORTIC ANEURYSM (H): Primary | ICD-10-CM

## 2021-05-19 DIAGNOSIS — I51.89 DIASTOLIC DYSFUNCTION: ICD-10-CM

## 2021-05-19 DIAGNOSIS — R53.82 CHRONIC FATIGUE: ICD-10-CM

## 2021-05-19 DIAGNOSIS — R06.09 DOE (DYSPNEA ON EXERTION): ICD-10-CM

## 2021-05-19 DIAGNOSIS — I10 BENIGN ESSENTIAL HYPERTENSION: ICD-10-CM

## 2021-05-19 DIAGNOSIS — I51.7 RIGHT VENTRICULAR ENLARGEMENT: ICD-10-CM

## 2021-05-19 DIAGNOSIS — Z86.73 HISTORY OF CVA (CEREBROVASCULAR ACCIDENT): ICD-10-CM

## 2021-05-19 DIAGNOSIS — Z87.891 HISTORY OF TOBACCO ABUSE: ICD-10-CM

## 2021-05-19 DIAGNOSIS — Z91.89 SEDENTARY LIFESTYLE: ICD-10-CM

## 2021-05-19 DIAGNOSIS — R06.02 SOB (SHORTNESS OF BREATH): ICD-10-CM

## 2021-05-19 PROCEDURE — 99215 OFFICE O/P EST HI 40 MIN: CPT | Performed by: INTERNAL MEDICINE

## 2021-05-19 PROCEDURE — G0463 HOSPITAL OUTPT CLINIC VISIT: HCPCS

## 2021-05-19 RX ORDER — VIT C/B6/B5/MAGNESIUM/HERB 173 50-5-6-5MG
CAPSULE ORAL DAILY
COMMUNITY
End: 2023-11-22

## 2021-05-19 ASSESSMENT — PAIN SCALES - GENERAL: PAINLEVEL: NO PAIN (0)

## 2021-05-19 NOTE — NURSING NOTE
"Chief Complaint   Patient presents with     RECHECK       Initial /84 (BP Location: Left arm, Patient Position: Sitting, Cuff Size: Adult Large)   Pulse 82   Temp 98  F (36.7  C) (Tympanic)   Wt (!) 154.2 kg (340 lb)   SpO2 96%   BMI 43.65 kg/m   Estimated body mass index is 43.65 kg/m  as calculated from the following:    Height as of 2/2/21: 1.88 m (6' 2\").    Weight as of this encounter: 154.2 kg (340 lb).  Medication Reconciliation: complete  Kaylen Tucker LPN    "

## 2021-05-19 NOTE — PROGRESS NOTES
[facility] HEART [facility]   CARDIOLOGY PROGRESS NOTE     Chief Complaint   Patient presents with     RECHECK          Diagnosis:  1.  Ascending aortic aneurysm at 4.45 cm on 1/7/19. 4.39 cm on 1/13/20.  2.  HCM at 2.2 cm on 1/7/19. 2.0 cm on 1/13/20.  3.  H/O tobacco abuse smoking cigars quitting in April, 2018.  He smoked approximate 7 days cigars on a daily basis.  4.  Diastolic dysfunction grade 1 on 1/7/19.  5.  Hypertension-controled.  6.  DM-2.  7.  CKD-3.  8.  H/O CVA in March, 2018 with what appears to be unsteady gait.  9.  Sedentary lifestyle.  10.  Morbid obesity      Assessment/Plan:    1.  Salt restriction of 2500 mg, fluid restriction of 2 L, and daily weights.  2.  Repeat echo in 1 year secondary to HCM, TAAA, and diastolic dysfunction grade 1.  3.  With his history of tobacco abuse, he was encouraged to refrain from smoking.  4.  He is rather sedentary at baseline and was encouraged to increase his activity.  If he does not, he may need physical therapy in the future.  We discussed physical therapy but was declined today.  5.  He is to check his blood pressures at home.  If his blood pressures are elevated, he is let us know and we will make changes.  6.  Follow-up in 1 year after completion of echocardiogram..      Interval history:  He is doing well.  His biggest concern is unsteady gait and balance issues.  He is not very active and openly admits this.  He stays in the basement and has a hard time getting up and down the stairs.  We discussed doing physical therapy but was declined.  He was encouraged to increase his activity.  We reviewed his echocardiogram and measurements related to HCM.  He is not symptomatic.  He does not have an obstruction.  We reviewed the symptoms of obstructive hypertrophic cardiomyopathy.  This was also explained to his daughter that first-degree relatives need an echocardiogram every 5 years.  Ideally, he should have genetic testing but this was declined.  We also reviewed  his ascending aorta.  This has been stable.  Both his septum and ascending aortic have been stable in size.  We discussed another echocardiogram in 1 year following and follow-up thereafter.  Again, he was encouraged to increase his activity and this will be revisited at his next appointment.      HPI:    He is known to have HCM with a septal thickness of 2.2 cm on 1/7/19, an ascending aortic aneurysm at 44.5 mm, and diastolic dysfunction grade 1.      He previously had an echo on 6/20/18 with a septal thickness of 2.0 cm and an TAAA of 4.4 cm.       On 1/7/19, his septum was 2.2 cm, had grade 1 diastolic dysfunction, and ascending aortic aneurysm at 44.5 cm.  He has a history of a CVA with ongoing unsteadiness of gait but no dizziness in March, 2018. He is to be a heavy smoker smoking up to 7-8  cigars on a daily basis. He reports he quit smoking in April, 2018. He has hypertension with a blood pressure of 171/91 today.  At home, his blood pressure is running in the 120's/80's regularly.  He is currently on lisinopril 40 mg daily and metoprolol 50 mg twice a day.  He has not had any lower extremity edema or chest pain.  With his history of diastolic dysfunction, it has been suggested he watch his salt intake, fluid intake, and weigh himself on a daily basis.      Relevant testing:  ECHO on 5/3/21:  Global and regional left ventricular function is normal with an EF of 60-65%.  The right ventricle is normal size.  Global right ventricular function is normal.  Mild to moderate left atrial enlargement is present.  Trace mitral insufficiency is present.  Ao valve leaflets not well visualized  Trace tricuspid insufficiency is present.  Ascending aorta 4.3 cm.  Aortic root 4.5 cm        ICD-10-CM    1. Ascending aortic aneurysm at 4.45 cm on echo from 1/7/19  I71.2 Echocardiogram Complete   2. Hypertrophic cardiomyopathy at 2.2 cm  I42.2 Echocardiogram Complete   3. Right ventricular enlargement  I51.7 Echocardiogram  Complete   4. Benign essential hypertension  I10    5. Diastolic dysfunction grade 1 on 1/7/19  I51.89 Echocardiogram Complete   6. SOB (shortness of breath)  R06.02    7. Stage 3a chronic kidney disease  N18.31    8. History of CVA 3/18  Z86.73    9. POWELL (dyspnea on exertion)  R06.00    10. Sedentary lifestyle  Z91.89    11. Morbid obesity (H)  E66.01    12. History of tobacco abuse  Z87.891    13. Chronic fatigue  R53.82        Past Medical History:   Diagnosis Date     Altered gait 4/27/2018     Altered mental status - since approx 1/18 - delayed response, change in mentation 4/27/2018     Benign essential hypertension 4/27/2018     Chronic bilateral low back pain with sciatica 4/27/2018     Chronic fatigue 4/27/2018     Obesity 4/27/2018     Slurred speech x 3 months, CT neg ED [facility] 4/25/18 4/27/2018     Stroke (H)      Type 2 diabetes mellitus with complication, without long-term current use of insulin (H) 4/27/2018     Vision changes x 3 months as of 4/18 4/27/2018       History reviewed. No pertinent surgical history.    No Known Allergies    Current Outpatient Medications   Medication Sig Dispense Refill     amLODIPine (NORVASC) 5 MG tablet TAKE 1 TABLET(5 MG) BY MOUTH DAILY 90 tablet 3     ASPIRIN PO Take 81 mg by mouth daily       atorvastatin (LIPITOR) 40 MG tablet Take 1 tablet (40 mg) by mouth daily 90 tablet 3     Cholecalciferol (VITAMIN D-3) 1000 units CAPS Take 1,000 Units by mouth 2 times daily        CINNAMON PO Take 1,000 mg by mouth daily        Garlic 1000 MG CAPS Take by mouth daily        lisinopril (ZESTRIL) 40 MG tablet Take 1 tablet (40 mg) by mouth daily 90 tablet 3     metFORMIN (GLUCOPHAGE-XR) 500 MG 24 hr tablet TAKE 2 TABLETS(1000 MG) BY MOUTH TWICE DAILY WITH MEALS 360 tablet 3     metoprolol tartrate (LOPRESSOR) 50 MG tablet Take 1 tablet (50 mg) by mouth 2 times daily 180 tablet 3     order for DME Equipment being ordered: Stationary Bike 1 Device 0     tamsulosin (FLOMAX) 0.4  MG capsule TAKE 2 CAPSULES(0.8 MG) BY MOUTH DAILY 180 capsule 3     Turmeric (QC TUMERIC COMPLEX) 500 MG CAPS Take by mouth daily protadim and NRF2       TURMERIC CURCUMIN PO Take 1,500 mg by mouth daily          Social History     Socioeconomic History     Marital status: Single     Spouse name: Not on file     Number of children: Not on file     Years of education: Not on file     Highest education level: Not on file   Occupational History     Not on file   Social Needs     Financial resource strain: Not on file     Food insecurity     Worry: Not on file     Inability: Not on file     Transportation needs     Medical: Not on file     Non-medical: Not on file   Tobacco Use     Smoking status: Former Smoker     Packs/day: 0.50     Types: Cigars     Start date: 2003     Quit date: 2018     Years since quittin.9     Smokeless tobacco: Former User     Quit date: 2021   Substance and Sexual Activity     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      Drug use: No     Sexual activity: Never   Lifestyle     Physical activity     Days per week: Not on file     Minutes per session: Not on file     Stress: Not on file   Relationships     Social connections     Talks on phone: Not on file     Gets together: Not on file     Attends Yazidi service: Not on file     Active member of club or organization: Not on file     Attends meetings of clubs or organizations: Not on file     Relationship status: Not on file     Intimate partner violence     Fear of current or ex partner: Not on file     Emotionally abused: Not on file     Physically abused: Not on file     Forced sexual activity: Not on file   Other Topics Concern     Parent/sibling w/ CABG, MI or angioplasty before 65F 55M? Yes   Social History Narrative     Not on file       LAB RESULTS:   No visits with results within 2 Month(s) from this visit.   Latest known visit with results is:   Office Visit on 2021   Component Date Value Ref Range Status      Cholesterol 02/02/2021 104  <200 mg/dL Final     Triglycerides 02/02/2021 150* <150 mg/dL Final     HDL Cholesterol 02/02/2021 43  >39 mg/dL Final     LDL Cholesterol Calculated 02/02/2021 31  <100 mg/dL Final     Non HDL Cholesterol 02/02/2021 61  <130 mg/dL Final     Sodium 02/02/2021 136  133 - 144 mmol/L Final     Potassium 02/02/2021 4.9  3.4 - 5.3 mmol/L Final     Chloride 02/02/2021 107  94 - 109 mmol/L Final     Carbon Dioxide 02/02/2021 18* 20 - 32 mmol/L Final     Anion Gap 02/02/2021 11  3 - 14 mmol/L Final     Glucose 02/02/2021 184* 70 - 99 mg/dL Final     Urea Nitrogen 02/02/2021 35* 7 - 30 mg/dL Final     Creatinine 02/02/2021 1.76* 0.66 - 1.25 mg/dL Final     GFR Estimate 02/02/2021 38* >60 mL/min/[1.73_m2] Final     GFR Estimate If Black 02/02/2021 45* >60 mL/min/[1.73_m2] Final     Calcium 02/02/2021 10.1  8.5 - 10.1 mg/dL Final     Bilirubin Total 02/02/2021 0.6  0.2 - 1.3 mg/dL Final     Albumin 02/02/2021 4.0  3.4 - 5.0 g/dL Final     Protein Total 02/02/2021 7.4  6.8 - 8.8 g/dL Final     Alkaline Phosphatase 02/02/2021 85  40 - 150 U/L Final     ALT 02/02/2021 42  0 - 70 U/L Final     AST 02/02/2021 13  0 - 45 U/L Final     WBC 02/02/2021 8.9  4.0 - 11.0 10e9/L Final     RBC Count 02/02/2021 4.96  4.4 - 5.9 10e12/L Final     Hemoglobin 02/02/2021 15.8  13.3 - 17.7 g/dL Final     Hematocrit 02/02/2021 45.2  40.0 - 53.0 % Final     MCV 02/02/2021 91  78 - 100 fl Final     MCH 02/02/2021 31.9  26.5 - 33.0 pg Final     MCHC 02/02/2021 35.0  31.5 - 36.5 g/dL Final     RDW 02/02/2021 12.5  10.0 - 15.0 % Final     Platelet Count 02/02/2021 173  150 - 450 10e9/L Final     % Neutrophils 02/02/2021 65.2  % Final     % Lymphocytes 02/02/2021 26.0  % Final     % Monocytes 02/02/2021 6.8  % Final     % Eosinophils 02/02/2021 1.5  % Final     % Basophils 02/02/2021 0.5  % Final     Absolute Neutrophil 02/02/2021 5.8  1.6 - 8.3 10e9/L Final     Absolute Lymphocytes 02/02/2021 2.3  0.8 - 5.3 10e9/L Final      Absolute Monocytes 02/02/2021 0.6  0.0 - 1.3 10e9/L Final     Absolute Eosinophils 02/02/2021 0.1  0.0 - 0.7 10e9/L Final     Absolute Basophils 02/02/2021 0.0  0.0 - 0.2 10e9/L Final     Diff Method 02/02/2021 Automated Method   Final     Hemoglobin A1C 02/02/2021 7.4* 0 - 5.6 % Final     Estimated Average Glucose 02/02/2021 166  mg/dL Final        Review of systems: Negative except that which was noted in the HPI.    Physical examination:  /84 (BP Location: Left arm, Patient Position: Sitting, Cuff Size: Adult Large)   Pulse 82   Temp 98  F (36.7  C) (Tympanic)   Wt (!) 154.2 kg (340 lb)   SpO2 96%   BMI 43.65 kg/m      GENERAL APPEARANCE: healthy, alert and no distress  HEENT: no icterus, no xanthelasmas, normal pupil size and reaction, no cyanosis.  NECK: no adenopathy, no asymmetry, masses.  CHEST: lungs clear to auscultation - no rales, rhonchi or wheezes, no use of accessory muscles, no retractions, respirations are unlabored, normal respiratory rate  CARDIOVASCULAR: regular rhythm, normal S1 with physiologic split S2, no S3 or S4 and no murmur, click or rub  EXTREMITIES: no clubbing, cyanosis or edema  NEURO: alert and oriented normal speech, and affect  VASC: No vascular bruits heard.  SKIN: no ecchymoses, no rashes        Thank you for allowing me to participate in the care of your patient. Please do not hesitate to contact me if you have any questions.     Chas Armendariz, DO

## 2021-05-19 NOTE — PATIENT INSTRUCTIONS
Thank you for allowing Dr. Armendariz and our  team to participate in your care. Please call our office at [phone] with scheduling questions or if you need to cancel or change your appointment. With any other questions or concerns you may call [facility] cardiology nurse at [phone].       If you experience chest pain, chest pressure, chest tightness, shortness of breath, fainting, lightheadedness, nausea, vomiting, or other concerning symptoms, please report to the Emergency Department or call 911. These symptoms may be emergent, and best treated in the Emergency Department.    Follow up in 1 year    You will have an echocardiogram performed.  This is an ultrasound of the heart, that evaluates heart function.  The hospital scheduling department will call to schedule you for this test.     Increase your activity.

## 2021-06-25 ENCOUNTER — OFFICE VISIT (OUTPATIENT)
Dept: NEUROLOGY | Facility: CLINIC | Age: 71
End: 2021-06-25
Payer: COMMERCIAL

## 2021-06-25 VITALS
RESPIRATION RATE: 16 BRPM | SYSTOLIC BLOOD PRESSURE: 152 MMHG | BODY MASS INDEX: 45.32 KG/M2 | HEART RATE: 79 BPM | WEIGHT: 315 LBS | DIASTOLIC BLOOD PRESSURE: 84 MMHG | OXYGEN SATURATION: 95 %

## 2021-06-25 DIAGNOSIS — I63.111 CEREBROVASCULAR ACCIDENT (CVA) DUE TO EMBOLISM OF RIGHT VERTEBRAL ARTERY (H): Primary | ICD-10-CM

## 2021-06-25 PROCEDURE — 99214 OFFICE O/P EST MOD 30 MIN: CPT | Performed by: PSYCHIATRY & NEUROLOGY

## 2021-06-25 ASSESSMENT — PAIN SCALES - GENERAL: PAINLEVEL: NO PAIN (0)

## 2021-06-25 NOTE — PATIENT INSTRUCTIONS
You will need further follow up with your PCP for weight management, and they can order a nutritionist or dietician for aide.  I will order PT to aide in your deconditioning improvement.    Your exam is relatively similar to prior post-stroke evaluations, except I do note some right eyelid ptosis (droop) as well as pain with walking.  You should have repeat MRI brain to determine if your old stroke has progressed as it should, or if new strokes are present.

## 2021-06-25 NOTE — PROGRESS NOTES
[facility] Neurology Follow Up Visit    Deon Culver MRN# [id number]   Age: 71 year old YOB: 1950     Brief history of symptoms: The patient was initially seen in neurologic consultation on 11/23/2020 with myself for evaluation of CVA (left medullary), and chronic lumbar radiculopathy which was non-symptomatic at the time . Please see the comprehensive neurologic consultation notes from those dates in the Epic records for details.  He was recommended to have PT for deconditioning, and a repeat MRI to monitor prior stroke progression.    Interval history: MRI brain was not done    Today, The patient isn't doing PT and rides a work-out bicycle.  He didn't feel that PT was helpful.  The patient feels that stairs are more difficult to go up and down on.  He feels that has gained a large degree of weight, which is contributing to his difficulty with walking. He doesn't have any shooting pain in his back to his legs.       Physical Exam:   Vitals: BP (!) 152/84   Pulse 79   Resp 16   Wt (!) 160.1 kg (353 lb)   SpO2 95%   BMI 45.32 kg/m    General: Seated comfortably in no acute distress.  HEENT: Neck supple with normal range of motion.   Skin: No rashes  Neurologic:     Mental Status: Fully alert, attentive and oriented. Speech clear and fluent, no paraphasic errors.      Cranial Nerves: EOMI with normal smooth pursuit. Ptosis over left eye, pupil is reactive. Facial movements symmetric. Hearing not formally tested but intact to conversation. Subtle vagal/glossopharyngeal Cah phoneme slurring, no cerebellar slurring.     Motor: No tremors or other abnormal movements observed.      Sensory:Negative Romberg.      DTR: 1+ patellar b/l, absent achilles.     Coordination: Finger-nose-finger without dysmetria.     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  I suspect this is related to diabetes versus another infarction, but we will obtain a repeat MRI in any case considering the prior recommended imaging was never done.  Overall, the patient doesn't describe woresend or new stroke symptoms or radicular symptoms, and would benefit most from weight loss.  He is agreeable to having his PCP order a dietician or nutritionist consult, and will attend PT if I order it for him.     Plan:  PT  MRI brain w/wout contrast  PCP to consider nutritionist or dietician  Consider Ophthalmology referral if ptosis increases or diplopia is noted    Follow up in Neurology clinic in one year (virtually) or earlier as needed should new concerns arise.    JIA Fernandez D.O.   of Neurology    Total time today (31 min) in this patient encounter was spent on pre-charting, counseling and/or coordination of care.

## 2021-07-06 NOTE — PROGRESS NOTES
"    Assessment & Plan   Problem List Items Addressed This Visit        Circulatory    Benign essential hypertension    Relevant Medications    amLODIPine (NORVASC) 10 MG tablet    Other Relevant Orders    Hemoglobin A1c (Completed)    Ascending aortic aneurysm at 4.45 cm on echo from 1/7/19       Other    History of CVA 3/18      Other Visit Diagnoses     Stage 3 chronic kidney disease, unspecified whether stage 3a or 3b CKD    -  Primary    Relevant Orders    Comprehensive metabolic panel (BMP + Alb, Alk Phos, ALT, AST, Total. Bili, TP) (Completed)             25 minutes spent on the date of the encounter doing chart review, review of outside records, review of test results, interpretation of tests, patient visit and documentation        BMI:   Estimated body mass index is 43.53 kg/m  as calculated from the following:    Height as of 2/2/21: 1.88 m (6' 2\").    Weight as of this encounter: 153.8 kg (339 lb).         Sreedhar Mendieta, [facility]   Mendoza is a 71 year old who presents for the following health issues     HPI   Mendoza presents today for routine follow up and to assess his HTN, CVA, CKD stage 3 and type 2 diabetes.  Lately he has had some elevated BPs but at times his BPs are in normal range.  He denies any chest pain or SOB. PT and nutrition were recommended by Neurology but he declines.  Denies chest pain or SOB.   He is agreeable to setting up an eye appointment.      Hypertension Follow-up      Do you check your blood pressure regularly outside of the clinic? Yes     Are you following a low salt diet? Yes    Are your blood pressures ever more than 140 on the top number (systolic) OR more   than 90 on the bottom number (diastolic), for example 140/90? Yes          Review of Systems   Constitutional, HEENT, cardiovascular, pulmonary, gi and gu systems are negative, except as otherwise noted.      Objective    /88   Pulse 77   Temp 96.3  F (35.7  C) " (Tympanic)   Wt (!) 153.8 kg (339 lb)   SpO2 98%   BMI 43.53 kg/m    Body mass index is 43.53 kg/m .  Physical Exam       Office Visit on 02/02/2021   Component Date Value Ref Range Status     Cholesterol 02/02/2021 104  <200 mg/dL Final     Triglycerides 02/02/2021 150* <150 mg/dL Final    Comment: Borderline high:  150-199 mg/dl  High:             200-499 mg/dl  Very high:       >499 mg/dl  Fasting specimen       HDL Cholesterol 02/02/2021 43  >39 mg/dL Final     LDL Cholesterol Calculated 02/02/2021 31  <100 mg/dL Final    Desirable:       <100 mg/dl     Non HDL Cholesterol 02/02/2021 61  <130 mg/dL Final     Sodium 02/02/2021 136  133 - 144 mmol/L Final     Potassium 02/02/2021 4.9  3.4 - 5.3 mmol/L Final     Chloride 02/02/2021 107  94 - 109 mmol/L Final     Carbon Dioxide 02/02/2021 18* 20 - 32 mmol/L Final     Anion Gap 02/02/2021 11  3 - 14 mmol/L Final     Glucose 02/02/2021 184* 70 - 99 mg/dL Final    Fasting specimen     Urea Nitrogen 02/02/2021 35* 7 - 30 mg/dL Final     Creatinine 02/02/2021 1.76* 0.66 - 1.25 mg/dL Final     GFR Estimate 02/02/2021 38* >60 mL/min/[1.73_m2] Final    Comment: Non  GFR Calc  Starting 12/18/2018, serum creatinine based estimated GFR (eGFR) will be   calculated using the Chronic Kidney Disease Epidemiology Collaboration   (CKD-EPI) equation.       GFR Estimate If Black 02/02/2021 45* >60 mL/min/[1.73_m2] Final    Comment:  GFR Calc  Starting 12/18/2018, serum creatinine based estimated GFR (eGFR) will be   calculated using the Chronic Kidney Disease Epidemiology Collaboration   (CKD-EPI) equation.       Calcium 02/02/2021 10.1  8.5 - 10.1 mg/dL Final     Bilirubin Total 02/02/2021 0.6  0.2 - 1.3 mg/dL Final     Albumin 02/02/2021 4.0  3.4 - 5.0 g/dL Final     Protein Total 02/02/2021 7.4  6.8 - 8.8 g/dL Final     Alkaline Phosphatase 02/02/2021 85  40 - 150 U/L Final     ALT 02/02/2021 42  0 - 70 U/L Final     AST 02/02/2021 13  0 - 45 U/L  Final     WBC 02/02/2021 8.9  4.0 - 11.0 10e9/L Final     RBC Count 02/02/2021 4.96  4.4 - 5.9 10e12/L Final     Hemoglobin 02/02/2021 15.8  13.3 - 17.7 g/dL Final     Hematocrit 02/02/2021 45.2  40.0 - 53.0 % Final     MCV 02/02/2021 91  78 - 100 fl Final     MCH 02/02/2021 31.9  26.5 - 33.0 pg Final     MCHC 02/02/2021 35.0  31.5 - 36.5 g/dL Final     RDW 02/02/2021 12.5  10.0 - 15.0 % Final     Platelet Count 02/02/2021 173  150 - 450 10e9/L Final     % Neutrophils 02/02/2021 65.2  % Final     % Lymphocytes 02/02/2021 26.0  % Final     % Monocytes 02/02/2021 6.8  % Final     % Eosinophils 02/02/2021 1.5  % Final     % Basophils 02/02/2021 0.5  % Final     Absolute Neutrophil 02/02/2021 5.8  1.6 - 8.3 10e9/L Final     Absolute Lymphocytes 02/02/2021 2.3  0.8 - 5.3 10e9/L Final     Absolute Monocytes 02/02/2021 0.6  0.0 - 1.3 10e9/L Final     Absolute Eosinophils 02/02/2021 0.1  0.0 - 0.7 10e9/L Final     Absolute Basophils 02/02/2021 0.0  0.0 - 0.2 10e9/L Final     Diff Method 02/02/2021 Automated Method   Final     Hemoglobin A1C 02/02/2021 7.4* 0 - 5.6 % Final    Comment: Normal <5.7% Prediabetes 5.7-6.4%  Diabetes 6.5% or higher - adopted from ADA   consensus guidelines.       Estimated Average Glucose 02/02/2021 166  mg/dL Final     No results found for any visits on 07/07/21.  No results found for this or any previous visit (from the past 24 hour(s)).

## 2021-07-07 ENCOUNTER — OFFICE VISIT (OUTPATIENT)
Dept: INTERNAL MEDICINE | Facility: OTHER | Age: 71
End: 2021-07-07
Attending: INTERNAL MEDICINE
Payer: COMMERCIAL

## 2021-07-07 VITALS
SYSTOLIC BLOOD PRESSURE: 122 MMHG | OXYGEN SATURATION: 98 % | WEIGHT: 315 LBS | BODY MASS INDEX: 43.53 KG/M2 | DIASTOLIC BLOOD PRESSURE: 88 MMHG | HEART RATE: 77 BPM | TEMPERATURE: 96.3 F

## 2021-07-07 DIAGNOSIS — N18.30 STAGE 3 CHRONIC KIDNEY DISEASE, UNSPECIFIED WHETHER STAGE 3A OR 3B CKD (H): Primary | ICD-10-CM

## 2021-07-07 DIAGNOSIS — I71.21 ASCENDING AORTIC ANEURYSM (H): ICD-10-CM

## 2021-07-07 DIAGNOSIS — Z86.73 HISTORY OF CVA (CEREBROVASCULAR ACCIDENT): ICD-10-CM

## 2021-07-07 DIAGNOSIS — I10 BENIGN ESSENTIAL HYPERTENSION: ICD-10-CM

## 2021-07-07 PROCEDURE — 36415 COLL VENOUS BLD VENIPUNCTURE: CPT | Mod: ZL | Performed by: INTERNAL MEDICINE

## 2021-07-07 PROCEDURE — 99214 OFFICE O/P EST MOD 30 MIN: CPT | Performed by: INTERNAL MEDICINE

## 2021-07-07 PROCEDURE — 999N001182 HC STATISTIC ESTIMATED AVERAGE GLUCOSE: Mod: ZL | Performed by: INTERNAL MEDICINE

## 2021-07-07 PROCEDURE — G0463 HOSPITAL OUTPT CLINIC VISIT: HCPCS

## 2021-07-07 PROCEDURE — 80053 COMPREHEN METABOLIC PANEL: CPT | Mod: ZL | Performed by: INTERNAL MEDICINE

## 2021-07-07 PROCEDURE — 83036 HEMOGLOBIN GLYCOSYLATED A1C: CPT | Mod: ZL | Performed by: INTERNAL MEDICINE

## 2021-07-07 RX ORDER — METOPROLOL TARTRATE 100 MG
100 TABLET ORAL 2 TIMES DAILY
Qty: 60 TABLET | Refills: 1 | Status: CANCELLED | OUTPATIENT
Start: 2021-07-07

## 2021-07-07 RX ORDER — AMLODIPINE BESYLATE 10 MG/1
TABLET ORAL
Qty: 90 TABLET | Refills: 1 | Status: SHIPPED | OUTPATIENT
Start: 2021-07-07 | End: 2021-10-22

## 2021-07-07 RX ORDER — AMLODIPINE BESYLATE 10 MG/1
10 TABLET ORAL DAILY
Qty: 30 TABLET | Refills: 3 | Status: SHIPPED | OUTPATIENT
Start: 2021-07-07 | End: 2021-07-07

## 2021-07-07 ASSESSMENT — PAIN SCALES - GENERAL: PAINLEVEL: NO PAIN (0)

## 2021-07-07 NOTE — TELEPHONE ENCOUNTER
Norvasc 10mg      Last Written Prescription Date:  7/7/21  Last Fill Quantity: 30,   # refills: 3  Last Office Visit: 2/2/21  Future Office visit:       Routing refill request to provider for review/approval because:

## 2021-07-07 NOTE — NURSING NOTE
"Chief Complaint   Patient presents with     Hypertension       Initial BP (!) 146/100 (BP Location: Left arm, Patient Position: Chair, Cuff Size: Adult Large)   Pulse 77   Temp 96.3  F (35.7  C) (Tympanic)   Wt (!) 153.8 kg (339 lb)   SpO2 98%   BMI 43.53 kg/m   Estimated body mass index is 43.53 kg/m  as calculated from the following:    Height as of 2/2/21: 1.88 m (6' 2\").    Weight as of this encounter: 153.8 kg (339 lb).  Medication Reconciliation: complete  EMELYN SEALS LPN  "

## 2021-07-08 LAB
ALBUMIN SERPL-MCNC: 4.1 G/DL (ref 3.4–5)
ALP SERPL-CCNC: 84 U/L (ref 40–150)
ALT SERPL W P-5'-P-CCNC: 49 U/L (ref 0–70)
ANION GAP SERPL CALCULATED.3IONS-SCNC: 11 MMOL/L (ref 3–14)
AST SERPL W P-5'-P-CCNC: 18 U/L (ref 0–45)
BILIRUB SERPL-MCNC: 0.5 MG/DL (ref 0.2–1.3)
BUN SERPL-MCNC: 40 MG/DL (ref 7–30)
CALCIUM SERPL-MCNC: 9.9 MG/DL (ref 8.5–10.1)
CHLORIDE SERPL-SCNC: 105 MMOL/L (ref 94–109)
CO2 SERPL-SCNC: 20 MMOL/L (ref 20–32)
CREAT SERPL-MCNC: 1.73 MG/DL (ref 0.66–1.25)
EST. AVERAGE GLUCOSE BLD GHB EST-MCNC: 169 MG/DL
GFR SERPL CREATININE-BSD FRML MDRD: 39 ML/MIN/{1.73_M2}
GLUCOSE SERPL-MCNC: 175 MG/DL (ref 70–99)
HBA1C MFR BLD: 7.5 % (ref 0–5.6)
POTASSIUM SERPL-SCNC: 4.4 MMOL/L (ref 3.4–5.3)
PROT SERPL-MCNC: 7.5 G/DL (ref 6.8–8.8)
SODIUM SERPL-SCNC: 136 MMOL/L (ref 133–144)

## 2021-07-14 ENCOUNTER — HOSPITAL ENCOUNTER (OUTPATIENT)
Dept: MRI IMAGING | Facility: HOSPITAL | Age: 71
Discharge: HOME | End: 2021-07-14
Attending: PSYCHIATRY & NEUROLOGY | Admitting: PSYCHIATRY & NEUROLOGY
Payer: COMMERCIAL

## 2021-07-14 DIAGNOSIS — I63.111 CEREBROVASCULAR ACCIDENT (CVA) DUE TO EMBOLISM OF RIGHT VERTEBRAL ARTERY (H): ICD-10-CM

## 2021-07-14 PROCEDURE — 70551 MRI BRAIN STEM W/O DYE: CPT

## 2021-08-09 ENCOUNTER — MYC MEDICAL ADVICE (OUTPATIENT)
Dept: INTERNAL MEDICINE | Facility: OTHER | Age: 71
End: 2021-08-09

## 2021-08-09 DIAGNOSIS — L98.9 SKIN LESION: Primary | ICD-10-CM

## 2021-09-08 DIAGNOSIS — Z86.73 HISTORY OF CVA (CEREBROVASCULAR ACCIDENT): ICD-10-CM

## 2021-09-08 DIAGNOSIS — R47.81 SLURRED SPEECH: ICD-10-CM

## 2021-09-08 DIAGNOSIS — E11.8 TYPE 2 DIABETES MELLITUS WITH COMPLICATION, WITHOUT LONG-TERM CURRENT USE OF INSULIN (H): ICD-10-CM

## 2021-09-08 DIAGNOSIS — E78.2 MIXED HYPERLIPIDEMIA: ICD-10-CM

## 2021-09-09 NOTE — TELEPHONE ENCOUNTER
Lipitor      Last Written Prescription Date:  11/16/20  Last Fill Quantity: 90,   # refills: 3  Last Office Visit: 7/7/21  Future Office visit:       Routing refill request to provider for review/approval because:        Metformin      Last Written Prescription Date:  11/16/20  Last Fill Quantity: 360,   # refills: 3  Last Office Visit: 7/7/21  Future Office visit:       Routing refill request to provider for review/approval because:

## 2021-09-10 RX ORDER — ATORVASTATIN CALCIUM 40 MG/1
TABLET, FILM COATED ORAL
Qty: 90 TABLET | Refills: 3 | Status: SHIPPED | OUTPATIENT
Start: 2021-09-10 | End: 2022-04-11

## 2021-09-10 RX ORDER — METFORMIN HCL 500 MG
TABLET, EXTENDED RELEASE 24 HR ORAL
Qty: 360 TABLET | Refills: 3 | Status: SHIPPED | OUTPATIENT
Start: 2021-09-10 | End: 2022-04-11

## 2021-10-03 ENCOUNTER — HEALTH MAINTENANCE LETTER (OUTPATIENT)
Age: 71
End: 2021-10-03

## 2021-10-21 DIAGNOSIS — I10 BENIGN ESSENTIAL HYPERTENSION: ICD-10-CM

## 2021-10-22 RX ORDER — AMLODIPINE BESYLATE 10 MG/1
TABLET ORAL
Qty: 30 TABLET | Refills: 1 | Status: SHIPPED | OUTPATIENT
Start: 2021-10-22 | End: 2021-11-19

## 2021-10-22 NOTE — TELEPHONE ENCOUNTER
Norvasc      Last Written Prescription Date:  7/7/21  Last Fill Quantity: 90,   # refills: 1  Last Office Visit: 7/7/21  Future Office visit:       Routing refill request to provider for review/approval because:

## 2021-11-17 ENCOUNTER — OFFICE VISIT (OUTPATIENT)
Dept: PODIATRY | Facility: OTHER | Age: 71
End: 2021-11-17
Attending: PODIATRIST
Payer: COMMERCIAL

## 2021-11-17 VITALS
HEART RATE: 88 BPM | SYSTOLIC BLOOD PRESSURE: 140 MMHG | TEMPERATURE: 97.1 F | OXYGEN SATURATION: 96 % | DIASTOLIC BLOOD PRESSURE: 85 MMHG

## 2021-11-17 DIAGNOSIS — N18.32 STAGE 3B CHRONIC KIDNEY DISEASE (H): ICD-10-CM

## 2021-11-17 DIAGNOSIS — M21.41 PES PLANUS OF BOTH FEET: ICD-10-CM

## 2021-11-17 DIAGNOSIS — E11.9 DIABETES MELLITUS TYPE 2, NONINSULIN DEPENDENT (H): ICD-10-CM

## 2021-11-17 DIAGNOSIS — Z86.73 HISTORY OF CVA (CEREBROVASCULAR ACCIDENT): ICD-10-CM

## 2021-11-17 DIAGNOSIS — L85.3 XEROSIS OF SKIN: ICD-10-CM

## 2021-11-17 DIAGNOSIS — L60.3 ONYCHODYSTROPHY: Primary | ICD-10-CM

## 2021-11-17 DIAGNOSIS — M21.42 PES PLANUS OF BOTH FEET: ICD-10-CM

## 2021-11-17 DIAGNOSIS — E11.42 DIABETIC POLYNEUROPATHY ASSOCIATED WITH TYPE 2 DIABETES MELLITUS (H): ICD-10-CM

## 2021-11-17 PROCEDURE — G0463 HOSPITAL OUTPT CLINIC VISIT: HCPCS | Mod: 25

## 2021-11-17 PROCEDURE — 11721 DEBRIDE NAIL 6 OR MORE: CPT | Performed by: PODIATRIST

## 2021-11-17 PROCEDURE — 99213 OFFICE O/P EST LOW 20 MIN: CPT | Mod: 25 | Performed by: PODIATRIST

## 2021-11-17 ASSESSMENT — PAIN SCALES - GENERAL: PAINLEVEL: NO PAIN (0)

## 2021-11-17 NOTE — PROGRESS NOTES
Chief complaint: Patient presents with:  Toenail: DFE      History of Present Illness: This 69 year old NIDDM male is seen for follow-up management of elongated toenails and a diabetic foot exam.     Patient says he has a lot of numbness in his feet. He is wondering if there are any creams to treat this. He does have one cream he uses which helps a little. He gets a lot of numbness in his feet.    He is using Diabetic Gold Bond Foot Lotion once a week because he thinks he has dry skin on his feet. He has moderate flaking of the skin on both feet.    Patient is also here for a diabetic foot exam and high risk nail debridement. He has not had his toenails trimmed in ten months because he says he didn't think they needed to be trimmed earlier than this.    No further pedal complaints today.       Last HbA1C was 7.5% on 07/07/2021.        BP (!) 140/85 (BP Location: Left arm, Patient Position: Sitting, Cuff Size: Adult Large)   Pulse 88   Temp 97.1  F (36.2  C) (Tympanic)   SpO2 96%     Patient Active Problem List   Diagnosis     Benign essential hypertension     Obesity     Type 2 diabetes mellitus      Chronic fatigue     Slurred speech x 3 months, CT neg ED [facility] 4/25/18     Vision changes x 3 months as of 4/18     Chronic bilateral low back pain with sciatica     Altered gait     Altered mental status - since approx 1/18 - delayed response, change in mentation     Hypertrophic cardiomyopathy at 2.2 cm     Ascending aortic aneurysm at 4.45 cm on echo from 1/7/19     SOB (shortness of breath)     Right ventricular enlargement     Stage 3a chronic kidney disease (H)     History of tobacco abuse     H/O ETOH abuse     History of 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 No pertinent surgical history.    Current Outpatient Medications   Medication     amLODIPine (NORVASC) 10 MG tablet     ASPIRIN PO     atorvastatin (LIPITOR) 40 MG tablet     Cholecalciferol (VITAMIN D-3) 1000 units CAPS     CINNAMON PO     Garlic 1000 MG CAPS     lisinopril (ZESTRIL) 40 MG tablet     metFORMIN (GLUCOPHAGE-XR) 500 MG 24 hr tablet     metoprolol tartrate (LOPRESSOR) 50 MG tablet     order for DME     tamsulosin (FLOMAX) 0.4 MG capsule     Turmeric (QC TUMERIC COMPLEX) 500 MG CAPS     TURMERIC CURCUMIN PO     No current facility-administered medications for this visit.     Facility-Administered Medications Ordered in Other Visits   Medication     iopamidol (ISOVUE-370) solution 75 mL        No Known Allergies    Family History   Problem Relation Age of Onset     Colon Cancer Mother      Kidney Cancer Father      Kidney Disease Father      Coronary Artery Disease Brother      Pulmonary Embolism Brother      Coronary Artery Disease Brother      Myocardial Infarction Brother        Social History     Socioeconomic History     Marital status: Single     Spouse name: None     Number of children: None     Years of education: None     Highest education level: None   Occupational History     None   Social Needs     Financial resource strain: None     Food insecurity:     Worry: None     Inability: None     Transportation needs:     Medical: None     Non-medical: None   Tobacco Use     Smoking status: Current Some Day Smoker     Packs/day: 0.50     Types: Cigars     Start date: 2003     Last attempt to quit: 2018     Years since quittin.2     Smokeless tobacco: Never Used   Substance and Sexual Activity     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      Drug use: No     Sexual activity: Never   Lifestyle     Physical activity:     Days per week: None     Minutes per session: None     Stress: None   Relationships     Social connections:     Talks on phone: None      Gets together: None     Attends Restorationist service: None     Active member of club or organization: None     Attends meetings of clubs or organizations: None     Relationship status: None     Intimate partner violence:     Fear of current or ex partner: None     Emotionally abused: None     Physically abused: None     Forced sexual activity: None   Other Topics Concern     Parent/sibling w/ CABG, MI or angioplasty before 65F 55M? Yes   Social History Narrative     None       ROS: 10 point ROS neg other than the symptoms noted above in the HPI.  EXAM  Constitutional: healthy, alert and no distress    Psychiatric: mentation appears normal and affect normal/bright    VASCULAR:  -Dorsalis pedis pulse +1/4 b/l  -Posterior tibial pulse +1/4 b/l  -Capillary refill time < 3 seconds to b/l hallux  -Hair growth Absent to b/l anterior legs and ankles  NEURO:  -Protective sensation intact with SWM +10/10 RIGHT and +10/10 LEFT on 11/17/2021  -Protective sensation intact with SWM +8/10 RIGHT and +8/10 LEFT on 01/15/2020  -Protective sensation intact with SWM +7/10 RIGHT and +9/10 LEFT on 09/18/2019  -Light touch sensation intact to b/l plantar forefoot  DERM:  -Skin diffusely dry and flaking to bilateral feet  ---Excess, large flakes of skin on the dorsal toes, plantar forefoot and plantar heels  ---No open wounds through the skin today  -Skin temperature within normal limits bilaterally   -Skin mildly dry to bilateral feet  -Toenails elongated, thickened, dystrophic and discolored x 10 with subungual debris    MSK:  -Moderate decrease in arch height while patient is NWB  -Muscle strength of ankles +5/5 for dorsiflexion, plantarflexion, ABDUction and ADDuction b/l    ============================================================    ASSESSMENT:  (L60.3) Onychodystrophy  (primary encounter diagnosis)    (L85.3) Xerosis of skin    (M21.41,  M21.42) Pes planus of both feet    (E11.9) Diabetes mellitus type 2, noninsulin dependent  (H)    (E11.42) Diabetic polyneuropathy associated with type 2 diabetes mellitus (H)    (Z68.35) BMI 35.0-35.9,adult    (Z86.73) History of CVA 3/18    (N18.3) Chronic kidney disease, stage 3 (moderate) (H)        PLAN:  -Patient evaluated and examined. Treatment options discussed with no educational barriers noted.  -Diabetic Foot Education provided. This included checking the feet daily looking for new new blisters or wounds, wearing shoes at all times when walking including around the house, and avoiding lotion application between the toes. Any sign of infection in the foot warrant's the patient presenting to the ED as soon as possible.  -High risk toenail debridement x 10 toenails without incident   -DM foot exam for patient with sensation  -Orthotist referral for DM shoes and inserts in [place] location was placed on 09/18/2019 -- patient declined more DM shoes today.    -Xerosis of skin: Discussed xerosis of the skin including the risks of dry, cracking skin creating ulcerations on the feet. These can be painful or can become infected which can then potentially lead to life threatening wounds or amputation. It is important to continue with daily moisturizing lotion to prevent this. Patient expressed understanding.   ---Patient has diffuse flaking of skin. He is using Diabetic Gold Bond Foot Lotion once per week but this is not helping. He is advised to increase the use of the cream to daily. He is at risk for open fissures with the extent of flaking on his skin. Patient expressed understanding.    -Neuropathy pain education:   ---Patient's description of foot pain / concern is more a description of neuropathy than musculoskeletal pain. Explained and educated the patient on the difference between musculoskeletal pain and neuropathy pain. Nerve pain can be caused my a multitude of factors. These factors are not limited to the following but can include include diabetes, back injuries, and certain medications.  This can be more challenging to treat since it is not an injury or musculoskeletal issue that may respond better to an insert or injection.  ---Numbness can rarely be fully treated, but the burning and tingling can sometimes be managed with either certain creams or with medication management.   ---Capsaicin is a cream that is directed more at helping with burning and tingling of the feet, but will unlikely address the numbness. Patient does not want to try the cream at this time.      -Patient in agreement with the above treatment plan and all of patient's questions were answered.      RTC 6 months for diabetic foot exam and high risk nail debridement        Leonie Og DPM

## 2021-11-17 NOTE — NURSING NOTE
"Chief Complaint   Patient presents with     Toenail     DFE       Initial BP (!) 140/85 (BP Location: Left arm, Patient Position: Sitting, Cuff Size: Adult Large)   Pulse 88   Temp 97.1  F (36.2  C) (Tympanic)   SpO2 96%  Estimated body mass index is 43.53 kg/m  as calculated from the following:    Height as of 2/2/21: 1.88 m (6' 2\").    Weight as of 7/7/21: 153.8 kg (339 lb).  Medication Reconciliation: complete  Joy August  "

## 2021-11-18 DIAGNOSIS — I10 BENIGN ESSENTIAL HYPERTENSION: ICD-10-CM

## 2021-11-19 RX ORDER — AMLODIPINE BESYLATE 10 MG/1
TABLET ORAL
Qty: 30 TABLET | Refills: 1 | Status: SHIPPED | OUTPATIENT
Start: 2021-11-19 | End: 2022-01-05

## 2021-11-19 NOTE — TELEPHONE ENCOUNTER
Norvasc      Last Written Prescription Date:  10/22/21  Last Fill Quantity: 30,   # refills: 1  Last Office Visit: 7/7/21  Future Office visit:       Routing refill request to provider for review/approval because:

## 2022-01-05 DIAGNOSIS — I10 BENIGN ESSENTIAL HYPERTENSION: ICD-10-CM

## 2022-01-05 RX ORDER — AMLODIPINE BESYLATE 10 MG/1
TABLET ORAL
Qty: 90 TABLET | Refills: 1 | Status: SHIPPED | OUTPATIENT
Start: 2022-01-05 | End: 2022-05-06

## 2022-01-22 ENCOUNTER — HEALTH MAINTENANCE LETTER (OUTPATIENT)
Age: 72
End: 2022-01-22

## 2022-02-17 PROBLEM — N18.31 STAGE 3A CHRONIC KIDNEY DISEASE (H): Status: ACTIVE | Noted: 2018-07-06

## 2022-03-14 DIAGNOSIS — N40.0 BENIGN PROSTATIC HYPERPLASIA, UNSPECIFIED WHETHER LOWER URINARY TRACT SYMPTOMS PRESENT: ICD-10-CM

## 2022-03-14 DIAGNOSIS — I10 BENIGN ESSENTIAL HYPERTENSION: ICD-10-CM

## 2022-03-15 RX ORDER — TAMSULOSIN HYDROCHLORIDE 0.4 MG/1
CAPSULE ORAL
Qty: 180 CAPSULE | Refills: 3 | Status: SHIPPED | OUTPATIENT
Start: 2022-03-15 | End: 2022-05-23

## 2022-03-15 NOTE — TELEPHONE ENCOUNTER
Tamsulosin      Last Written Prescription Date:  5/18/21  Last Fill Quantity: 180,   # refills: 3  Last Office Visit: 11/17/21  Future Office visit:    Next 5 appointments (look out 90 days)    May 04, 2022 10:30 AM  (Arrive by 10:15 AM)  Return Visit with Leonie Og DPM  [facility] ([facility] ) [address] [place] [address] [phone]   May 23, 2022 11:00 AM  (Arrive by 10:45 AM)  Return Visit with Chas Armendariz DO  [facility] ([facility] ) [address] [place] [address] [place]  [place] [address] [phone]

## 2022-04-11 ENCOUNTER — OFFICE VISIT (OUTPATIENT)
Dept: INTERNAL MEDICINE | Facility: OTHER | Age: 72
End: 2022-04-11
Attending: INTERNAL MEDICINE
Payer: COMMERCIAL

## 2022-04-11 VITALS
WEIGHT: 315 LBS | HEART RATE: 61 BPM | BODY MASS INDEX: 42.91 KG/M2 | TEMPERATURE: 96.2 F | OXYGEN SATURATION: 97 % | SYSTOLIC BLOOD PRESSURE: 116 MMHG | DIASTOLIC BLOOD PRESSURE: 82 MMHG

## 2022-04-11 DIAGNOSIS — Z12.5 SCREENING FOR PROSTATE CANCER: ICD-10-CM

## 2022-04-11 DIAGNOSIS — R47.81 SLURRED SPEECH: ICD-10-CM

## 2022-04-11 DIAGNOSIS — E78.5 HYPERLIPIDEMIA LDL GOAL <100: ICD-10-CM

## 2022-04-11 DIAGNOSIS — Z86.73 HISTORY OF CVA (CEREBROVASCULAR ACCIDENT): ICD-10-CM

## 2022-04-11 DIAGNOSIS — E11.9 TYPE 2 DIABETES, HBA1C GOAL < 7% (H): Primary | ICD-10-CM

## 2022-04-11 DIAGNOSIS — E11.8 TYPE 2 DIABETES MELLITUS WITH COMPLICATION, WITHOUT LONG-TERM CURRENT USE OF INSULIN (H): ICD-10-CM

## 2022-04-11 DIAGNOSIS — E78.2 MIXED HYPERLIPIDEMIA: ICD-10-CM

## 2022-04-11 DIAGNOSIS — Z12.11 COLON CANCER SCREENING: ICD-10-CM

## 2022-04-11 LAB
ALBUMIN SERPL-MCNC: 3.6 G/DL (ref 3.4–5)
ALP SERPL-CCNC: 91 U/L (ref 40–150)
ALT SERPL W P-5'-P-CCNC: 44 U/L (ref 0–70)
ANION GAP SERPL CALCULATED.3IONS-SCNC: 8 MMOL/L (ref 3–14)
AST SERPL W P-5'-P-CCNC: 16 U/L (ref 0–45)
BASOPHILS # BLD AUTO: 0.1 10E3/UL (ref 0–0.2)
BASOPHILS NFR BLD AUTO: 1 %
BILIRUB SERPL-MCNC: 0.5 MG/DL (ref 0.2–1.3)
BUN SERPL-MCNC: 41 MG/DL (ref 7–30)
CALCIUM SERPL-MCNC: 10 MG/DL (ref 8.5–10.1)
CHLORIDE BLD-SCNC: 108 MMOL/L (ref 94–109)
CHOLEST SERPL-MCNC: 94 MG/DL
CO2 SERPL-SCNC: 21 MMOL/L (ref 20–32)
CREAT SERPL-MCNC: 1.72 MG/DL (ref 0.66–1.25)
EOSINOPHIL # BLD AUTO: 1.4 10E3/UL (ref 0–0.7)
EOSINOPHIL NFR BLD AUTO: 15 %
ERYTHROCYTE [DISTWIDTH] IN BLOOD BY AUTOMATED COUNT: 13 % (ref 10–15)
EST. AVERAGE GLUCOSE BLD GHB EST-MCNC: 148 MG/DL
FASTING STATUS PATIENT QL REPORTED: YES
GFR SERPL CREATININE-BSD FRML MDRD: 42 ML/MIN/1.73M2
GLUCOSE BLD-MCNC: 201 MG/DL (ref 70–99)
HBA1C MFR BLD: 6.8 % (ref 0–5.6)
HCT VFR BLD AUTO: 43.2 % (ref 40–53)
HDLC SERPL-MCNC: 45 MG/DL
HGB BLD-MCNC: 14.8 G/DL (ref 13.3–17.7)
LDLC SERPL CALC-MCNC: 33 MG/DL
LYMPHOCYTES # BLD AUTO: 1.5 10E3/UL (ref 0.8–5.3)
LYMPHOCYTES NFR BLD AUTO: 16 %
MCH RBC QN AUTO: 32 PG (ref 26.5–33)
MCHC RBC AUTO-ENTMCNC: 34.3 G/DL (ref 31.5–36.5)
MCV RBC AUTO: 93 FL (ref 78–100)
MONOCYTES # BLD AUTO: 0.5 10E3/UL (ref 0–1.3)
MONOCYTES NFR BLD AUTO: 5 %
NEUTROPHILS # BLD AUTO: 5.9 10E3/UL (ref 1.6–8.3)
NEUTROPHILS NFR BLD AUTO: 63 %
NONHDLC SERPL-MCNC: 49 MG/DL
PLATELET # BLD AUTO: 160 10E3/UL (ref 150–450)
POTASSIUM BLD-SCNC: 4.4 MMOL/L (ref 3.4–5.3)
PROT SERPL-MCNC: 7.1 G/DL (ref 6.8–8.8)
PSA SERPL-MCNC: 0.89 UG/L (ref 0–4)
RBC # BLD AUTO: 4.63 10E6/UL (ref 4.4–5.9)
SODIUM SERPL-SCNC: 137 MMOL/L (ref 133–144)
TRIGL SERPL-MCNC: 78 MG/DL
TSH SERPL DL<=0.005 MIU/L-ACNC: 1.92 MU/L (ref 0.4–4)
WBC # BLD AUTO: 9.3 10E3/UL (ref 4–11)

## 2022-04-11 PROCEDURE — 84443 ASSAY THYROID STIM HORMONE: CPT | Mod: ZL | Performed by: INTERNAL MEDICINE

## 2022-04-11 PROCEDURE — 82040 ASSAY OF SERUM ALBUMIN: CPT | Mod: ZL | Performed by: INTERNAL MEDICINE

## 2022-04-11 PROCEDURE — 80061 LIPID PANEL: CPT | Mod: ZL | Performed by: INTERNAL MEDICINE

## 2022-04-11 PROCEDURE — 36415 COLL VENOUS BLD VENIPUNCTURE: CPT | Mod: ZL | Performed by: INTERNAL MEDICINE

## 2022-04-11 PROCEDURE — G0103 PSA SCREENING: HCPCS | Mod: ZL | Performed by: INTERNAL MEDICINE

## 2022-04-11 PROCEDURE — 83036 HEMOGLOBIN GLYCOSYLATED A1C: CPT | Mod: ZL | Performed by: INTERNAL MEDICINE

## 2022-04-11 PROCEDURE — 85025 COMPLETE CBC W/AUTO DIFF WBC: CPT | Mod: ZL | Performed by: INTERNAL MEDICINE

## 2022-04-11 PROCEDURE — 99214 OFFICE O/P EST MOD 30 MIN: CPT | Performed by: INTERNAL MEDICINE

## 2022-04-11 PROCEDURE — G0463 HOSPITAL OUTPT CLINIC VISIT: HCPCS

## 2022-04-11 PROCEDURE — 80053 COMPREHEN METABOLIC PANEL: CPT | Mod: ZL | Performed by: INTERNAL MEDICINE

## 2022-04-11 RX ORDER — ATORVASTATIN CALCIUM 40 MG/1
40 TABLET, FILM COATED ORAL DAILY
Qty: 90 TABLET | Refills: 3 | Status: SHIPPED | OUTPATIENT
Start: 2022-04-11 | End: 2022-04-26

## 2022-04-11 RX ORDER — METFORMIN HCL 500 MG
1000 TABLET, EXTENDED RELEASE 24 HR ORAL 2 TIMES DAILY WITH MEALS
Qty: 360 TABLET | Refills: 3 | Status: SHIPPED | OUTPATIENT
Start: 2022-04-11 | End: 2022-05-23

## 2022-04-11 ASSESSMENT — ANXIETY QUESTIONNAIRES
7. FEELING AFRAID AS IF SOMETHING AWFUL MIGHT HAPPEN: NOT AT ALL
1. FEELING NERVOUS, ANXIOUS, OR ON EDGE: NOT AT ALL
3. WORRYING TOO MUCH ABOUT DIFFERENT THINGS: NOT AT ALL
GAD7 TOTAL SCORE: 0
2. NOT BEING ABLE TO STOP OR CONTROL WORRYING: NOT AT ALL
4. TROUBLE RELAXING: NOT AT ALL
5. BEING SO RESTLESS THAT IT IS HARD TO SIT STILL: NOT AT ALL
6. BECOMING EASILY ANNOYED OR IRRITABLE: NOT AT ALL

## 2022-04-11 ASSESSMENT — PAIN SCALES - GENERAL: PAINLEVEL: NO PAIN (0)

## 2022-04-11 ASSESSMENT — PATIENT HEALTH QUESTIONNAIRE - PHQ9: SUM OF ALL RESPONSES TO PHQ QUESTIONS 1-9: 0

## 2022-04-11 NOTE — PROGRESS NOTES
Assessment & Plan   Problem List Items Addressed This Visit        Endocrine    Type 2 diabetes mellitus     Relevant Medications    metFORMIN (GLUCOPHAGE-XR) 500 MG 24 hr tablet       Other    Slurred speech x 3 months, CT neg ED [facility] 4/25/18    Relevant Medications    atorvastatin (LIPITOR) 40 MG tablet    History of CVA 3/18    Relevant Medications    atorvastatin (LIPITOR) 40 MG tablet      Other Visit Diagnoses     Type 2 diabetes, HbA1c goal < 7% (H)    -  Primary    Relevant Medications    metFORMIN (GLUCOPHAGE-XR) 500 MG 24 hr tablet    Other Relevant Orders    Hemoglobin A1c    CBC with Platelets & Differential (Completed)    TSH with free T4 reflex    Albumin Random Urine Quantitative with Creat Ratio    UA with Microscopic reflex to Culture    MS FOOT EXAM NO CHARGE (Completed)    Hyperlipidemia LDL goal <100        Relevant Medications    atorvastatin (LIPITOR) 40 MG tablet    Other Relevant Orders    Comprehensive metabolic panel    Lipid Profile    Colon cancer screening        Relevant Orders    COLOGUARD([employer]) (Completed)    Screening for prostate cancer        Relevant Orders    PSA, screen    Mixed hyperlipidemia        Relevant Medications    atorvastatin (LIPITOR) 40 MG tablet             30 minutes spent on the date of the encounter doing chart review, review of test results, interpretation of tests, patient visit, documentation and discussion with family            No follow-ups on file.    Sreedhar Mendieta, DO  [facility]   Mendoza is a 71 year old who presents for the following health issues     HPI     Mendoza presents today for follow up of his diabetes, CKD stage 3 and a history of CVA.  He denies any complaints today.  Denies any chest pain.  He is due for fasting labs.  He refuses Low dose lung cancer screening today.  BP is at goal.      Diabetes Follow-up      How often are you checking your blood sugar? Not at all    What concerns  do you have today about your diabetes? None     Do you have any of these symptoms? (Select all that apply)  Numbness in feet    Have you had a diabetic eye exam in the last 12 months? No        BP Readings from Last 2 Encounters:   04/11/22 116/82   11/17/21 (!) 140/85     Hemoglobin A1C POCT (%)   Date Value   07/07/2021 7.5 (H)   02/02/2021 7.4 (H)     LDL Cholesterol Calculated (mg/dL)   Date Value   02/02/2021 31   08/05/2020 29         Hypertension Follow-up      Do you check your blood pressure regularly outside of the clinic? No     Are you following a low salt diet? Yes    Are your blood pressures ever more than 140 on the top number (systolic) OR more   than 90 on the bottom number (diastolic), for example 140/90? No      How many servings of fruits and vegetables do you eat daily?  0-1    On average, how many sweetened beverages do you drink each day (Examples: soda, juice, sweet tea, etc.  Do NOT count diet or artificially sweetened beverages)?   0    How many days per week do you exercise enough to make your heart beat faster? 3 or less    How many minutes a day do you exercise enough to make your heart beat faster? 9 or less    How many days per week do you miss taking your medication? 0        Review of Systems   Constitutional, HEENT, cardiovascular, pulmonary, gi and gu systems are negative, except as otherwise noted.      Objective    /82 (BP Location: Left arm, Patient Position: Chair, Cuff Size: Adult Large)   Pulse 61   Temp (!) 96.2  F (35.7  C) (Tympanic)   Wt (!) 151.6 kg (334 lb 3.2 oz)   SpO2 97%   BMI 42.91 kg/m    Body mass index is 42.91 kg/m .  Physical Exam   GENERAL: alert and no distress  RESP: lungs clear to auscultation - no rales, rhonchi or wheezes  CV: regular rate and rhythm, normal S1 S2, no S3 or S4, no murmurs  ABDOMEN: soft, nontender, no hepatosplenomegaly, no masses and bowel sounds normal  MS: +2 LE edema bilaterally   SKIN: Dry peeling skin on feet    NEURO:  Monofilament testing reveals sensation in feet intact.    PSYCH: mentation appears normal, affect normal/bright    Office Visit on 07/07/2021   Component Date Value Ref Range Status     Hemoglobin A1C POCT 07/07/2021 7.5 (A) 0 - 5.6 % Final    Comment: Normal <5.7% Prediabetes 5.7-6.4%  Diabetes 6.5% or higher - adopted from ADA   consensus guidelines.       Sodium 07/07/2021 136  133 - 144 mmol/L Final     Potassium 07/07/2021 4.4  3.4 - 5.3 mmol/L Final     Chloride 07/07/2021 105  94 - 109 mmol/L Final     Carbon Dioxide 07/07/2021 20  20 - 32 mmol/L Final     Anion Gap 07/07/2021 11  3 - 14 mmol/L Final     Glucose 07/07/2021 175 (A) 70 - 99 mg/dL Final    Non Fasting     Urea Nitrogen 07/07/2021 40 (A) 7 - 30 mg/dL Final     Creatinine 07/07/2021 1.73 (A) 0.66 - 1.25 mg/dL Final     GFR Estimate 07/07/2021 39 (A) >60 mL/min/[1.73_m2] Final    Comment: Non  GFR Calc  Starting 12/18/2018, serum creatinine based estimated GFR (eGFR) will be   calculated using the Chronic Kidney Disease Epidemiology Collaboration   (CKD-EPI) equation.       GFR Estimate If Black 07/07/2021 45 (A) >60 mL/min/[1.73_m2] Final    Comment:  GFR Calc  Starting 12/18/2018, serum creatinine based estimated GFR (eGFR) will be   calculated using the Chronic Kidney Disease Epidemiology Collaboration   (CKD-EPI) equation.       Calcium 07/07/2021 9.9  8.5 - 10.1 mg/dL Final     Bilirubin Total 07/07/2021 0.5  0.2 - 1.3 mg/dL Final     Albumin 07/07/2021 4.1  3.4 - 5.0 g/dL Final     Protein Total 07/07/2021 7.5  6.8 - 8.8 g/dL Final     Alkaline Phosphatase 07/07/2021 84  40 - 150 U/L Final     ALT 07/07/2021 49  0 - 70 U/L Final     AST 07/07/2021 18  0 - 45 U/L Final     Estimated Average Glucose 07/07/2021 169  mg/dL Final     Results for orders placed or performed in visit on 04/11/22   CBC with platelets and differential     Status: Abnormal   Result Value Ref Range    WBC Count 9.3 4.0 - 11.0 10e3/uL     RBC Count 4.63 4.40 - 5.90 10e6/uL    Hemoglobin 14.8 13.3 - 17.7 g/dL    Hematocrit 43.2 40.0 - 53.0 %    MCV 93 78 - 100 fL    MCH 32.0 26.5 - 33.0 pg    MCHC 34.3 31.5 - 36.5 g/dL    RDW 13.0 10.0 - 15.0 %    Platelet Count 160 150 - 450 10e3/uL    % Neutrophils 63 %    % Lymphocytes 16 %    % Monocytes 5 %    % Eosinophils 15 %    % Basophils 1 %    Absolute Neutrophils 5.9 1.6 - 8.3 10e3/uL    Absolute Lymphocytes 1.5 0.8 - 5.3 10e3/uL    Absolute Monocytes 0.5 0.0 - 1.3 10e3/uL    Absolute Eosinophils 1.4 (H) 0.0 - 0.7 10e3/uL    Absolute Basophils 0.1 0.0 - 0.2 10e3/uL   CBC with Platelets & Differential     Status: Abnormal    Narrative    The following orders were created for panel order CBC with Platelets & Differential.  Procedure                               Abnormality         Status                     ---------                               -----------         ------                     CBC with platelets and d...[[id number]]  Abnormal            Final result                 Please view results for these tests on the individual orders.     Results for orders placed or performed in visit on 04/11/22 (from the past 24 hour(s))   CBC with Platelets & Differential    Narrative    The following orders were created for panel order CBC with Platelets & Differential.  Procedure                               Abnormality         Status                     ---------                               -----------         ------                     CBC with platelets and d...[[id number]]  Abnormal            Final result                 Please view results for these tests on the individual orders.   CBC with platelets and differential   Result Value Ref Range    WBC Count 9.3 4.0 - 11.0 10e3/uL    RBC Count 4.63 4.40 - 5.90 10e6/uL    Hemoglobin 14.8 13.3 - 17.7 g/dL    Hematocrit 43.2 40.0 - 53.0 %    MCV 93 78 - 100 fL    MCH 32.0 26.5 - 33.0 pg    MCHC 34.3 31.5 - 36.5 g/dL    RDW 13.0 10.0 - 15.0 %    Platelet Count  160 150 - 450 10e3/uL    % Neutrophils 63 %    % Lymphocytes 16 %    % Monocytes 5 %    % Eosinophils 15 %    % Basophils 1 %    Absolute Neutrophils 5.9 1.6 - 8.3 10e3/uL    Absolute Lymphocytes 1.5 0.8 - 5.3 10e3/uL    Absolute Monocytes 0.5 0.0 - 1.3 10e3/uL    Absolute Eosinophils 1.4 (H) 0.0 - 0.7 10e3/uL    Absolute Basophils 0.1 0.0 - 0.2 10e3/uL

## 2022-04-11 NOTE — NURSING NOTE
"Chief Complaint   Patient presents with     Annual Visit       Initial /82 (BP Location: Left arm, Patient Position: Chair, Cuff Size: Adult Large)   Pulse 61   Temp (!) 96.2  F (35.7  C) (Tympanic)   Wt (!) 151.6 kg (334 lb 3.2 oz)   SpO2 97%   BMI 42.91 kg/m   Estimated body mass index is 42.91 kg/m  as calculated from the following:    Height as of 2/2/21: 1.88 m (6' 2\").    Weight as of this encounter: 151.6 kg (334 lb 3.2 oz).  Medication Reconciliation: complete  Vanessa Branch    "

## 2022-04-11 NOTE — PROGRESS NOTES
{PROVIDER CHARTING PREFERENCE:695253}    Eloy Donladson is a 71 year old who presents for the following health issues     HPI     Diabetes Follow-up      How often are you checking your blood sugar? Not at all    What concerns do you have today about your diabetes? None     Do you have any of these symptoms? (Select all that apply)     Have you had a diabetic eye exam in the last 12 months? { :976703}        {Reference  Diabetes Management Resources :037224}    {Reference  Diabetes Log - 7 days :423933}    Hyperlipidemia Follow-Up      Are you regularly taking any medication or supplement to lower your cholesterol?   { :625692}    Are you having muscle aches or other side effects that you think could be caused by your cholesterol lowering medication?  { :034775}    Hypertension Follow-up      Do you check your blood pressure regularly outside of the clinic? { :400644}     Are you following a low salt diet? { :252380}    Are your blood pressures ever more than 140 on the top number (systolic) OR more   than 90 on the bottom number (diastolic), for example 140/90? { :876291}    BP Readings from Last 2 Encounters:   11/17/21 (!) 140/85   07/07/21 122/88     Hemoglobin A1C POCT (%)   Date Value   07/07/2021 7.5 (H)   02/02/2021 7.4 (H)     LDL Cholesterol Calculated (mg/dL)   Date Value   02/02/2021 31   08/05/2020 29           {additonal problems for provider to add (Optional):586594}    Review of Systems   {ROS COMP (Optional):137532}      Objective    There were no vitals taken for this visit.  There is no height or weight on file to calculate BMI.  Physical Exam   {Exam List (Optional):513671}    {Diagnostic Test Results (Optional):255739}    {AMBULATORY ATTESTATION (Optional):159342}

## 2022-04-12 ASSESSMENT — ANXIETY QUESTIONNAIRES: GAD7 TOTAL SCORE: 0

## 2022-04-19 LAB — COLOGUARD-ABSTRACT: NEGATIVE

## 2022-04-22 DIAGNOSIS — R47.81 SLURRED SPEECH: ICD-10-CM

## 2022-04-22 DIAGNOSIS — E11.8 TYPE 2 DIABETES MELLITUS WITH COMPLICATION, WITHOUT LONG-TERM CURRENT USE OF INSULIN (H): ICD-10-CM

## 2022-04-22 DIAGNOSIS — E78.2 MIXED HYPERLIPIDEMIA: ICD-10-CM

## 2022-04-22 DIAGNOSIS — Z86.73 HISTORY OF CVA (CEREBROVASCULAR ACCIDENT): ICD-10-CM

## 2022-04-26 RX ORDER — METFORMIN HCL 500 MG
TABLET, EXTENDED RELEASE 24 HR ORAL
Qty: 360 TABLET | Refills: 3 | OUTPATIENT
Start: 2022-04-26

## 2022-04-26 RX ORDER — ATORVASTATIN CALCIUM 40 MG/1
TABLET, FILM COATED ORAL
Qty: 90 TABLET | Refills: 3 | Status: SHIPPED | OUTPATIENT
Start: 2022-04-26 | End: 2022-05-23

## 2022-05-06 DIAGNOSIS — I10 BENIGN ESSENTIAL HYPERTENSION: ICD-10-CM

## 2022-05-06 RX ORDER — AMLODIPINE BESYLATE 10 MG/1
TABLET ORAL
Qty: 90 TABLET | Refills: 1 | Status: SHIPPED | OUTPATIENT
Start: 2022-05-06 | End: 2022-05-23

## 2022-05-06 NOTE — TELEPHONE ENCOUNTER
amLODIPine (NORVASC) 10 MG tablet      Last Written Prescription Date:  1-5-22  Last Fill Quantity: 90,   # refills: 1  Last Office Visit: 4-11-22  Future Office visit:    Next 5 appointments (look out 90 days)    May 23, 2022 11:00 AM  (Arrive by 10:45 AM)  [facility] DO  [facility] [address] [facility] [address]  [phone]   Jul 06, 2022 10:30 AM  (Arrive by 10:15 AM)  Return Visit with Leonie Og DPM  [facility] ([facility] ) [address] [place] [id number] [phone]           Routing refill request to provider for review/approval because:   Normal serum creatinine on file in past 12 months    Creatinine   Date Value Ref Range Status   04/11/2022 1.72 (H) 0.66 - 1.25 mg/dL Final   07/07/2021 1.73 (H) 0.66 - 1.25 mg/dL Final

## 2022-05-11 DIAGNOSIS — I10 BENIGN ESSENTIAL HYPERTENSION: ICD-10-CM

## 2022-05-11 DIAGNOSIS — I51.7 RIGHT VENTRICULAR ENLARGEMENT: ICD-10-CM

## 2022-05-11 DIAGNOSIS — I71.21 ASCENDING AORTIC ANEURYSM (H): ICD-10-CM

## 2022-05-11 DIAGNOSIS — E66.01 MORBID OBESITY (H): Primary | ICD-10-CM

## 2022-05-11 DIAGNOSIS — I42.2 HYPERTROPHIC CARDIOMYOPATHY (H): ICD-10-CM

## 2022-05-11 DIAGNOSIS — I51.89 DIASTOLIC DYSFUNCTION: ICD-10-CM

## 2022-05-14 ENCOUNTER — HEALTH MAINTENANCE LETTER (OUTPATIENT)
Age: 72
End: 2022-05-14

## 2022-05-20 ENCOUNTER — HOSPITAL ENCOUNTER (OUTPATIENT)
Dept: CARDIOLOGY | Facility: HOSPITAL | Age: 72
Discharge: HOME | End: 2022-05-20
Attending: INTERNAL MEDICINE | Admitting: INTERNAL MEDICINE
Payer: COMMERCIAL

## 2022-05-20 DIAGNOSIS — I71.21 ASCENDING AORTIC ANEURYSM (H): ICD-10-CM

## 2022-05-20 DIAGNOSIS — I51.89 DIASTOLIC DYSFUNCTION: ICD-10-CM

## 2022-05-20 DIAGNOSIS — I10 BENIGN ESSENTIAL HYPERTENSION: ICD-10-CM

## 2022-05-20 DIAGNOSIS — I51.7 RIGHT VENTRICULAR ENLARGEMENT: ICD-10-CM

## 2022-05-20 DIAGNOSIS — I42.2 HYPERTROPHIC CARDIOMYOPATHY (H): ICD-10-CM

## 2022-05-20 LAB — LVEF ECHO: NORMAL

## 2022-05-20 PROCEDURE — 93306 TTE W/DOPPLER COMPLETE: CPT

## 2022-05-23 ENCOUNTER — PATIENT OUTREACH (OUTPATIENT)
Dept: CARE COORDINATION | Facility: OTHER | Age: 72
End: 2022-05-23
Payer: COMMERCIAL

## 2022-05-23 ENCOUNTER — OFFICE VISIT (OUTPATIENT)
Dept: CARDIOLOGY | Facility: OTHER | Age: 72
End: 2022-05-23
Attending: INTERNAL MEDICINE
Payer: COMMERCIAL

## 2022-05-23 VITALS
HEART RATE: 95 BPM | OXYGEN SATURATION: 98 % | WEIGHT: 315 LBS | BODY MASS INDEX: 40.43 KG/M2 | TEMPERATURE: 97.6 F | HEIGHT: 74 IN | DIASTOLIC BLOOD PRESSURE: 68 MMHG | SYSTOLIC BLOOD PRESSURE: 100 MMHG

## 2022-05-23 DIAGNOSIS — I42.2 HYPERTROPHIC CARDIOMYOPATHY (H): Primary | ICD-10-CM

## 2022-05-23 DIAGNOSIS — I42.2 HYPERTROPHIC CARDIOMYOPATHY (H): ICD-10-CM

## 2022-05-23 DIAGNOSIS — I51.89 DIASTOLIC DYSFUNCTION: ICD-10-CM

## 2022-05-23 DIAGNOSIS — N40.0 BENIGN PROSTATIC HYPERPLASIA, UNSPECIFIED WHETHER LOWER URINARY TRACT SYMPTOMS PRESENT: ICD-10-CM

## 2022-05-23 DIAGNOSIS — R06.09 DOE (DYSPNEA ON EXERTION): ICD-10-CM

## 2022-05-23 DIAGNOSIS — E66.01 MORBID OBESITY (H): ICD-10-CM

## 2022-05-23 DIAGNOSIS — Z86.73 HISTORY OF CVA (CEREBROVASCULAR ACCIDENT): ICD-10-CM

## 2022-05-23 DIAGNOSIS — Z91.89 SEDENTARY LIFESTYLE: ICD-10-CM

## 2022-05-23 DIAGNOSIS — Z87.891 HISTORY OF TOBACCO ABUSE: ICD-10-CM

## 2022-05-23 DIAGNOSIS — I10 BENIGN ESSENTIAL HYPERTENSION: ICD-10-CM

## 2022-05-23 DIAGNOSIS — E11.8 TYPE 2 DIABETES MELLITUS WITH COMPLICATION, WITHOUT LONG-TERM CURRENT USE OF INSULIN (H): ICD-10-CM

## 2022-05-23 DIAGNOSIS — I51.7 RIGHT VENTRICULAR ENLARGEMENT: ICD-10-CM

## 2022-05-23 DIAGNOSIS — I71.21 ASCENDING AORTIC ANEURYSM (H): ICD-10-CM

## 2022-05-23 DIAGNOSIS — N18.31 STAGE 3A CHRONIC KIDNEY DISEASE (H): ICD-10-CM

## 2022-05-23 DIAGNOSIS — E78.2 MIXED HYPERLIPIDEMIA: ICD-10-CM

## 2022-05-23 DIAGNOSIS — R47.81 SLURRED SPEECH: ICD-10-CM

## 2022-05-23 DIAGNOSIS — R06.09 DOE (DYSPNEA ON EXERTION): Primary | ICD-10-CM

## 2022-05-23 PROCEDURE — 99214 OFFICE O/P EST MOD 30 MIN: CPT | Performed by: INTERNAL MEDICINE

## 2022-05-23 PROCEDURE — G0463 HOSPITAL OUTPT CLINIC VISIT: HCPCS

## 2022-05-23 RX ORDER — AMLODIPINE BESYLATE 10 MG/1
10 TABLET ORAL DAILY
Qty: 90 TABLET | Refills: 3 | Status: SHIPPED | OUTPATIENT
Start: 2022-05-23 | End: 2023-04-17

## 2022-05-23 RX ORDER — LISINOPRIL 40 MG/1
40 TABLET ORAL DAILY
Qty: 90 TABLET | Refills: 3 | Status: SHIPPED | OUTPATIENT
Start: 2022-05-23 | End: 2023-06-07

## 2022-05-23 RX ORDER — ATORVASTATIN CALCIUM 40 MG/1
40 TABLET, FILM COATED ORAL DAILY
Qty: 90 TABLET | Refills: 3 | Status: SHIPPED | OUTPATIENT
Start: 2022-05-23 | End: 2023-04-17

## 2022-05-23 RX ORDER — METOPROLOL TARTRATE 50 MG
TABLET ORAL
Qty: 180 TABLET | Refills: 3 | Status: SHIPPED | OUTPATIENT
Start: 2022-05-23 | End: 2023-06-07

## 2022-05-23 RX ORDER — ASPIRIN 81 MG/1
81 TABLET, CHEWABLE ORAL DAILY
Qty: 90 TABLET | Refills: 3 | Status: SHIPPED | OUTPATIENT
Start: 2022-05-23 | End: 2023-03-24

## 2022-05-23 RX ORDER — TAMSULOSIN HYDROCHLORIDE 0.4 MG/1
CAPSULE ORAL
Qty: 180 CAPSULE | Refills: 3 | Status: SHIPPED | OUTPATIENT
Start: 2022-05-23 | End: 2023-03-24

## 2022-05-23 RX ORDER — METFORMIN HCL 500 MG
1000 TABLET, EXTENDED RELEASE 24 HR ORAL 2 TIMES DAILY WITH MEALS
Qty: 360 TABLET | Refills: 3 | Status: SHIPPED | OUTPATIENT
Start: 2022-05-23 | End: 2023-03-07

## 2022-05-23 ASSESSMENT — PAIN SCALES - GENERAL: PAINLEVEL: NO PAIN (0)

## 2022-05-23 NOTE — PROGRESS NOTES
[facility] HEART CARE   CARDIOLOGY PROGRESS NOTE     Chief Complaint   Patient presents with     Consult     1 year follow up- echo done last week          Diagnosis:  1. TAAA. 4.4 CM on 5/20/22. 4.5 CM on 5/3/21. 4.45 cm on 1/7/19. 4.39 cm on 1/13/20.  2.  HCM at 2.2 cm on 1/7/19. 2.0 cm on 1/13/20. 1.3 CM on 5/20/22  3.  H/O tobacco abuse. Cigars quitting in April, 2018.  He smoked approximate 7 days cigars on a daily basis.  4.  Diastolic dysfunction grade 1 on 1/7/19.  5.  Hypertension-controled.  6.  DM-2.  7.  CKD-3.  8.  H/O CVA in March, 2018 with what appears to be unsteady gait.  9.  Sedentary lifestyle.  10.  Morbid obesity      Assessment/Plan:    1.  TAAA: Stable at 4.4 cm on 5/2722.  Previously 4.5 cm on 5/3/2021 and 4.45 cm on 1/7/2019.  Most recent and previous echo discussed.  We will plan for an echocardiogram in 2 years since his ascending aortic aneurysm has been stable.  2.  HTN: Noted to be 1.3 cm in diameter 5/20/2022 but previously 2.0 cm on 1/13/2020 without a gradient.  Patient not having symptoms of heart failure or angina.  We will plan for repeat echocardiogram in 2 years.  3.  Diastolic dysfunction: Asymptomatic.  Denies shortness of breath or peripheral edema today.  Repeat echocardiogram in 2 years.  4.  Hypertension: Elevated today at 142/76 but has been relatively controlled in the past.  We will recheck his pressures.  No changes to antihypertensives.  Continue on lisinopril 40 mg daily and metoprolol 50 mg twice a day.  5.  DM-2: We will refill metoprolol today.  Will leave management to primary.  6.  Patient requesting all medications be refilled.  7.  Follow-up in 2 years after completion of echocardiogram related to ascending aortic aneurysm and HTN.      Interval history:  He is doing well.  He voices no complaints.  He is here in follow-up to his ascending aortic aneurysm.  His ascending aortic aneurysm has been stable in size.  We reviewed his echocardiogram from 5/20/2022.  He  also has a history of HCM.  Most recent diameter 1 1.3 cm in diameter in 5/20/2022.  Admits greatest diameter, was 2.2 cm on 1/7/2019.  He has not had a gradient in the past.  Has not had any exertional chest pain/angina or significant heart failure.  We will follow.  Patient is needing all of his medications refilled.  These were sent to his pharmacy.  Blood pressure was elevated today but controlled in the past.  No changes to medications.  We will recheck his blood pressure.  We will see him in 2 years after completion of echocardiogram or sooner with issues.      HPI:    He is known to have HCM with a septal thickness of 2.2 cm on 1/7/19, an ascending aortic aneurysm at 44.5 mm, and diastolic dysfunction grade 1.      He previously had an echo on 6/20/18 with a septal thickness of 2.0 cm and an TAAA of 4.4 cm.       On 1/7/19, his septum was 2.2 cm, had grade 1 diastolic dysfunction, and ascending aortic aneurysm at 44.5 cm.  He has a history of a CVA with ongoing unsteadiness of gait but no dizziness in March, 2018. He is to be a heavy smoker smoking up to 7-8  cigars on a daily basis. He reports he quit smoking in April, 2018. He has hypertension with a blood pressure of 171/91 today.  At home, his blood pressure is running in the 120's/80's regularly.  He is currently on lisinopril 40 mg daily and metoprolol 50 mg twice a day.  He has not had any lower extremity edema or chest pain.  With his history of diastolic dysfunction, it has been suggested he watch his salt intake, fluid intake, and weigh himself on a daily basis.      Relevant testing:  ECHO on 5/20/22:  Global and regional left ventricular function is normal with an EF of 60-65%.  Global right ventricular function is normal.  Aortic root and ascending aorta size both normal for the patient when indexed  to BSA.  Sinuses of Valsalva 4.4 cm.  Ascending aorta 4.4 cm.  This study was compared with the study from 5/2021 .  No significant changes  noted.    ECHO on 5/3/21:  Global and regional left ventricular function is normal with an EF of 60-65%.  The right ventricle is normal size.  Global right ventricular function is normal.  Mild to moderate left atrial enlargement is present.  Trace mitral insufficiency is present.  Ao valve leaflets not well visualized  Trace tricuspid insufficiency is present.  Ascending aorta 4.3 cm.  Aortic root 4.5 cm        ICD-10-CM    1. Benign essential hypertension  I10    2. Type 2 diabetes mellitus with complication, without long-term current use of insulin (H)  E11.8    3. Ascending aortic aneurysm at 4.45 cm on echo from 1/7/19  I71.2    4. Diastolic dysfunction grade 1 on 1/7/19  I51.89    5. History of CVA 3/18  Z86.73    6. Slurred speech x 3 months, [facility] 4/25/18  R47.81    7. Mixed hyperlipidemia  E78.2    8. Benign prostatic hyperplasia, unspecified whether lower urinary tract symptoms present  N40.0        Past Medical History:   Diagnosis Date     Altered gait 4/27/2018     Altered mental status - since approx 1/18 - delayed response, change in mentation 4/27/2018     Benign essential hypertension 4/27/2018     Chronic bilateral low back pain with sciatica 4/27/2018     Chronic fatigue 4/27/2018     Obesity 4/27/2018     Slurred speech x 3 months, CT neg [facility] 4/25/18 4/27/2018     Stroke (H)      Type 2 diabetes mellitus with complication, without long-term current use of insulin (H) 4/27/2018     Vision changes x 3 months as of 4/18 4/27/2018       No past surgical history on file.    No Known Allergies    Current Outpatient Medications   Medication Sig Dispense Refill     amLODIPine (NORVASC) 10 MG tablet TAKE 1 TABLET(10 MG) BY MOUTH DAILY 90 tablet 1     ASPIRIN PO Take 81 mg by mouth daily       atorvastatin (LIPITOR) 40 MG tablet TAKE 1 TABLET(40 MG) BY MOUTH DAILY 90 tablet 3     Cholecalciferol (VITAMIN D-3) 1000 units CAPS Take 1,000 Units by mouth 2 times daily        CINNAMON PO  Take 1,000 mg by mouth daily        Garlic 1000 MG CAPS Take by mouth daily        lisinopril (ZESTRIL) 40 MG tablet TAKE 1 TABLET(40 MG) BY MOUTH DAILY 90 tablet 3     metFORMIN (GLUCOPHAGE-XR) 500 MG 24 hr tablet Take 2 tablets (1,000 mg) by mouth in the morning and 2 tablets (1,000 mg) in the evening. Take with meals. 360 tablet 3     metoprolol tartrate (LOPRESSOR) 50 MG tablet TAKE 1 TABLET(50 MG) BY MOUTH TWICE DAILY 180 tablet 3     order for DME Equipment being ordered: Stationary Bike 1 Device 0     tamsulosin (FLOMAX) 0.4 MG capsule TAKE 2 CAPSULES(0.8 MG) BY MOUTH DAILY 180 capsule 3     Turmeric 500 MG CAPS Take by mouth daily protadim and NRF2       TURMERIC CURCUMIN PO Take 1,500 mg by mouth daily          Social History     Socioeconomic History     Marital status: Single     Spouse name: Not on file     Number of children: Not on file     Years of education: Not on file     Highest education level: Not on file   Occupational History     Not on file   Tobacco Use     Smoking status: Former Smoker     Packs/day: 0.50     Types: Cigars     Start date: 1/1/2003     Quit date: 6/14/2018     Years since quitting: 3.9     Smokeless tobacco: Former User     Quit date: 2/1/2021   Substance and Sexual Activity     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      Drug use: No     Sexual activity: Never   Other Topics Concern     Parent/sibling w/ CABG, MI or angioplasty before 65F 55M? Yes   Social History Narrative     Not on file     Social Determinants of Health     Financial Resource Strain: Not on file   Food Insecurity: Not on file   Transportation Needs: Not on file   Physical Activity: Not on file   Stress: Not on file   Social Connections: Not on file   Intimate Partner Violence: Not on file   Housing Stability: Not on file       LAB RESULTS:   No visits with results within 2 Month(s) from this visit.   Latest known visit with results is:   Office Visit on 02/02/2021   Component Date Value Ref Range  Status     Cholesterol 02/02/2021 104  <200 mg/dL Final     Triglycerides 02/02/2021 150* <150 mg/dL Final     HDL Cholesterol 02/02/2021 43  >39 mg/dL Final     LDL Cholesterol Calculated 02/02/2021 31  <100 mg/dL Final     Non HDL Cholesterol 02/02/2021 61  <130 mg/dL Final     Sodium 02/02/2021 136  133 - 144 mmol/L Final     Potassium 02/02/2021 4.9  3.4 - 5.3 mmol/L Final     Chloride 02/02/2021 107  94 - 109 mmol/L Final     Carbon Dioxide 02/02/2021 18* 20 - 32 mmol/L Final     Anion Gap 02/02/2021 11  3 - 14 mmol/L Final     Glucose 02/02/2021 184* 70 - 99 mg/dL Final     Urea Nitrogen 02/02/2021 35* 7 - 30 mg/dL Final     Creatinine 02/02/2021 1.76* 0.66 - 1.25 mg/dL Final     GFR Estimate 02/02/2021 38* >60 mL/min/[1.73_m2] Final     GFR Estimate If Black 02/02/2021 45* >60 mL/min/[1.73_m2] Final     Calcium 02/02/2021 10.1  8.5 - 10.1 mg/dL Final     Bilirubin Total 02/02/2021 0.6  0.2 - 1.3 mg/dL Final     Albumin 02/02/2021 4.0  3.4 - 5.0 g/dL Final     Protein Total 02/02/2021 7.4  6.8 - 8.8 g/dL Final     Alkaline Phosphatase 02/02/2021 85  40 - 150 U/L Final     ALT 02/02/2021 42  0 - 70 U/L Final     AST 02/02/2021 13  0 - 45 U/L Final     WBC 02/02/2021 8.9  4.0 - 11.0 10e9/L Final     RBC Count 02/02/2021 4.96  4.4 - 5.9 10e12/L Final     Hemoglobin 02/02/2021 15.8  13.3 - 17.7 g/dL Final     Hematocrit 02/02/2021 45.2  40.0 - 53.0 % Final     MCV 02/02/2021 91  78 - 100 fl Final     MCH 02/02/2021 31.9  26.5 - 33.0 pg Final     MCHC 02/02/2021 35.0  31.5 - 36.5 g/dL Final     RDW 02/02/2021 12.5  10.0 - 15.0 % Final     Platelet Count 02/02/2021 173  150 - 450 10e9/L Final     % Neutrophils 02/02/2021 65.2  % Final     % Lymphocytes 02/02/2021 26.0  % Final     % Monocytes 02/02/2021 6.8  % Final     % Eosinophils 02/02/2021 1.5  % Final     % Basophils 02/02/2021 0.5  % Final     Absolute Neutrophil 02/02/2021 5.8  1.6 - 8.3 10e9/L Final     Absolute Lymphocytes 02/02/2021 2.3  0.8 - 5.3 10e9/L  "Final     Absolute Monocytes 02/02/2021 0.6  0.0 - 1.3 10e9/L Final     Absolute Eosinophils 02/02/2021 0.1  0.0 - 0.7 10e9/L Final     Absolute Basophils 02/02/2021 0.0  0.0 - 0.2 10e9/L Final     Diff Method 02/02/2021 Automated Method   Final     Hemoglobin A1C 02/02/2021 7.4* 0 - 5.6 % Final     Estimated Average Glucose 02/02/2021 166  mg/dL Final        Review of systems: Negative except that which was noted in the HPI.    Physical examination:  BP (!) 142/76 (BP Location: Right arm, Patient Position: Chair, Cuff Size: Adult Large)   Pulse 95   Temp 97.6  F (36.4  C) (Tympanic)   Ht 1.88 m (6' 2\")   Wt 147.4 kg (325 lb)   SpO2 98%   BMI 41.73 kg/m      GENERAL APPEARANCE: healthy, alert and no distress  HEENT: no icterus, no xanthelasmas, normal pupil size and reaction, no cyanosis.  NECK: no adenopathy, no asymmetry, masses.  CHEST: lungs clear to auscultation - no rales, rhonchi or wheezes, no use of accessory muscles, no retractions, respirations are unlabored, normal respiratory rate  CARDIOVASCULAR: regular rhythm, normal S1 with physiologic split S2, no S3 or S4 and no murmur, click or rub  EXTREMITIES: no clubbing, cyanosis or edema  NEURO: alert and oriented normal speech, and affect  VASC: No vascular bruits heard.  SKIN: no ecchymoses, no rashes    Total time spent on day of visit, including review of tests, obtaining/reviewing separately obtained history, ordering medications/tests/procedures, communicating with PCP/consultants, and documenting in electronic medical record: 22 minutes.           Thank you for allowing me to participate in the care of your patient. Please do not hesitate to contact me if you have any questions.     Chas Armendariz, DO          "

## 2022-05-23 NOTE — PATIENT INSTRUCTIONS
Thank you for allowing Dr. Armendariz and our  team to participate in your care. Please call our office at [phone] with scheduling questions or if you need to cancel or change your appointment. With any other questions or concerns you may call [facility] cardiology nurse at [phone].       If you experience chest pain, chest pressure, chest tightness, shortness of breath, fainting, lightheadedness, nausea, vomiting, or other concerning symptoms, please report to the Emergency Department or call 911. These symptoms may be emergent, and best treated in the Emergency Department.    Follow up in 2 years, Echocardiogram should be complete.     Refill medication today.       You will have an echocardiogram in 2 years performed.  This is an ultrasound of the heart, that evaluates heart function.  The hospital scheduling department will call to schedule you for this test.

## 2022-05-23 NOTE — NURSING NOTE
"Chief Complaint   Patient presents with     Consult     1 year follow up- echo done last week       Initial BP (!) 142/76 (BP Location: Right arm, Patient Position: Chair, Cuff Size: Adult Large)   Pulse 95   Temp 97.6  F (36.4  C) (Tympanic)   Ht 1.88 m (6' 2\")   Wt 147.4 kg (325 lb)   SpO2 98%   BMI 41.73 kg/m   Estimated body mass index is 41.73 kg/m  as calculated from the following:    Height as of this encounter: 1.88 m (6' 2\").    Weight as of this encounter: 147.4 kg (325 lb).  Medication Reconciliation: complete  ANASTACIO ELMORE LPN    "

## 2022-06-14 ENCOUNTER — VIRTUAL VISIT (OUTPATIENT)
Dept: NEUROLOGY | Facility: CLINIC | Age: 72
End: 2022-06-14
Payer: COMMERCIAL

## 2022-06-14 DIAGNOSIS — I63.111 CEREBROVASCULAR ACCIDENT (CVA) DUE TO EMBOLISM OF RIGHT VERTEBRAL ARTERY (H): Primary | ICD-10-CM

## 2022-06-14 PROCEDURE — 99212 OFFICE O/P EST SF 10 MIN: CPT | Mod: 95 | Performed by: PSYCHIATRY & NEUROLOGY

## 2022-06-14 NOTE — LETTER
6/14/2022       RE: Deon Culver  [address]  [place] [facility] [address]     Dear Colleague,    Thank you for referring your patient, Deon Culver, to the [facility] NEUROLOGY CLINIC [facility]. Please see a copy of my visit note below.    [facility] Neurology Follow Up Visit    Deon Culver MRN# [id number]   Age: 72 year old YOB: 1950     Brief history of symptoms: The patient was initially seen in neurologic consultation on 6/25/2021 for evaluation of CVA follow up. Please see the comprehensive neurologic consultation notes from those dates in the Epic records for details.     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  He was to attend PT, obtain MRI brain repeat, and consider further nutritional options for his deconditioned state by his PCP.    Interval history:   - MRI brain 7/14/2021 was done and showed stable appearance compared to 2019 imaging.  There was white matter changes of the brainstem, and b/l cerebral atria.    Today, the patient doesn't report new focal neurological deficits.  He is using a walker to ambulate, and has difficulty getting good prolonged exercise.  He has lost some weight over the last year with changes in his diet, helped by his friend in [place].  He is taking his statin and blood pressure medications.  He is not snoring or tired in the AM.  He feels he just doesn't have leg strength to walk prolonged distances.     Physical Exam:   General: Seated comfortably in no acute distress. In a darkened room, difficult to see whole body on camera.  Neurologic:     Mental Status: Fully alert, attentive and oriented. Speech clear and fluent, no paraphasic errors.     Cranial Nerves: EOMI with normal smooth pursuit. Facial movements symmetric.      Motor: No tremors or other abnormal movements observed.         Assessment  and Plan:   Assessment:  CVA (left medullary), minimal ataxia still present    The patient is doing well with meeting his stroke prophylaxis goals, and can continue to monitor these through his PCP.  He may need some assistance with an exercise regiment given his imbalance and fatigability, and I will refer him to PT to aide in this regard.  There is no pain or ache with his proximal muscles reported, so a statin induced myopathy is unlikely.  I see no reason to repeat imaging unless a new focal deficits occurs.      Plan:  - PT for deconditioning  - BP under 130/80  - LDL under 70  - A1c under 6.0  - ASA 81 mg QD    Follow up in Neurology clinic as needed should new concerns arise.      JIA Fernandez D.O.   of Neurology      Total time today (20 min) in this patient encounter was spent on pre-charting, counseling and/or coordination of care.

## 2022-06-14 NOTE — PROGRESS NOTES
Mendoza is a 72 year old who is being evaluated via a billable video visit.      How would you like to obtain your AVS? MyChart  If the video visit is dropped, the invitation should be resent by: [phone]      Video Start Time: 1000  Video-Visit Details    Type of service:  Video Visit    Video End Time:10:16 AM    Originating Location (pt. Location): Home    Distant Location (provider location):  [facility] NEUROLOGY [facility]     Platform used for Video Visit: [employer]

## 2022-06-14 NOTE — PROGRESS NOTES
[facility] Neurology Follow Up Visit    Deon Culver MRN# [id number]   Age: 72 year old YOB: 1950     Brief history of symptoms: The patient was initially seen in neurologic consultation on 6/25/2021 for evaluation of CVA follow up. Please see the comprehensive neurologic consultation notes from those dates in the Epic records for details.     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  He was to attend PT, obtain MRI brain repeat, and consider further nutritional options for his deconditioned state by his PCP.    Interval history:   - MRI brain 7/14/2021 was done and showed stable appearance compared to 2019 imaging.  There was white matter changes of the brainstem, and b/l cerebral atria.    Today, the patient doesn't report new focal neurological deficits.  He is using a walker to ambulate, and has difficulty getting good prolonged exercise.  He has lost some weight over the last year with changes in his diet, helped by his friend in [place].  He is taking his statin and blood pressure medications.  He is not snoring or tired in the AM.  He feels he just doesn't have leg strength to walk prolonged distances.     Physical Exam:   General: Seated comfortably in no acute distress. In a darkened room, difficult to see whole body on camera.  Neurologic:     Mental Status: Fully alert, attentive and oriented. Speech clear and fluent, no paraphasic errors.     Cranial Nerves: EOMI with normal smooth pursuit. Facial movements symmetric.      Motor: No tremors or other abnormal movements observed.         Assessment and Plan:   Assessment:  CVA (left medullary), minimal ataxia still present    The patient is doing well with meeting his stroke prophylaxis goals, and can continue to monitor these through his PCP.  He may need some assistance with an exercise regiment given his imbalance and fatigability, and I will refer him  to PT to aide in this regard.  There is no pain or ache with his proximal muscles reported, so a statin induced myopathy is unlikely.  I see no reason to repeat imaging unless a new focal deficits occurs.      Plan:  - PT for deconditioning  - BP under 130/80  - LDL under 70  - A1c under 6.0  - ASA 81 mg QD    Follow up in Neurology clinic as needed should new concerns arise.    JIA Fernandez D.O.   of Neurology    Total time today (20 min) in this patient encounter was spent on pre-charting, counseling and/or coordination of care.

## 2022-07-06 ENCOUNTER — OFFICE VISIT (OUTPATIENT)
Dept: PODIATRY | Facility: OTHER | Age: 72
End: 2022-07-06
Attending: PODIATRIST
Payer: COMMERCIAL

## 2022-07-06 VITALS
HEART RATE: 97 BPM | TEMPERATURE: 97 F | SYSTOLIC BLOOD PRESSURE: 113 MMHG | DIASTOLIC BLOOD PRESSURE: 85 MMHG | OXYGEN SATURATION: 98 %

## 2022-07-06 DIAGNOSIS — E11.9 DIABETES MELLITUS TYPE 2, NONINSULIN DEPENDENT (H): ICD-10-CM

## 2022-07-06 DIAGNOSIS — M21.41 PES PLANUS OF BOTH FEET: ICD-10-CM

## 2022-07-06 DIAGNOSIS — L85.3 XEROSIS OF SKIN: ICD-10-CM

## 2022-07-06 DIAGNOSIS — E11.42 DIABETIC POLYNEUROPATHY ASSOCIATED WITH TYPE 2 DIABETES MELLITUS (H): ICD-10-CM

## 2022-07-06 DIAGNOSIS — N18.32 STAGE 3B CHRONIC KIDNEY DISEASE (H): ICD-10-CM

## 2022-07-06 DIAGNOSIS — M21.42 PES PLANUS OF BOTH FEET: ICD-10-CM

## 2022-07-06 DIAGNOSIS — L60.3 ONYCHODYSTROPHY: Primary | ICD-10-CM

## 2022-07-06 DIAGNOSIS — Z86.73 HISTORY OF CVA (CEREBROVASCULAR ACCIDENT): ICD-10-CM

## 2022-07-06 PROCEDURE — 99212 OFFICE O/P EST SF 10 MIN: CPT | Mod: 25 | Performed by: PODIATRIST

## 2022-07-06 PROCEDURE — 11721 DEBRIDE NAIL 6 OR MORE: CPT | Performed by: PODIATRIST

## 2022-07-06 PROCEDURE — G0463 HOSPITAL OUTPT CLINIC VISIT: HCPCS | Mod: 25

## 2022-07-06 ASSESSMENT — PAIN SCALES - GENERAL: PAINLEVEL: NO PAIN (0)

## 2022-07-06 NOTE — NURSING NOTE
"Chief Complaint   Patient presents with     Toenail     DFE       Initial /85 (BP Location: Left arm, Patient Position: Sitting, Cuff Size: Adult Regular)   Pulse 97   Temp 97  F (36.1  C) (Tympanic)   SpO2 98%  Estimated body mass index is 41.73 kg/m  as calculated from the following:    Height as of 5/23/22: 1.88 m (6' 2\").    Weight as of 5/23/22: 147.4 kg (325 lb).  Medication Reconciliation: amira August  "

## 2022-07-06 NOTE — PROGRESS NOTES
Chief complaint: Patient presents with:  Toenail: DFE      History of Present Illness: This 72 year old NIDDM male is seen for follow-up management of elongated toenails and a diabetic foot exam.     Patient says he has a lot of numbness in his feet.     He is using Diabetic Gold [employer] Foot Lotion a couple times a week because he thinks he has dry skin on his feet. He has moderate flaking of the skin on both feet.    Patient is also here for a diabetic foot exam and high risk nail debridement. He prefers to have the nails trimmed every eight months because he says his nails do not grow fast. He was scheduled to follow-up in six months, but he says he pushed it out two more months because he did not think they were long enough for trimming.    Patient says his most recent DM shoes are a few years old and he is ready for a new pair.     No further pedal complaints today.          Last HbA1C was 6.8% on 04/11/2022.    ---Previously 7.5% on 07/07/2021.        /85 (BP Location: Left arm, Patient Position: Sitting, Cuff Size: Adult Regular)   Pulse 97   Temp 97  F (36.1  C) (Tympanic)   SpO2 98%      Patient Active Problem List   Diagnosis     Benign essential hypertension     Type 2 diabetes mellitus      Chronic fatigue     Slurred speech x 3 months, CT neg ED [facility] 4/25/18     Vision changes x 3 months as of 4/18     Chronic bilateral low back pain with sciatica     Altered gait     Altered mental status - since approx 1/18 - delayed response, change in mentation     Hypertrophic cardiomyopathy at 2.2 cm     Ascending aortic aneurysm at 4.45 cm on echo from 1/7/19     Right ventricular enlargement     Stage 3a chronic kidney disease (H)     History of tobacco abuse     H/O ETOH abuse     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 No pertinent surgical history.    Current Outpatient Medications   Medication     amLODIPine (NORVASC) 10 MG tablet     aspirin (ASA) 81 MG chewable tablet     atorvastatin (LIPITOR) 40 MG tablet     Cholecalciferol (VITAMIN D-3) 1000 units CAPS     CINNAMON PO     Garlic 1000 MG CAPS     lisinopril (ZESTRIL) 40 MG tablet     metFORMIN (GLUCOPHAGE XR) 500 MG 24 hr tablet     metoprolol tartrate (LOPRESSOR) 50 MG tablet     order for DME     tamsulosin (FLOMAX) 0.4 MG capsule     Turmeric 500 MG CAPS     TURMERIC CURCUMIN PO     No current facility-administered medications for this visit.     Facility-Administered Medications Ordered in Other Visits   Medication     iopamidol (ISOVUE-370) solution 75 mL        No Known Allergies    Family History   Problem Relation Age of Onset     Colon Cancer Mother      Kidney Cancer Father      Kidney Disease Father      Coronary Artery Disease Brother      Pulmonary Embolism Brother      Coronary Artery Disease Brother      Myocardial Infarction Brother        Social History     Socioeconomic History     Marital status: Single     Spouse name: None     Number of children: None     Years of education: None     Highest education level: None   Occupational History     None   Social Needs     Financial resource strain: None     Food insecurity:     Worry: None     Inability: None     Transportation needs:     Medical: None     Non-medical: None   Tobacco Use     Smoking status: Current Some Day Smoker     Packs/day: 0.50     Types: Cigars     Start date: 2003     Last attempt to quit: 2018     Years since quittin.2     Smokeless tobacco: Never Used   Substance and Sexual Activity     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      Drug use: No     Sexual activity: Never   Lifestyle     Physical activity:     Days per week: None     Minutes per session:  None     Stress: None   Relationships     Social connections:     Talks on phone: None     Gets together: None     Attends Confucianist service: None     Active member of club or organization: None     Attends meetings of clubs or organizations: None     Relationship status: None     Intimate partner violence:     Fear of current or ex partner: None     Emotionally abused: None     Physically abused: None     Forced sexual activity: None   Other Topics Concern     Parent/sibling w/ CABG, MI or angioplasty before 65F 55M? Yes   Social History Narrative     None       ROS: 10 point ROS neg other than the symptoms noted above in the HPI.  EXAM  Constitutional: healthy, alert and no distress    Psychiatric: mentation appears normal and affect normal/bright    VASCULAR:  -Dorsalis pedis pulse +1/4 b/l  -Posterior tibial pulse +1/4 b/l  -Capillary refill time < 3 seconds to b/l hallux  -Hair growth Absent to b/l anterior legs and ankles  NEURO:  -Protective sensation intact with SWM +7/10 RIGHT and +8/10 LEFT on 07/06/2022  -Protective sensation intact with SWM +10/10 RIGHT and +10/10 LEFT on 11/17/2021  -Protective sensation intact with SWM +8/10 RIGHT and +8/10 LEFT on 01/15/2020  -Protective sensation intact with SWM +7/10 RIGHT and +9/10 LEFT on 09/18/2019  -Light touch sensation intact to b/l plantar forefoot  DERM:  -Skin diffusely dry and flaking to bilateral feet  ---Moderate flaking of skin to plantar toes, but moderately reduced in flaking skin to the bilateral plantar forefoot and bilateral plantar heel  -Skin temperature within normal limits bilaterally   -Skin mildly dry to bilateral feet  -Toenails elongated, thickened, dystrophic and discolored x 10 with subungual debris    MSK:  -Moderate decrease in arch height while patient is NWB  -Muscle strength of ankles +5/5 for dorsiflexion, plantarflexion, ABDUction and ADDuction  b/l    ============================================================    ASSESSMENT:  (L60.3) Onychodystrophy  (primary encounter diagnosis)    (L85.3) Xerosis of skin    (M21.41,  M21.42) Pes planus of both feet    (E11.9) Diabetes mellitus type 2, noninsulin dependent (H)    (E11.42) Diabetic polyneuropathy associated with type 2 diabetes mellitus (H)    (Z68.35) BMI 35.0-35.9,adult    (Z86.73) History of CVA 3/18    (N18.3) Chronic kidney disease, stage 3 (moderate) (H)        PLAN:  -Patient evaluated and examined. Treatment options discussed with no educational barriers noted.    -Diabetic Foot Education provided. This included checking the feet daily looking for new new blisters or wounds, wearing shoes at all times when walking including around the house, and avoiding lotion application between the toes. Any sign of infection in the foot warrant's the patient presenting to the ED as soon as possible.    -High risk toenail debridement x 10 toenails without incident on  07/06/2022    -DM shoes: Referral placed through the orthotist, Katharine Saavedra, on 07/06/2022 at the [facility] location per patient request    -Patient in agreement with the above treatment plan and all of patient's questions were answered.      RTC 8 months for diabetic foot exam and high risk nail debridement per patient request        Leonie Og, CARLOSM, DPM

## 2022-09-04 ENCOUNTER — HEALTH MAINTENANCE LETTER (OUTPATIENT)
Age: 72
End: 2022-09-04

## 2023-01-15 ENCOUNTER — HEALTH MAINTENANCE LETTER (OUTPATIENT)
Age: 73
End: 2023-01-15

## 2023-02-08 ENCOUNTER — NURSE TRIAGE (OUTPATIENT)
Dept: INTERNAL MEDICINE | Facility: OTHER | Age: 73
End: 2023-02-08

## 2023-02-08 DIAGNOSIS — G62.9 NEUROPATHY: Primary | ICD-10-CM

## 2023-02-08 RX ORDER — GABAPENTIN 300 MG/1
300 CAPSULE ORAL 3 TIMES DAILY
Qty: 90 CAPSULE | Refills: 1 | Status: SHIPPED | OUTPATIENT
Start: 2023-02-08 | End: 2023-04-12
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
## 2023-03-06 DIAGNOSIS — E11.8 TYPE 2 DIABETES MELLITUS WITH COMPLICATION, WITHOUT LONG-TERM CURRENT USE OF INSULIN (H): ICD-10-CM

## 2023-03-06 NOTE — TELEPHONE ENCOUNTER
Metformin       Last Written Prescription Date:  5/23/22  Last Fill Quantity: 180,   # refills: 3  Last Office Visit: 4/11/22  Future Office visit:    Next 5 appointments (look out 90 days)    Apr 12, 2023  2:30 PM  (Arrive by 2:15 PM)  SHORT with Sreedhar Mendieta DO  [facility] ([facility] ) [address] [facility] [id number] [phone]   May 17, 2023 11:00 AM  (Arrive by 10:45 AM)  Return Visit with Leonie Og DPM  [facility] ([facility] ) [address] [facility] [id number] [phone]

## 2023-03-07 RX ORDER — METFORMIN HCL 500 MG
1000 TABLET, EXTENDED RELEASE 24 HR ORAL 2 TIMES DAILY WITH MEALS
Qty: 360 TABLET | Refills: 3 | Status: SHIPPED | OUTPATIENT
Start: 2023-03-07 | End: 2024-01-12

## 2023-03-23 DIAGNOSIS — N40.0 BENIGN PROSTATIC HYPERPLASIA, UNSPECIFIED WHETHER LOWER URINARY TRACT SYMPTOMS PRESENT: ICD-10-CM

## 2023-03-23 DIAGNOSIS — Z86.73 HISTORY OF CVA (CEREBROVASCULAR ACCIDENT): ICD-10-CM

## 2023-03-23 DIAGNOSIS — I10 BENIGN ESSENTIAL HYPERTENSION: ICD-10-CM

## 2023-03-23 DIAGNOSIS — R47.81 SLURRED SPEECH: ICD-10-CM

## 2023-03-24 RX ORDER — ASPIRIN 81 MG/1
TABLET, CHEWABLE ORAL
Qty: 90 TABLET | Refills: 0 | Status: SHIPPED | OUTPATIENT
Start: 2023-03-24 | End: 2023-04-14

## 2023-03-24 RX ORDER — TAMSULOSIN HYDROCHLORIDE 0.4 MG/1
CAPSULE ORAL
Qty: 180 CAPSULE | Refills: 0 | Status: SHIPPED | OUTPATIENT
Start: 2023-03-24 | End: 2023-06-12

## 2023-03-28 DIAGNOSIS — I10 BENIGN ESSENTIAL HYPERTENSION: ICD-10-CM

## 2023-03-28 DIAGNOSIS — N40.0 BENIGN PROSTATIC HYPERPLASIA, UNSPECIFIED WHETHER LOWER URINARY TRACT SYMPTOMS PRESENT: ICD-10-CM

## 2023-03-28 RX ORDER — TAMSULOSIN HYDROCHLORIDE 0.4 MG/1
CAPSULE ORAL
Qty: 180 CAPSULE | Refills: 0 | OUTPATIENT
Start: 2023-03-28

## 2023-04-12 DIAGNOSIS — G62.9 NEUROPATHY: ICD-10-CM

## 2023-04-12 RX ORDER — GABAPENTIN 300 MG/1
300 CAPSULE ORAL 3 TIMES DAILY
Qty: 90 CAPSULE | Refills: 1 | Status: SHIPPED | OUTPATIENT
Start: 2023-04-12 | End: 2023-06-07

## 2023-04-12 NOTE — TELEPHONE ENCOUNTER
Gabapentin      Last Written Prescription Date:  2.8.23  Last Fill Quantity: #90,   # refills: 1  Last Office Visit: 4.11.22  Future Office visit:    Next 5 appointments (look out 90 days)    May 17, 2023 11:00 AM  (Arrive by 10:45 AM)  Return Visit with Leonie Og DPM  [facility] ([facility] ) [address] [facility] [address]  [phone]   Jun 05, 2023 11:00 AM  (Arrive by 10:45 AM)  SHORT with Sreedhar Mendieta DO  [facility] ([facility] ) [address] [facility] [id number] [phone]           Routing refill request to provider for review/approval because:  Drug not on the [facility], P or [facility] refill protocol or controlled substance

## 2023-04-14 DIAGNOSIS — E78.2 MIXED HYPERLIPIDEMIA: ICD-10-CM

## 2023-04-14 DIAGNOSIS — I10 BENIGN ESSENTIAL HYPERTENSION: ICD-10-CM

## 2023-04-14 DIAGNOSIS — Z86.73 HISTORY OF CVA (CEREBROVASCULAR ACCIDENT): ICD-10-CM

## 2023-04-14 DIAGNOSIS — R47.81 SLURRED SPEECH: ICD-10-CM

## 2023-04-17 RX ORDER — ASPIRIN 81 MG/1
TABLET, CHEWABLE ORAL
Qty: 90 TABLET | Refills: 3 | Status: SHIPPED | OUTPATIENT
Start: 2023-04-17 | End: 2024-04-22

## 2023-04-17 RX ORDER — AMLODIPINE BESYLATE 10 MG/1
10 TABLET ORAL DAILY
Qty: 90 TABLET | Refills: 3 | Status: SHIPPED | OUTPATIENT
Start: 2023-04-17 | End: 2023-06-12

## 2023-04-17 RX ORDER — ATORVASTATIN CALCIUM 40 MG/1
40 TABLET, FILM COATED ORAL DAILY
Qty: 90 TABLET | Refills: 3 | Status: SHIPPED | OUTPATIENT
Start: 2023-04-17 | End: 2023-12-14

## 2023-04-17 NOTE — TELEPHONE ENCOUNTER
Asa, Lipitor, Norvasc      Last Written Prescription Date:  3.24.23, 5.23.22, 5.23.22  Last Fill Quantity: #90, #90, #90,   # refills: 0,3,3  Last Office Visit: 5.23.22  Future Office visit:    Next 5 appointments (look out 90 days)    May 17, 2023 11:00 AM  (Arrive by 10:45 AM)  Return Visit with Leonie Og DPM  [facility] ([facility] ) [address] [facility] [address]  [phone]   Jun 05, 2023 11:00 AM  (Arrive by 10:45 AM)  SHORT with Sreedhar Mendieta DO  [facility] ([facility] ) [address] [facility] [address]  [phone]           Routing refill request to provider for review/approval because:

## 2023-04-21 ENCOUNTER — TELEPHONE (OUTPATIENT)
Dept: INTERNAL MEDICINE | Facility: OTHER | Age: 73
End: 2023-04-21

## 2023-04-21 NOTE — TELEPHONE ENCOUNTER
April 21, 2023    PCP out of office 6/5/23. Left message for patient to return call to reschedule.    -Nataly ALEXANDRE  [facility] Unit Coordinator

## 2023-04-29 ENCOUNTER — HOSPITAL ENCOUNTER (EMERGENCY)
Facility: HOSPITAL | Age: 73
Discharge: HOME | End: 2023-04-29
Attending: NURSE PRACTITIONER | Admitting: NURSE PRACTITIONER
Payer: COMMERCIAL

## 2023-04-29 ENCOUNTER — APPOINTMENT (OUTPATIENT)
Dept: ULTRASOUND IMAGING | Facility: HOSPITAL | Age: 73
End: 2023-04-29
Attending: NURSE PRACTITIONER
Payer: COMMERCIAL

## 2023-04-29 VITALS
RESPIRATION RATE: 18 BRPM | SYSTOLIC BLOOD PRESSURE: 154 MMHG | DIASTOLIC BLOOD PRESSURE: 87 MMHG | TEMPERATURE: 97.5 F | OXYGEN SATURATION: 97 % | HEART RATE: 77 BPM

## 2023-04-29 DIAGNOSIS — R22.42 LOCALIZED SWELLING OF LEFT LOWER EXTREMITY: ICD-10-CM

## 2023-04-29 PROCEDURE — 93971 EXTREMITY STUDY: CPT | Mod: LT

## 2023-04-29 PROCEDURE — 99213 OFFICE O/P EST LOW 20 MIN: CPT | Performed by: NURSE PRACTITIONER

## 2023-04-29 PROCEDURE — G0463 HOSPITAL OUTPT CLINIC VISIT: HCPCS | Mod: 25

## 2023-04-29 ASSESSMENT — ENCOUNTER SYMPTOMS
VOMITING: 0
NUMBNESS: 1
ACTIVITY CHANGE: 1
SHORTNESS OF BREATH: 0
FEVER: 0
CHILLS: 0
NAUSEA: 0

## 2023-04-29 ASSESSMENT — ACTIVITIES OF DAILY LIVING (ADL): ADLS_ACUITY_SCORE: 35

## 2023-04-29 NOTE — ED PROVIDER NOTES
History   No chief complaint on file.    HPI  Deon Culver is a 73 year old male who is accompanied per his wife and his daughter.  He presents with left lower leg swelling that they noticed today.  Denies pain or redness.  Denies injury other than he slid off his recliner this past week; does not believe he injured his leg.  No OTC medications have been taken or home interventions applied.  Denies previous left leg injury.  History of type 2 diabetes and hypertension.  Smoker.  Fourth COVID vaccination May, 2022.  No recent vaccines.  Denies fevers, chills, nausea, vomiting, chest pain, and shortness of breath.    Musculoskeletal problem/pain      Duration: today    Description  Location:  Left lower leg    Intensity:  0/10    Accompanying signs and symptoms: none    History  Previous similar problem: no   Previous evaluation:  none    Precipitating or alleviating factors:  Trauma or overuse: YES-  This past week slid off the recliner. Does not believe he injured his leg.  Aggravating factors include: none    Therapies tried and outcome: nothing     Allergies:  No Known Allergies    Problem List:    Patient Active Problem List    Diagnosis Date Noted     POWELL (dyspnea on exertion) 05/19/2021     Priority: Medium     Sedentary lifestyle 05/19/2021     Priority: Medium     Benign prostatic hyperplasia, unspecified whether lower urinary tract symptoms present 01/17/2020     Priority: Medium     Obesity (BMI 35.0-39.9) with comorbidity (H) 03/19/2019     Priority: Medium     Diastolic dysfunction grade 1 on 1/7/19 01/16/2019     Priority: Medium     Special screening for malignant neoplasms, colon 11/12/2018     Priority: Medium     History of CVA 3/18 07/25/2018     Priority: Medium     Hypertrophic cardiomyopathy at 2.2 cm 07/06/2018     Priority: Medium     Ascending aortic aneurysm at 4.45 cm on echo from 1/7/19 07/06/2018     Priority: Medium     Right ventricular enlargement 07/06/2018     Priority: Medium      Stage 3a chronic kidney disease (H) 07/06/2018     Priority: Medium     IMO Regulatory Load OCT 2020       History of tobacco abuse 07/06/2018     Priority: Medium     H/O ETOH abuse 07/06/2018     Priority: Medium     Benign essential hypertension 04/27/2018     Priority: Medium     Type 2 diabetes mellitus  04/27/2018     Priority: Medium     Chronic fatigue 04/27/2018     Priority: Medium     Slurred speech x 3 months, CT neg ED [facility] 4/25/18 04/27/2018     Priority: Medium     Vision changes x 3 months as of 4/18 04/27/2018     Priority: Medium     Chronic bilateral low back pain with sciatica 04/27/2018     Priority: Medium     Altered gait 04/27/2018     Priority: Medium     Altered mental status - since approx 1/18 - delayed response, change in mentation 04/27/2018     Priority: Medium        Past Medical History:    Past Medical History:   Diagnosis Date     Altered gait 4/27/2018     Altered mental status - since approx 1/18 - delayed response, change in mentation 4/27/2018     Benign essential hypertension 4/27/2018     Chronic bilateral low back pain with sciatica 4/27/2018     Chronic fatigue 4/27/2018     Obesity 4/27/2018     Slurred speech x 3 months, CT neg ED [facility] 4/25/18 4/27/2018     Stroke (H)      Type 2 diabetes mellitus with complication, without long-term current use of insulin (H) 4/27/2018     Vision changes x 3 months as of 4/18 4/27/2018       Past Surgical History:    History reviewed. No pertinent surgical history.    Family History:    Family History   Problem Relation Age of Onset     Colon Cancer Mother      Kidney Cancer Father      Kidney Disease Father      Coronary Artery Disease Brother      Pulmonary Embolism Brother      Coronary Artery Disease Brother      Myocardial Infarction Brother        Social History:  Marital Status:  Single [1]  Social History     Tobacco Use     Smoking status: Former     Packs/day: 0.50     Types: Cigars, Cigarettes     Start date:  2003     Quit date: 2018     Years since quittin.8     Smokeless tobacco: Former     Quit date: 2021   Substance Use Topics     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      Drug use: No        Medications:    amLODIPine (NORVASC) 10 MG tablet  aspirin (ASA) 81 MG chewable tablet  atorvastatin (LIPITOR) 40 MG tablet  Cholecalciferol (VITAMIN D-3) 1000 units CAPS  CINNAMON PO  gabapentin (NEURONTIN) 300 MG capsule  Garlic 1000 MG CAPS  lisinopril (ZESTRIL) 40 MG tablet  metFORMIN (GLUCOPHAGE XR) 500 MG 24 hr tablet  metoprolol tartrate (LOPRESSOR) 50 MG tablet  order for DME  tamsulosin (FLOMAX) 0.4 MG capsule  Turmeric 500 MG CAPS  TURMERIC CURCUMIN PO          Review of Systems   Constitutional: Positive for activity change. Negative for chills and fever.   Respiratory: Negative for shortness of breath.    Cardiovascular: Negative for chest pain.   Gastrointestinal: Negative for nausea and vomiting.   Musculoskeletal:        Left lower leg swollen   Neurological: Positive for numbness (chronic not worse than normal).       Physical Exam   BP: 154/87  Pulse: 77  Temp: 97.5  F (36.4  C)  Resp: 18  SpO2: 97 %      Physical Exam  Vitals and nursing note reviewed.   Constitutional:       General: He is not in acute distress.     Appearance: He is overweight.   Cardiovascular:      Rate and Rhythm: Normal rate and regular rhythm.      Pulses:           Dorsalis pedis pulses are detected w/ Doppler on the left side.        Posterior tibial pulses are detected w/ Doppler on the left side.      Heart sounds: Normal heart sounds. No murmur heard.  Pulmonary:      Effort: Pulmonary effort is normal. No respiratory distress.      Breath sounds: Normal breath sounds. No wheezing or rales.   Musculoskeletal:         General: Swelling (Left lower extremity/ankle has 2-3+ swelling) present. No tenderness.      Left knee: Swelling present. No erythema or ecchymosis. No tenderness.      Left lower leg: Swelling  (2-3+ nonpitting edema) present. No tenderness.      Left ankle: Swelling (2+) present. No tenderness. Normal pulse.      Left Achilles Tendon: Normal. No tenderness.      Comments: Left popliteal pulse obtained with Doppler   Feet:      Left foot:      Skin integrity: Callus and dry skin present. No erythema.      Toenail Condition: Left toenails are abnormally thick.   Skin:     General: Skin is warm and dry.      Capillary Refill: Capillary refill takes less than 2 seconds.      Findings: No bruising or erythema.   Neurological:      Mental Status: He is alert and oriented to person, place, and time.   Psychiatric:         Behavior: Behavior normal.         ED Course                 Procedures           Results for orders placed or performed during the hospital encounter of 04/29/23 (from the past 24 hour(s))   US Lower Extremity Venous Duplex Left    Narrative    EXAMINATION: DOPPLER VENOUS ULTRASOUND OF THE LEFT LOWER EXTREMITY,  4/29/2023 4:55 PM     COMPARISON: None.    HISTORY: left lower leg/ankle swollen, no pain or erythema. Chronic  numbness feet.  Hx DM and hypertension    TECHNIQUE:  Gray-scale evaluation with compression, spectral flow, and  color Doppler assessment of the deep venous system of the left leg  from groin to the calf.    FINDINGS:  Limited due to body habitus, unable to evaluate the common femoral  vein greater saphenous veins due to positioning.     On limited evaluation, the the left proximal common femoral vein is  patent and demonstrates normal compressibility.    In the left lower extremity, the femoral, popliteal and deep calf  veins demonstrate normal compressibility and blood flow. Subcutaneous  edema in the distal lower extremity.    In comparison, the right common femoral vein demonstrates normal  compressibility and normal blood flow.      Impression    IMPRESSION:  1.  Exam slightly limited due to body habitus. No evidence left lower  extremity deep venous thrombosis.   2.   Subcutaneous edema in the distal lower extremity.    TASIA MORIN MD         SYSTEM ID:  [id number]       Medications - No data to display    Assessments & Plan (with Medical Decision Making)     I have reviewed the nursing notes.    I have reviewed the findings, diagnosis, plan and need for follow up with the patient.    (R22.42) Localized swelling of left lower extremity  Comment: 73 year old male who is accompanied per his wife and his daughter.  He presents with left lower leg swelling that they noticed today.  Denies pain or redness.  Denies injury other than he slid off his recliner this past week; does not believe he injured his leg.  No OTC medications have been taken or home interventions applied.  Denies previous left leg injury.  History of type 2 diabetes and hypertension.  Smoker.  Fourth COVID vaccination May, 2022.  No recent vaccines.  Denies fevers, chills, nausea, vomiting, chest pain, and shortness of breath.    MDM:NHT. Lungs CTA  Left lower extremity/ankle has 2-3+ nonpitting edema/swelling.  Left popliteal and pedal pulse obtained per Doppler    Left lower extremity ultrasound per radiology;  1.  Exam slightly limited due to body habitus. No evidence left lower  extremity deep venous thrombosis.   2.  Subcutaneous edema in the distal lower extremity.    Plan: Follow-up with primary care provider early next week.  Ace wrap to lower extremity until edema resolves  Elevate lower extremity  Return to ER/urgent care for worsening of symptoms, shortness of breath, or chest pain.  These discharge instructions and medications were reviewed with him and his wife and daughter and understanding verbalized.    This document was prepared using a combination of typing and voice generated software.  While every attempt was made for accuracy, spelling and grammatical errors may exist.    Medical Decision Making  The patient's presentation was of straightforward complexity (a clearly self-limited or minor  problem).    The patient's evaluation involved:  ordering and/or review of 1 test(s) in this encounter (see separate area of note for details)    The patient's management necessitated only low risk treatment.    New Prescriptions    No medications on file       Final diagnoses:   Localized swelling of left lower extremity       4/29/2023   [facility]       Jennifer Joyce, CNP  04/29/23 0088

## 2023-04-29 NOTE — ED TRIAGE NOTES
Pt presents with c/o upper left leg swelling.  Pt denies any pain  Pt did fall a few days ago but pt states did not hurt leg when it happened  States all lot more numbness and tingling in that leg   Pt worried about blood clots   No hx of blood clots.   Pt states has had on and off swelling for years but the swelling on the upper leg was just noticed yesterday.

## 2023-04-29 NOTE — DISCHARGE INSTRUCTIONS
Ace wrap to lower extremity until edema resolves  Elevate lower extremity  Return to ER/urgent care for worsening of symptoms, shortness of breath, or chest pain.

## 2023-04-29 NOTE — ED TRIAGE NOTES
Patient presents with c/o left leg being swollen. Family first noticed swelling today. No c/o pain, redness, or swelling. Patient has neuropathy, denies any new or absent sensations.

## 2023-06-03 ENCOUNTER — HEALTH MAINTENANCE LETTER (OUTPATIENT)
Age: 73
End: 2023-06-03

## 2023-06-06 ENCOUNTER — TELEPHONE (OUTPATIENT)
Dept: FAMILY MEDICINE | Facility: OTHER | Age: 73
End: 2023-06-06

## 2023-06-06 DIAGNOSIS — Z86.73 HISTORY OF CVA (CEREBROVASCULAR ACCIDENT): ICD-10-CM

## 2023-06-06 DIAGNOSIS — E11.8 TYPE 2 DIABETES MELLITUS WITH COMPLICATION, WITHOUT LONG-TERM CURRENT USE OF INSULIN (H): ICD-10-CM

## 2023-06-06 DIAGNOSIS — I71.21 ASCENDING AORTIC ANEURYSM (H): ICD-10-CM

## 2023-06-06 DIAGNOSIS — I10 BENIGN ESSENTIAL HYPERTENSION: ICD-10-CM

## 2023-06-06 DIAGNOSIS — I51.89 DIASTOLIC DYSFUNCTION: ICD-10-CM

## 2023-06-06 DIAGNOSIS — N40.0 BENIGN PROSTATIC HYPERPLASIA, UNSPECIFIED WHETHER LOWER URINARY TRACT SYMPTOMS PRESENT: ICD-10-CM

## 2023-06-06 NOTE — TELEPHONE ENCOUNTER
Update received from Registration Staff, Florencia reporting receiving a phone call from Nataly, maria t daughter requesting to cancel scheduled appointment with Dr. Richey on 6/6/23 at 200 pm, as patient experienced a fall when getting ready to come to appt. Nataly reported patient fell and hit his head on the car.     Dr. Mendieta attempted to return call to patient with no answer, RN CC attempted to return call to patient at 157 pm with no answer, message left requesting a return call.    Awaiting return call to discuss update on patients status and/or need to go to ED to be evaluated due to hitting head and being on ASA.

## 2023-06-06 NOTE — TELEPHONE ENCOUNTER
Received return call from Nataly reporting patient tripped on sidewalk outside hitting head on car, patient developed scrapes on forehead from glasses when hitting his head. Denies nausea or vomiting, patient is alert and oriented. Advised to go to the ED per Dr. Mendieta. Nataly reporting patient is stubborn and may refuse to go to ED.     Dr. Mendieta then spoke with patient via telephone advising patient to go to the ED, unsure if patient will take advice.

## 2023-06-07 RX ORDER — METOPROLOL TARTRATE 50 MG
TABLET ORAL
Qty: 180 TABLET | Refills: 3 | Status: CANCELLED | OUTPATIENT
Start: 2023-06-07

## 2023-06-09 NOTE — TELEPHONE ENCOUNTER
lisinopril (ZESTRIL) 40 MG tablet  The original prescription was reordered on 6/7/2023 by Sreedhar Mendieta DO. Renewing this prescription may not be appropriate.   Last Written Prescription Date:  6/07/2023  Last Fill Quantity: 90,   # refills: 3  Last Office Visit: 6/06/2023  Future Office visit:    Next 5 appointments (look out 90 days)    Jul 19, 2023  1:15 PM  (Arrive by 1:00 PM)  Return Visit with Leonie Og DPM  [facility] ([facility] ) [address] [place] [id number] [phone]         tamsulosin (FLOMAX) 0.4 MG capsule      Last Written Prescription Date:  3/24/2023  Last Fill Quantity: 180,   # refills: 0    amLODIPine (NORVASC) 10 MG tablet      Last Written Prescription Date:  4/17/2023  Last Fill Quantity: 90,   # refills: 3

## 2023-06-12 RX ORDER — TAMSULOSIN HYDROCHLORIDE 0.4 MG/1
0.8 CAPSULE ORAL DAILY
Qty: 180 CAPSULE | Refills: 0 | Status: SHIPPED | OUTPATIENT
Start: 2023-06-12 | End: 2023-08-23

## 2023-06-12 RX ORDER — LISINOPRIL 40 MG/1
40 TABLET ORAL DAILY
Qty: 90 TABLET | Refills: 3 | Status: SHIPPED | OUTPATIENT
Start: 2023-06-12 | End: 2024-02-13

## 2023-06-12 RX ORDER — AMLODIPINE BESYLATE 10 MG/1
TABLET ORAL
Qty: 90 TABLET | Refills: 3 | Status: SHIPPED | OUTPATIENT
Start: 2023-06-12 | End: 2023-10-31

## 2023-07-23 ENCOUNTER — HEALTH MAINTENANCE LETTER (OUTPATIENT)
Age: 73
End: 2023-07-23

## 2023-08-03 ENCOUNTER — TELEPHONE (OUTPATIENT)
Dept: INTERNAL MEDICINE | Facility: OTHER | Age: 73
End: 2023-08-03

## 2023-08-03 NOTE — TELEPHONE ENCOUNTER
8:17 AM    Reason for Call: Phone Call    Description: Patient's daughter called in asking if her father's form that she dropped off last week had been filled out as it's due to the county by 8-4-23. Please call daughter back.    Was an appointment offered for this call? No  If yes : Appointment type              Date    Preferred method for responding to this message: Telephone Call  What is your phone number ? [phone]     If we cannot reach you directly, may we leave a detailed response at the number you provided? Yes    Can this message wait until your PCP/provider returns, if available today? Mone Zaidi

## 2023-08-07 NOTE — TELEPHONE ENCOUNTER
Call returned to Nataly, maria t daughter. Message left requesting a return call.     Special Diet Form is signed by Dr. Mendieta and left at  for patient to .     Awaiting return call.

## 2023-08-07 NOTE — TELEPHONE ENCOUNTER
Call returned to DaughterNataly with no answer. A call will be returned to provide an update the form is at the Registration Desk, [facility].

## 2023-08-08 NOTE — TELEPHONE ENCOUNTER
Call placed to Nataly, patients Daughter to Schedule Telephone Visit with Dr. Mendieta to discuss Care Plan.     Appointment scheduled:    Next 5 appointments (look out 90 days)      Sep 20, 2023  1:00 PM  (Arrive by 12:45 PM)  Return Visit with Leonie Og DPM  [facility] ([facility] ) [address] [place] [id number] [phone]          Nataly also discussing patients left leg being swollen, however, patient has been evaluated in ED for the swelling.

## 2023-08-21 DIAGNOSIS — N40.0 BENIGN PROSTATIC HYPERPLASIA, UNSPECIFIED WHETHER LOWER URINARY TRACT SYMPTOMS PRESENT: ICD-10-CM

## 2023-08-21 DIAGNOSIS — I10 BENIGN ESSENTIAL HYPERTENSION: ICD-10-CM

## 2023-08-21 DIAGNOSIS — G62.9 NEUROPATHY: ICD-10-CM

## 2023-08-23 RX ORDER — GABAPENTIN 300 MG/1
300 CAPSULE ORAL 3 TIMES DAILY
Qty: 90 CAPSULE | Refills: 0 | Status: SHIPPED | OUTPATIENT
Start: 2023-08-23 | End: 2023-11-09

## 2023-08-23 RX ORDER — TAMSULOSIN HYDROCHLORIDE 0.4 MG/1
0.8 CAPSULE ORAL DAILY
Qty: 180 CAPSULE | Refills: 0 | Status: SHIPPED | OUTPATIENT
Start: 2023-08-23 | End: 2023-11-09

## 2023-08-23 NOTE — TELEPHONE ENCOUNTER
Neurontin      Last Written Prescription Date:  06/07/23  Last Fill Quantity: 90,   # refills: 1  Last Office Visit: 06/06/23  Future Office visit:    Next 5 appointments (look out 90 days)      Sep 20, 2023  1:00 PM  (Arrive by 12:45 PM)  Return Visit with Leonie Og DPM  [facility] ([facility] ) [address] [address]  [phone]             Flomax      Last Written Prescription Date:  06/12/23  Last Fill Quantity: 180,   # refills: 0  Last Office Visit: 06/06/23  Future Office visit:    Next 5 appointments (look out 90 days)      Sep 20, 2023  1:00 PM  (Arrive by 12:45 PM)  Return Visit with Leonie Og DPM  [facility] ([facility] ) [address] [address]  [phone]

## 2023-08-25 DIAGNOSIS — N40.0 BENIGN PROSTATIC HYPERPLASIA, UNSPECIFIED WHETHER LOWER URINARY TRACT SYMPTOMS PRESENT: ICD-10-CM

## 2023-08-25 DIAGNOSIS — I10 BENIGN ESSENTIAL HYPERTENSION: ICD-10-CM

## 2023-08-25 DIAGNOSIS — G62.9 NEUROPATHY: ICD-10-CM

## 2023-08-28 RX ORDER — GABAPENTIN 300 MG/1
300 CAPSULE ORAL 3 TIMES DAILY
Qty: 90 CAPSULE | Refills: 0 | OUTPATIENT
Start: 2023-08-28

## 2023-08-28 RX ORDER — TAMSULOSIN HYDROCHLORIDE 0.4 MG/1
0.8 CAPSULE ORAL DAILY
Qty: 180 CAPSULE | Refills: 0 | OUTPATIENT
Start: 2023-08-28

## 2023-08-31 ENCOUNTER — DOCUMENTATION ONLY (OUTPATIENT)
Dept: CARE COORDINATION | Facility: CLINIC | Age: 73
End: 2023-08-31
Payer: COMMERCIAL

## 2023-08-31 NOTE — PROGRESS NOTES
Regarding Skilled services PT, OT, SN, we are unable to accept this referral due to our current staffing shortage.  Therapies currently have a 15 mile radius.  Client would also need audio and video virtual F2F.  Regarding Palliative Maintenance Program, client was interested in program, but after review of his chart by Palliative Physician he currently does not qualify.  He could be referred to another home care agency for the skilled services after he has a F2F. Called client and informed of the above.  Thank you for these referrals.    Hydroxychloroquine Counseling:  I discussed with the patient that a baseline ophthalmologic exam is needed at the start of therapy and every year thereafter while on therapy. A CBC may also be warranted for monitoring.  The side effects of this medication were discussed with the patient, including but not limited to agranulocytosis, aplastic anemia, seizures, rashes, retinopathy, and liver toxicity. Patient instructed to call the office should any adverse effect occur.  The patient verbalized understanding of the proper use and possible adverse effects of Plaquenil.  All the patient's questions and concerns were addressed.

## 2023-09-01 ENCOUNTER — TELEPHONE (OUTPATIENT)
Dept: INTERNAL MEDICINE | Facility: OTHER | Age: 73
End: 2023-09-01

## 2023-09-01 NOTE — TELEPHONE ENCOUNTER
[facility] called they cannot do pt/ot at home due to shortage of staff please call her with any questions *[phone]

## 2023-09-13 ENCOUNTER — VIRTUAL VISIT (OUTPATIENT)
Dept: INTERNAL MEDICINE | Facility: OTHER | Age: 73
End: 2023-09-13
Attending: INTERNAL MEDICINE
Payer: COMMERCIAL

## 2023-09-13 DIAGNOSIS — I10 BENIGN ESSENTIAL HYPERTENSION: ICD-10-CM

## 2023-09-13 DIAGNOSIS — I42.2 HYPERTROPHIC CARDIOMYOPATHY (H): ICD-10-CM

## 2023-09-13 DIAGNOSIS — E11.8 TYPE 2 DIABETES MELLITUS WITH COMPLICATION, WITHOUT LONG-TERM CURRENT USE OF INSULIN (H): ICD-10-CM

## 2023-09-13 DIAGNOSIS — N18.31 STAGE 3A CHRONIC KIDNEY DISEASE (H): ICD-10-CM

## 2023-09-13 DIAGNOSIS — I51.89 DIASTOLIC DYSFUNCTION: ICD-10-CM

## 2023-09-13 DIAGNOSIS — E66.01 MORBID OBESITY (H): Primary | ICD-10-CM

## 2023-09-13 PROCEDURE — 99215 OFFICE O/P EST HI 40 MIN: CPT | Mod: 95 | Performed by: INTERNAL MEDICINE

## 2023-09-13 NOTE — PROGRESS NOTES
Mendoza is a 73 year old who is being evaluated via a billable video visit.      How would you like to obtain your AVS? MyChart  If the video visit is dropped, the invitation should be resent by: Text to cell phone: [phone]  Will anyone else be joining your video visit? No          Assessment & Plan   Problem List Items Addressed This Visit          Digestive    Obesity (BMI 35.0-39.9) with comorbidity (H) - Primary    Relevant Orders    Home Care Referral    Palliative Care Referral       Endocrine    Type 2 diabetes mellitus     Relevant Orders    Home Care Referral    Palliative Care Referral       Circulatory    Benign essential hypertension    Relevant Orders    Home Care Referral    Palliative Care Referral    Hypertrophic cardiomyopathy at 2.2 cm    Relevant Orders    Home Care Referral    Palliative Care Referral    Diastolic dysfunction grade 1 on 1/7/19    Relevant Orders    Home Care Referral    Palliative Care Referral       Urinary    Stage 3a chronic kidney disease (H)    Relevant Orders    Home Care Referral    Palliative Care Referral          15 minutes spent by me on the date of the encounter doing chart review, review of test results, interpretation of tests, patient visit, and documentation            No follow-ups on file.    Sreedhar Mendieta, [facility] [place] [facility]    Subjective   Mendoza is a 73 year old, presenting for the following health issues:  No chief complaint on file.      HPI     Mendoza was seen today via video visit.  He remains stable however is confined to his basement.  He does have a history of type 2 diabetes mellitus, hypertension, cardiomyopathy, ascending aortic aneurysm, stage III chronic kidney disease, history of CVA and altered gait.  Tempted to try to get home care and/or palliative care resources available for him however it was stated that of video visit with face-to-face was required which is completed today.  Patient otherwise has no complaints.   He has great difficulty in coming into clinic which is almost impossible for him.  Patient is due for lab work however this time he cannot come in to clinic hence hopeful that we can get resources sent to his home for lab collection.    Concern - Face to Face   Onset: ongoing   Description: home care   Intensity: severe  Progression of Symptoms:  worse   Accompanying Signs & Symptoms: need for nursing care at home vs palliative care   Previous history of similar problem: yes   Precipitating factors:        Worsened by: not having home care   Alleviating factors:        Improved by: NA  Therapies tried and outcome: None        Review of Systems   Constitutional, HEENT, cardiovascular, pulmonary, gi and gu systems are negative, except as otherwise noted.      Objective           Vitals:  No vitals were obtained today due to virtual visit.    Physical Exam   GENERAL: Healthy, alert and no distress  PSYCH: Mentation appears normal, affect normal/bright, judgement and insight intact, normal speech and appearance well-groomed.    Hospital Outpatient Visit on 05/20/2022   Component Date Value Ref Range Status    LVEF  05/20/2022 60-65%   Final     No results found for any visits on 09/13/23.  No results found for this or any previous visit (from the past 24 hour(s)).            Video-Visit Details    Type of service:  Video Visit   Video Start Time:  9:07  Video End Time:8:13 AM    Originating Location (pt. Location): Home    Distant Location (provider location):  On-site  Platform used for Video Visit: Fernanda

## 2023-09-19 ENCOUNTER — TELEPHONE (OUTPATIENT)
Dept: INTERNAL MEDICINE | Facility: OTHER | Age: 73
End: 2023-09-19

## 2023-09-19 NOTE — TELEPHONE ENCOUNTER
9:25 AM    Reason for Call: Phone Call    Description: Patient is calling regarding his referrals to Home Care and Pallative Care referrals he hasn't heard anything yet and wondering what is the status of these referrals.      Preferred method for responding to this message: Telephone Call  What is your phone number ? [phone]    If we cannot reach you directly, may we leave a detailed response at the number you provided? No    Can this message wait until your PCP/provider returns, if available today? YES, No

## 2023-09-21 NOTE — TELEPHONE ENCOUNTER
Placed telephone call to patient to update the referral was sent.  No answer, message left and will attempt to call back at a later time.

## 2023-09-27 ENCOUNTER — MYC MEDICAL ADVICE (OUTPATIENT)
Dept: INTERNAL MEDICINE | Facility: OTHER | Age: 73
End: 2023-09-27

## 2023-09-27 DIAGNOSIS — I51.89 DIASTOLIC DYSFUNCTION: ICD-10-CM

## 2023-09-27 DIAGNOSIS — I10 BENIGN ESSENTIAL HYPERTENSION: ICD-10-CM

## 2023-09-27 DIAGNOSIS — E11.8 TYPE 2 DIABETES MELLITUS WITH COMPLICATION, WITHOUT LONG-TERM CURRENT USE OF INSULIN (H): ICD-10-CM

## 2023-09-27 DIAGNOSIS — N18.31 STAGE 3A CHRONIC KIDNEY DISEASE (H): ICD-10-CM

## 2023-09-27 DIAGNOSIS — E66.01 MORBID OBESITY (H): Primary | ICD-10-CM

## 2023-09-27 DIAGNOSIS — I42.2 HYPERTROPHIC CARDIOMYOPATHY (H): ICD-10-CM

## 2023-09-28 NOTE — TELEPHONE ENCOUNTER
Please see my chart messagePended referrals per .    This would need to be pended to PCP and sent to another HC agency, I would suggest [facility]. We have a list of homecare agencies in the [facility] office for reference if you would like!

## 2023-10-03 NOTE — TELEPHONE ENCOUNTER
"Please reference [facility]'s note from 8/31/2023:    \"Regarding Skilled services PT, OT, SN, we are unable to accept this referral due to our current staffing shortage. Therapies currently have a 15 mile radius. Client would also need audio and video virtual F2F.     Regarding Palliative Maintenance Program, client was interested in program, but after review of his chart by Palliative Physician he currently does not qualify. He could be referred to another home care agency for the skilled services after he has a F2F. Called client and informed of the above. Thank you for these referrals.\"    Looks like [facility] was unable to take him due to staffing, it is my understanding that this is temporary while they resolve staffing issues. However, Palliative Care denial was not due to staffing, as noted above. I am unaware of any additional palliative agencies on the [place].     There is no further outreach needed for this SW at this time.     Claudette Caldwell, ESTEFANYW  [facility]    "

## 2023-10-03 NOTE — TELEPHONE ENCOUNTER
Called pt and updated he has ASA on file at pharmacy. He verbalized understanding.    He states he is trying to get on Palliative care and no one has called him.Pt needs new referral from MD for this in the [facility] area. [facility] only taking [place] cases    He also states he is trying to get homecare.Updated him that a new referral for this was placed on 9.29.2023 for [facility] care. Will have [facility] resend.    Need new referral for Palliative care in the [facility].    [facility] to [facility].    Suzanne HEREDIA RN

## 2023-10-26 ENCOUNTER — TELEPHONE (OUTPATIENT)
Dept: INTERNAL MEDICINE | Facility: OTHER | Age: 73
End: 2023-10-26

## 2023-10-26 NOTE — TELEPHONE ENCOUNTER
12:33 PM    Reason for Call: Phone Call    Description: Ken from [facility] called and is requesting start of care verbal orders for skilled nursing and OT starting Monday.     Was an appointment offered for this call? No    Preferred method for responding to this message: Telephone Call  What is your phone number ?[phone] - Ken     If we cannot reach you directly, may we leave a detailed response at the number you provided? Yes    Can this message wait until your PCP/provider returns, if available today? Mone Cross

## 2023-10-30 ENCOUNTER — MEDICAL CORRESPONDENCE (OUTPATIENT)
Dept: HEALTH INFORMATION MANAGEMENT | Facility: CLINIC | Age: 73
End: 2023-10-30

## 2023-10-30 ENCOUNTER — MEDICAL CORRESPONDENCE (OUTPATIENT)
Dept: HEALTH INFORMATION MANAGEMENT | Facility: HOSPITAL | Age: 73
End: 2023-10-30

## 2023-10-30 DIAGNOSIS — Z53.9 PERSONS ENCOUNTERING HEALTH SERVICES FOR SPECIFIC PROCEDURES, NOT CARRIED OUT: Primary | ICD-10-CM

## 2023-10-30 PROCEDURE — G0180 MD CERTIFICATION HHA PATIENT: HCPCS | Performed by: INTERNAL MEDICINE

## 2023-10-31 ENCOUNTER — TELEPHONE (OUTPATIENT)
Dept: INTERNAL MEDICINE | Facility: OTHER | Age: 73
End: 2023-10-31

## 2023-10-31 ENCOUNTER — MYC MEDICAL ADVICE (OUTPATIENT)
Dept: CARDIOLOGY | Facility: OTHER | Age: 73
End: 2023-10-31

## 2023-10-31 DIAGNOSIS — N18.31 STAGE 3A CHRONIC KIDNEY DISEASE (H): ICD-10-CM

## 2023-10-31 DIAGNOSIS — R06.09 DOE (DYSPNEA ON EXERTION): Primary | ICD-10-CM

## 2023-10-31 DIAGNOSIS — I51.89 DIASTOLIC DYSFUNCTION: ICD-10-CM

## 2023-10-31 DIAGNOSIS — E11.8 TYPE 2 DIABETES MELLITUS WITH COMPLICATION, WITHOUT LONG-TERM CURRENT USE OF INSULIN (H): ICD-10-CM

## 2023-10-31 DIAGNOSIS — R60.0 PERIPHERAL EDEMA: ICD-10-CM

## 2023-10-31 DIAGNOSIS — N40.0 BENIGN PROSTATIC HYPERPLASIA, UNSPECIFIED WHETHER LOWER URINARY TRACT SYMPTOMS PRESENT: ICD-10-CM

## 2023-10-31 DIAGNOSIS — I10 BENIGN ESSENTIAL HYPERTENSION: ICD-10-CM

## 2023-10-31 DIAGNOSIS — Z13.220 LIPID SCREENING: ICD-10-CM

## 2023-10-31 DIAGNOSIS — R53.82 CHRONIC FATIGUE: ICD-10-CM

## 2023-10-31 RX ORDER — HYDROCHLOROTHIAZIDE 25 MG/1
25 TABLET ORAL DAILY
Qty: 90 TABLET | Refills: 3 | Status: SHIPPED | OUTPATIENT
Start: 2023-10-31 | End: 2023-11-22 | Stop reason: ALTCHOICE

## 2023-10-31 RX ORDER — AMLODIPINE BESYLATE 5 MG/1
5 TABLET ORAL DAILY
Qty: 90 TABLET | Refills: 3 | Status: SHIPPED | OUTPATIENT
Start: 2023-10-31

## 2023-10-31 NOTE — TELEPHONE ENCOUNTER
[facility] is calling needing verbal orders for PT to be added to the OT to the plan of care. Please let [facility] know that these are placed.[phone]

## 2023-11-02 ENCOUNTER — TELEPHONE (OUTPATIENT)
Dept: INTERNAL MEDICINE | Facility: OTHER | Age: 73
End: 2023-11-02

## 2023-11-02 NOTE — TELEPHONE ENCOUNTER
Call from LEN Wolf with Sergio    Requesting verbal orders for PT    Starting 11/2/23  1 x week X  6 weeks    Treatment techniques to include:  therapeutic exercise  therapeutic activity      Ok for verbals orders given by Katharine Lagos RN

## 2023-11-06 ENCOUNTER — MEDICAL CORRESPONDENCE (OUTPATIENT)
Dept: HEALTH INFORMATION MANAGEMENT | Facility: HOSPITAL | Age: 73
End: 2023-11-06

## 2023-11-06 NOTE — LETTER
6/25/2021       RE: Deon Culver  [address]  [facility] [address]     Dear Colleague,    Thank you for referring your patient, Deon Culver, to the [facility]UROLOG[facility]. Please see a copy of my visit note below.    [facility] Neurology Follow Up Visit    Deon Culver MRN# [id number]   Age: 71 year old YOB: 1950     Brief history of symptoms: The patient was initially seen in neurologic consultation on 11/23/2020 with myself for evaluation of CVA (left medullary), and chronic lumbar radiculopathy which was non-symptomatic at the time . Please see the comprehensive neurologic consultation notes from those dates in the Epic records for details.  He was recommended to have PT for deconditioning, and a repeat MRI to monitor prior stroke progression.    Interval history: MRI brain was not done    Today, The patient isn't doing PT and rides a work-out bicycle.  He didn't feel that PT was helpful.  The patient feels that stairs are more difficult to go up and down on.  He feels that has gained a large degree of weight, which is contributing to his difficulty with walking. He doesn't have any shooting pain in his back to his legs.       Physical Exam:   Vitals: BP (!) 152/84   Pulse 79   Resp 16   Wt (!) 160.1 kg (353 lb)   SpO2 95%   BMI 45.32 kg/m    General: Seated comfortably in no acute distress.  HEENT: Neck supple with normal range of motion.   Skin: No rashes  Neurologic:     Mental Status: Fully alert, attentive and oriented. Speech clear and fluent, no paraphasic errors.      Cranial Nerves: EOMI with normal smooth pursuit. Ptosis over left eye, pupil is reactive. Facial movements symmetric. Hearing not formally tested but intact to conversation. Subtle vagal/glossopharyngeal Cah phoneme slurring, no cerebellar slurring.     Motor: No tremors or other abnormal movements observed.      Sensory:Negative  Romberg.      DTR: 1+ patellar b/l, absent achilles.     Coordination: Finger-nose-finger without dysmetria.     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  I suspect this is related to diabetes versus another infarction, but we will obtain a repeat MRI in any case considering the prior recommended imaging was never done.  Overall, the patient doesn't describe woresend or new stroke symptoms or radicular symptoms, and would benefit most from weight loss.  He is agreeable to having his PCP order a dietician or nutritionist consult, and will attend PT if I order it for him.     Plan:  PT  MRI brain w/wout contrast  PCP to consider nutritionist or dietician  Consider Ophthalmology referral if ptosis increases or diplopia is noted    Follow up in Neurology clinic in one year (virtually) or earlier as needed should new concerns arise.    JIA Fernandez D.O.   of Neurology    Total time today (31 min) in this patient encounter was spent on pre-charting, counseling and/or coordination of care.         Again, thank you for allowing me to participate in the care of your patient.      Sincerely,    Sreedhar Fernandez, DO       No

## 2023-11-08 ENCOUNTER — MYC MEDICAL ADVICE (OUTPATIENT)
Dept: CARDIOLOGY | Facility: OTHER | Age: 73
End: 2023-11-08

## 2023-11-09 ENCOUNTER — TELEPHONE (OUTPATIENT)
Dept: INTERNAL MEDICINE | Facility: OTHER | Age: 73
End: 2023-11-09

## 2023-11-09 DIAGNOSIS — N40.0 BENIGN PROSTATIC HYPERPLASIA, UNSPECIFIED WHETHER LOWER URINARY TRACT SYMPTOMS PRESENT: ICD-10-CM

## 2023-11-09 DIAGNOSIS — E11.8 TYPE 2 DIABETES MELLITUS WITH COMPLICATION, WITHOUT LONG-TERM CURRENT USE OF INSULIN (H): Primary | ICD-10-CM

## 2023-11-09 DIAGNOSIS — G62.9 NEUROPATHY: ICD-10-CM

## 2023-11-09 DIAGNOSIS — I10 BENIGN ESSENTIAL HYPERTENSION: ICD-10-CM

## 2023-11-09 RX ORDER — TAMSULOSIN HYDROCHLORIDE 0.4 MG/1
0.8 CAPSULE ORAL DAILY
Qty: 180 CAPSULE | Refills: 0 | Status: SHIPPED | OUTPATIENT
Start: 2023-11-09 | End: 2024-02-01

## 2023-11-09 RX ORDER — GABAPENTIN 300 MG/1
300 CAPSULE ORAL 3 TIMES DAILY
Qty: 90 CAPSULE | Refills: 0 | Status: SHIPPED | OUTPATIENT
Start: 2023-11-09 | End: 2023-12-12

## 2023-11-09 NOTE — TELEPHONE ENCOUNTER
Requesting an order for an A1C drawn in his home.    He hasn't been able to leave his home and she will transfer it to [facility]  [phone]

## 2023-11-09 NOTE — TELEPHONE ENCOUNTER
Flomax      Last Written Prescription Date:  8/23/23  Last Fill Quantity: 180,   # refills: 0  Last Office Visit: 9/13/23  Future Office visit:    Next 5 appointments (look out 90 days)      Nov 22, 2023 10:30 AM  (Arrive by 10:15 AM)  [facility] [place] [facility] [place] [facility] [address]  [place] [address]  [phone]     Dec 06, 2023  9:45 AM  (Arrive by 9:30 AM)  Return Visit with Leonie Og DPM  [facility] ([facility] ) [address] [facility] [address]  [phone]             Routing refill request to provider for review/approval because:        Gabapentin      Last Written Prescription Date:  8/23/23  Last Fill Quantity: 90,   # refills: 0  Last Office Visit: 9/13/23  Future Office visit:    Next 5 appointments (look out 90 days)      Nov 22, 2023 10:30 AM  (Arrive by 10:15 AM)  [facility] [place] [facility] [place] [facility] [address]  [place] [address]  [phone]     Dec 06, 2023  9:45 AM  (Arrive by 9:30 AM)  Return Visit with Leonie Og DPM  [facility] ([facility] ) [address] [facility] [address]  [phone]             Routing refill request to provider for review/approval because:

## 2023-11-14 NOTE — TELEPHONE ENCOUNTER
Call returned to Alma with [facility] stating a VO is ok as long as Dr. Mendieta sends an order to [facility] for Alma to drop off the sample after collection.     Alma will also send an order for signature as well.     Lab Order pended.

## 2023-11-15 ENCOUNTER — MEDICAL CORRESPONDENCE (OUTPATIENT)
Dept: HEALTH INFORMATION MANAGEMENT | Facility: CLINIC | Age: 73
End: 2023-11-15
Payer: COMMERCIAL

## 2023-11-20 ENCOUNTER — APPOINTMENT (OUTPATIENT)
Dept: LAB | Facility: HOSPITAL | Age: 73
End: 2023-11-20
Payer: COMMERCIAL

## 2023-11-20 DIAGNOSIS — E11.8 TYPE 2 DIABETES MELLITUS WITH COMPLICATION, WITHOUT LONG-TERM CURRENT USE OF INSULIN (H): Primary | ICD-10-CM

## 2023-11-20 LAB
EST. AVERAGE GLUCOSE BLD GHB EST-MCNC: 278 MG/DL
HBA1C MFR BLD: 11.3 %

## 2023-11-20 PROCEDURE — 83036 HEMOGLOBIN GLYCOSYLATED A1C: CPT | Mod: ZL | Performed by: INTERNAL MEDICINE

## 2023-11-21 ENCOUNTER — TELEPHONE (OUTPATIENT)
Dept: INTERNAL MEDICINE | Facility: OTHER | Age: 73
End: 2023-11-21

## 2023-11-21 ENCOUNTER — MYC MEDICAL ADVICE (OUTPATIENT)
Dept: INTERNAL MEDICINE | Facility: OTHER | Age: 73
End: 2023-11-21

## 2023-11-21 DIAGNOSIS — E11.9 TYPE 2 DIABETES, HBA1C GOAL < 7% (H): Primary | ICD-10-CM

## 2023-11-21 DIAGNOSIS — F10.11 H/O ETOH ABUSE: ICD-10-CM

## 2023-11-21 DIAGNOSIS — E66.01 MORBID OBESITY (H): ICD-10-CM

## 2023-11-21 DIAGNOSIS — R26.9 ALTERED GAIT: ICD-10-CM

## 2023-11-21 DIAGNOSIS — Z53.9 PERSONS ENCOUNTERING HEALTH SERVICES FOR SPECIFIC PROCEDURES, NOT CARRIED OUT: ICD-10-CM

## 2023-11-21 DIAGNOSIS — I10 BENIGN ESSENTIAL HYPERTENSION: ICD-10-CM

## 2023-11-21 NOTE — PROGRESS NOTES
[facility] HEART CARE   CARDIOLOGY PROGRESS NOTE     Chief Complaint   Patient presents with    Follow Up    Heart Problem          Diagnosis:  1. TAAA.   -4.4 CM on 11/22/23  -4.4 CM on 5/20/22.   -4.5 CM on 5/3/21.   -4.45 cm on 1/7/19.   -4.39 cm on 1/13/20.  2.  HCM.   -1.3 cm on 5/20/22.  -2.2 cm on 1/7/19.   -2.0 cm on 1/13/20.   -1.3 CM on 5/20/22  3.  H/O tobacco abuse.   -Cigars quitting in April, 2018.    -He smoked approximate 7 days cigars on a daily basis.  4.  CHF diastolic.   -Indeterminate on 11/22/2023.   -Not assessable on 5/20/2022.   -Grade 1 on 1/13/2020.  -Grade 1 on 1/7/19.  5.  Hypertension-controled.  6.  LG-2-cegopbutiyaq.  7.  CKD-3.  8.  H/O CVA in March, 2018 with what appears to be unsteady gait.  9.  Sedentary lifestyle.  10.  Morbid obesity      Assessment/Plan:    1.  Peripheral edema: Likely from diastolic dysfunction/CHF.  Potentially from amlodipine.  Left more swollen than right.  No DVT on 4/29/2023.  Has been going on longer than when assess for DVT. Patient does have a history of diastolic grade 1 dating back to 2020.  On his most recent echocardiogram, they were not able to definitively say whether or not he has diastolic dysfunction.  Mention salt restriction, fluid restriction, and daily weights.  Also, discussed activity.  Likely from elevated blood pressures, diabetes, obesity, and sedentary lifestyle.  Stop hydrochlorothiazide 25 mg daily.  Start Lasix 20 mg daily.  Also, start Jardiance 10 mg daily.  2.  Ascending aortic aneurysm: Stable at 4.4 cm today.  Previously at 4.4 cm on 5/20/2022.  Was 4.39 cm on 1/13/2020.  Findings discussed.  Patient relieved.  I doubt this will be an issue for him.  3.  Hypertension: Uncontrolled.  Stop hydrochlorothiazide 25 mg daily and start on Lasix 20 mg daily.  Continue amlodipine 5 mg daily, losartan 40 mg daily, metoprolol 50 mg twice a day, and tamsulosin 0.8 mg daily.  4.  Diabetes type 2: Uncontrolled.  Significant spike recently in  A1c.  Start Jardiance 10 mg daily for diabetes and heart failure.  5.  Follow-up 1 year or sooner with issues.      Interval history:  Above.      HPI:    He is known to have HCM with a septal thickness of 2.2 cm on 1/7/19, an ascending aortic aneurysm at 44.5 mm, and diastolic dysfunction grade 1.      He previously had an echo on 6/20/18 with a septal thickness of 2.0 cm and an TAAA of 4.4 cm.       On 1/7/19, his septum was 2.2 cm, had grade 1 diastolic dysfunction, and ascending aortic aneurysm at 44.5 cm.  He has a history of a CVA with ongoing unsteadiness of gait but no dizziness in March, 2018. He is to be a heavy smoker smoking up to 7-8  cigars on a daily basis. He reports he quit smoking in April, 2018. He has hypertension with a blood pressure of 171/91 today.  At home, his blood pressure is running in the 120's/80's regularly.  He is currently on lisinopril 40 mg daily and metoprolol 50 mg twice a day.  He has not had any lower extremity edema or chest pain.  With his history of diastolic dysfunction, it has been suggested he watch his salt intake, fluid intake, and weigh himself on a daily basis.      Relevant testing:  Echocardiogram on 11/22/2023:  Technically difficult study.Extremely poor acoustic windows.  Limited information was obtained during study.  Global and regional left ventricular function is normal with an EF of 60-65%.  Right ventricular function, chamber size, wall motion, and thickness are  normal.  Pulmonary artery systolic pressure cannot be assessed.  Sinuses of Valsalva 4.3 cm.  Ascending aorta 4.4 cm.  The inferior vena cava cannot be assessed.  No pericardial effusion is present.  No significant changes noted.    ECHO on 5/20/22:  Global and regional left ventricular function is normal with an EF of 60-65%.  Global right ventricular function is normal.  Aortic root and ascending aorta size both normal for the patient when indexed  to BSA.  Sinuses of Valsalva 4.4 cm.  Ascending  aorta 4.4 cm.  This study was compared with the study from 5/2021 .  No significant changes noted.    ECHO on 5/3/21:  Global and regional left ventricular function is normal with an EF of 60-65%.  The right ventricle is normal size.  Global right ventricular function is normal.  Mild to moderate left atrial enlargement is present.  Trace mitral insufficiency is present.  Ao valve leaflets not well visualized  Trace tricuspid insufficiency is present.  Ascending aorta 4.3 cm.  Aortic root 4.5 cm        ICD-10-CM    1. POWELL (dyspnea on exertion)  R06.09 CBC with platelets     Comprehensive metabolic panel     N terminal pro BNP outpatient     EKG 12-lead complete w/read - (Clinic Performed)     empagliflozin (JARDIANCE) 10 MG TABS tablet     furosemide (LASIX) 20 MG tablet      2. Stage 3a chronic kidney disease (H)  N18.31 CBC with platelets     Comprehensive metabolic panel     Hemoglobin A1c     EKG 12-lead complete w/read - (Clinic Performed)      3. Type 2 diabetes mellitus   E11.8 Comprehensive metabolic panel     Hemoglobin A1c     empagliflozin (JARDIANCE) 10 MG TABS tablet      4. Diastolic dysfunction grade 1 on 1/7/19  I51.89 Comprehensive metabolic panel     N terminal pro BNP outpatient     furosemide (LASIX) 20 MG tablet      5. Chronic fatigue  R53.82 TSH Reflex GH     Magnesium      6. Benign prostatic hyperplasia, unspecified whether lower urinary tract symptoms present  N40.0 Prostate spec antigen screen      7. Peripheral edema  R60.9 N terminal pro BNP outpatient     EKG 12-lead complete w/read - (Clinic Performed)     furosemide (LASIX) 20 MG tablet      8. Lipid screening  Z13.220 Lipid Profile      9. Type 2 diabetes mellitus with complication, without long-term current use of insulin (H)  E11.8 CANCELED: Hemoglobin A1c            Past Medical History:   Diagnosis Date    Altered gait 4/27/2018    Altered mental status - since approx 1/18 - delayed response, change in mentation 4/27/2018     Benign essential hypertension 4/27/2018    Chronic bilateral low back pain with sciatica 4/27/2018    Chronic fatigue 4/27/2018    Obesity 4/27/2018    Slurred speech x 3 months, CT neg ED [facility] 4/25/18 4/27/2018    Stroke (H)     Type 2 diabetes mellitus with complication, without long-term current use of insulin (H) 4/27/2018    Vision changes x 3 months as of 4/18 4/27/2018       No past surgical history on file.    No Known Allergies    Current Outpatient Medications   Medication Sig Dispense Refill    amLODIPine (NORVASC) 5 MG tablet Take 1 tablet (5 mg) by mouth daily 90 tablet 3    aspirin (ASA) 81 MG chewable tablet CHEW AND SWALLOW 1 TABLET(81 MG) BY MOUTH DAILY 90 tablet 3    atorvastatin (LIPITOR) 40 MG tablet Take 1 tablet (40 mg) by mouth daily 90 tablet 3    Cholecalciferol (VITAMIN D-3) 1000 units CAPS Take 1,000 Units by mouth 2 times daily       CINNAMON PO Take 1,000 mg by mouth daily       empagliflozin (JARDIANCE) 10 MG TABS tablet Take 1 tablet (10 mg) by mouth daily 90 tablet 3    furosemide (LASIX) 20 MG tablet Take 1 tablet (20 mg) by mouth daily 90 tablet 3    gabapentin (NEURONTIN) 300 MG capsule TAKE 1 CAPSULE(300 MG) BY MOUTH THREE TIMES DAILY 90 capsule 0    Garlic 1000 MG CAPS Take by mouth daily       lisinopril (ZESTRIL) 40 MG tablet Take 1 tablet (40 mg) by mouth daily 90 tablet 3    metFORMIN (GLUCOPHAGE XR) 500 MG 24 hr tablet Take 2 tablets (1,000 mg) by mouth 2 times daily (with meals) 360 tablet 3    metoprolol tartrate (LOPRESSOR) 50 MG tablet TAKE 1 TABLET(50 MG) BY MOUTH TWICE DAILY 180 tablet 3    order for DME Equipment being ordered: Stationary Bike 1 Device 0    tamsulosin (FLOMAX) 0.4 MG capsule TAKE 2 CAPSULES(0.8 MG) BY MOUTH DAILY 180 capsule 0    TURMERIC CURCUMIN PO Take 1,500 mg by mouth daily          Social History     Socioeconomic History    Marital status: Single     Spouse name: Not on file    Number of children: Not on file    Years of education: Not on  file    Highest education level: Not on file   Occupational History    Not on file   Tobacco Use    Smoking status: Former     Packs/day: .5     Types: Cigars, Cigarettes     Start date: 2003     Quit date: 2018     Years since quittin.4    Smokeless tobacco: Former     Quit date: 2021   Substance and Sexual Activity    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 Yes   Social History Narrative    Not on file     Social Determinants of Health     Financial Resource Strain: Not on file   Food Insecurity: Not on file   Transportation Needs: Not on file   Physical Activity: Not on file   Stress: Not on file   Social Connections: Not on file   Interpersonal Safety: Not on file   Housing Stability: Not on file       LAB RESULTS:   No visits with results within 2 Month(s) from this visit.   Latest known visit with results is:   Office Visit on 2021   Component Date Value Ref Range Status    Cholesterol 2021 104  <200 mg/dL Final    Triglycerides 2021 150* <150 mg/dL Final    HDL Cholesterol 2021 43  >39 mg/dL Final    LDL Cholesterol Calculated 2021 31  <100 mg/dL Final    Non HDL Cholesterol 2021 61  <130 mg/dL Final    Sodium 2021 136  133 - 144 mmol/L Final    Potassium 2021 4.9  3.4 - 5.3 mmol/L Final    Chloride 2021 107  94 - 109 mmol/L Final    Carbon Dioxide 2021 18* 20 - 32 mmol/L Final    Anion Gap 2021 11  3 - 14 mmol/L Final    Glucose 2021 184* 70 - 99 mg/dL Final    Urea Nitrogen 2021 35* 7 - 30 mg/dL Final    Creatinine 2021 1.76* 0.66 - 1.25 mg/dL Final    GFR Estimate 2021 38* >60 mL/min/[1.73_m2] Final    GFR Estimate If Black 2021 45* >60 mL/min/[1.73_m2] Final    Calcium 2021 10.1  8.5 - 10.1 mg/dL Final    Bilirubin Total 2021 0.6  0.2 - 1.3 mg/dL Final  "   Albumin 02/02/2021 4.0  3.4 - 5.0 g/dL Final    Protein Total 02/02/2021 7.4  6.8 - 8.8 g/dL Final    Alkaline Phosphatase 02/02/2021 85  40 - 150 U/L Final    ALT 02/02/2021 42  0 - 70 U/L Final    AST 02/02/2021 13  0 - 45 U/L Final    WBC 02/02/2021 8.9  4.0 - 11.0 10e9/L Final    RBC Count 02/02/2021 4.96  4.4 - 5.9 10e12/L Final    Hemoglobin 02/02/2021 15.8  13.3 - 17.7 g/dL Final    Hematocrit 02/02/2021 45.2  40.0 - 53.0 % Final    MCV 02/02/2021 91  78 - 100 fl Final    MCH 02/02/2021 31.9  26.5 - 33.0 pg Final    MCHC 02/02/2021 35.0  31.5 - 36.5 g/dL Final    RDW 02/02/2021 12.5  10.0 - 15.0 % Final    Platelet Count 02/02/2021 173  150 - 450 10e9/L Final    % Neutrophils 02/02/2021 65.2  % Final    % Lymphocytes 02/02/2021 26.0  % Final    % Monocytes 02/02/2021 6.8  % Final    % Eosinophils 02/02/2021 1.5  % Final    % Basophils 02/02/2021 0.5  % Final    Absolute Neutrophil 02/02/2021 5.8  1.6 - 8.3 10e9/L Final    Absolute Lymphocytes 02/02/2021 2.3  0.8 - 5.3 10e9/L Final    Absolute Monocytes 02/02/2021 0.6  0.0 - 1.3 10e9/L Final    Absolute Eosinophils 02/02/2021 0.1  0.0 - 0.7 10e9/L Final    Absolute Basophils 02/02/2021 0.0  0.0 - 0.2 10e9/L Final    Diff Method 02/02/2021 Automated Method   Final    Hemoglobin A1C 02/02/2021 7.4* 0 - 5.6 % Final    Estimated Average Glucose 02/02/2021 166  mg/dL Final        Review of systems: Negative except that which was noted in the HPI.    Physical examination:  Pulse 87   Resp 20   Ht 1.88 m (6' 2\")   Wt 146.1 kg (322 lb)   SpO2 97%   BMI 41.34 kg/m      GENERAL APPEARANCE: healthy, alert and no distress  CHEST: lungs clear to auscultation.  CARDIOVASCULAR: regular rhythm, normal S1 with physiologic split S2, no S3 or S4 and no murmur, click or rub  EXTREMITIES: no clubbing, cyanosis with mild to moderate peripheral edema.  Left worse than right..    Total time spent on day of visit, including review of tests, obtaining/reviewing separately " obtained history, ordering medications/tests/procedures, communicating with PCP/consultants, and documenting in electronic medical record: 33 minutes.           Thank you for allowing me to participate in the care of your patient. Please do not hesitate to contact me if you have any questions.     Chas Armendariz, DO

## 2023-11-21 NOTE — TELEPHONE ENCOUNTER
Call returned to [facility] with [facility] ok to continue with Home Care Services 1 x weekly x 4 weeks per Dr. Mendieta. Message left on secure voicemail.

## 2023-11-21 NOTE — TELEPHONE ENCOUNTER
"11/21/2023 3:52 PM  Writer called pt's regarding my chart message. See message. Glenis reports, \"my father has severe mobility issues and difficulty getting out of the house\".     Diabetic Referral placed.     Do you want a face to face as pt is having mobility issues? They have questions regarding labs and if you will be making med adjustments due to A1c. Lab note reviewed. They also have questions on services etc.  Pt has been scheduled in person to see you next week. Pt's daughter reports she will do her best to get patient in for face to face. Daughter stated \"we will see how tomorrow goes as he has an appointment with Cardiology.\"     Patient's daughter reported they will be getting a home visit from a nurse soon as well.     oR Man RN      Future Appointments 11/21/2023 - 5/19/2024        Date Visit Type Length Department Provider     11/22/2023  9:00 AM ECHO COMPLETE 60 min [facility] CARDIAC SERVICES [facility]    Location Instructions:     From [facility]: Take [address]. Turn left at [address]. Turn left at the first stoplight on [address]. At the first stop sign, take a right onto [address]. Take a left into the parking lot and continue through until you reach the North entrance of the building.   From [place]: Take [address]. Take the [address] ramp towards [place]. Turn left onto [address]. Take a slight right onto [address]. Turn left at the first stoplight on [address]. At the first stop sign, take a right onto [address]. Take a left into the parking lot and continue through until you reach the North entrance of the building.   From [place]: Take [address]. Take a right at [address]. At the first stop sign, take a right onto [address]. Take a left into the parking lot and continue through until you reach the North entrance of the building.              11/22/2023 10:30 AM LAB 15 min HC LABORATORY [facility] "    Location Instructions:     From [facility]: Take [address]. Turn left at [address] [facility] [address]. Turn left at the first stoplight on [address]. At the first stop sign, take a right onto [address]. The upper level parking lot will be on the left. East Entrance Door number 10.   From [place]: Take [address]. Take a right at [address]. At the first stop sign, take a right onto [address]. The upper level parking lot will be on the left. East Entrance Door number 10.               11/22/2023 10:30 AM RETURN 30 min  CARDIOLOGY Chas Armendariz, DO    Location Instructions:     From [facility]: Take [address]. Turn left at [address] [facility] [address]. Turn left at the first stoplight on [address]. At the first stop sign, take a right onto [address]. The upper level parking lot will be on the left. East Entrance Door number 10.   From [place]: Take [address]. Take a right at [address]. At the first stop sign, take a right onto [address]. The upper level parking lot will be on the left. East Entrance Door number 10.   From [place]: Take [address]. Take the [address] ramp towards [place]. Turn left onto [address]. Take a slight right onto [address]. Turn left at the first stoplight on [address]. At the first stop sign, take a right onto [address]. The upper level parking lot will be on the left. East Entrance Door number 10.              11/28/2023  3:30 PM SHORT 30 min [facility] INTERNAL MEDICINE Sreedhar Mendieta, DO    Location Instructions:     From [place] - follow [address]nbsp; Take a right at the intersection across from [employer].&nbsp; The clinic will be on your right.  From [place] - follow [address]nbsp; Take a right onto [address].&nbsp; Take a left at the intersection across from [employer].&nbsp; The clinic will be on your right.  From [place] -  follow [address]nbsp; Take a left onto [address].&nbsp; Take a left at the intersection across from [employer].&nbsp; The clinic will be on your right.              12/6/2023  9:45 AM RETURN 15 min [facility] PODIATRY Leonie Og DPM    Location Instructions:     From [place] - follow [address]nbsp; Take a right at the intersection across from [employer].&nbsp; The clinic will be on your right.  From [place] - follow [address]nbsp; Take a right onto [address].&nbsp; Take a left at the intersection across from [employer].&nbsp; The clinic will be on your right.  From [place] - follow [address]nbsp; Take a left onto [address].&nbsp; Take a left at the intersection across from [employer].&nbsp; The clinic will be on your right.

## 2023-11-21 NOTE — TELEPHONE ENCOUNTER
8:07 AM    Reason for Call: Phone Call    Description:  Sita riojas/ [facility] called in asking for verbal orders for extension of services. 1 x weekly for 4 weeks.  Please call Sita back.     Was an appointment offered for this call? No  If yes : Appointment type              Date    Preferred method for responding to this message: Telephone Call  What is your phone number ? [phone]     If we cannot reach you directly, may we leave a detailed response at the number you provided? Yes - secure line if verbal authorization is approved    Can this message wait until your PCP/provider returns, if available today? Not applicable    Florencia Zaidi

## 2023-11-22 ENCOUNTER — APPOINTMENT (OUTPATIENT)
Dept: LAB | Facility: OTHER | Age: 73
End: 2023-11-22
Payer: COMMERCIAL

## 2023-11-22 ENCOUNTER — OFFICE VISIT (OUTPATIENT)
Dept: CARDIOLOGY | Facility: OTHER | Age: 73
End: 2023-11-22
Attending: INTERNAL MEDICINE
Payer: COMMERCIAL

## 2023-11-22 ENCOUNTER — HOSPITAL ENCOUNTER (OUTPATIENT)
Dept: CARDIOLOGY | Facility: HOSPITAL | Age: 73
Discharge: HOME | End: 2023-11-22
Attending: INTERNAL MEDICINE
Payer: COMMERCIAL

## 2023-11-22 VITALS
RESPIRATION RATE: 20 BRPM | WEIGHT: 315 LBS | HEIGHT: 74 IN | HEART RATE: 87 BPM | BODY MASS INDEX: 40.43 KG/M2 | OXYGEN SATURATION: 97 %

## 2023-11-22 DIAGNOSIS — R06.09 DOE (DYSPNEA ON EXERTION): ICD-10-CM

## 2023-11-22 DIAGNOSIS — E11.8 TYPE 2 DIABETES MELLITUS WITH COMPLICATION, WITHOUT LONG-TERM CURRENT USE OF INSULIN (H): ICD-10-CM

## 2023-11-22 DIAGNOSIS — R60.0 PERIPHERAL EDEMA: ICD-10-CM

## 2023-11-22 DIAGNOSIS — Z13.220 LIPID SCREENING: ICD-10-CM

## 2023-11-22 DIAGNOSIS — N40.0 BENIGN PROSTATIC HYPERPLASIA, UNSPECIFIED WHETHER LOWER URINARY TRACT SYMPTOMS PRESENT: ICD-10-CM

## 2023-11-22 DIAGNOSIS — I51.89 DIASTOLIC DYSFUNCTION: ICD-10-CM

## 2023-11-22 DIAGNOSIS — N18.31 STAGE 3A CHRONIC KIDNEY DISEASE (H): ICD-10-CM

## 2023-11-22 DIAGNOSIS — R53.82 CHRONIC FATIGUE: ICD-10-CM

## 2023-11-22 LAB
ALBUMIN SERPL BCG-MCNC: 4.4 G/DL (ref 3.5–5.2)
ALP SERPL-CCNC: 112 U/L (ref 40–150)
ALT SERPL W P-5'-P-CCNC: 32 U/L (ref 0–70)
ANION GAP SERPL CALCULATED.3IONS-SCNC: 15 MMOL/L (ref 7–15)
AST SERPL W P-5'-P-CCNC: 20 U/L (ref 0–45)
BILIRUB SERPL-MCNC: 0.4 MG/DL
BUN SERPL-MCNC: 26.8 MG/DL (ref 8–23)
CALCIUM SERPL-MCNC: 10.9 MG/DL (ref 8.8–10.2)
CHLORIDE SERPL-SCNC: 99 MMOL/L (ref 98–107)
CHOLEST SERPL-MCNC: 115 MG/DL
CREAT SERPL-MCNC: 1.43 MG/DL (ref 0.67–1.17)
DEPRECATED HCO3 PLAS-SCNC: 23 MMOL/L (ref 22–29)
EGFRCR SERPLBLD CKD-EPI 2021: 52 ML/MIN/1.73M2
ERYTHROCYTE [DISTWIDTH] IN BLOOD BY AUTOMATED COUNT: 12.5 % (ref 10–15)
EST. AVERAGE GLUCOSE BLD GHB EST-MCNC: 266 MG/DL
GLUCOSE SERPL-MCNC: 321 MG/DL (ref 70–99)
HBA1C MFR BLD: 10.9 %
HCT VFR BLD AUTO: 48.1 % (ref 40–53)
HDLC SERPL-MCNC: 43 MG/DL
HGB BLD-MCNC: 16.8 G/DL (ref 13.3–17.7)
LDLC SERPL CALC-MCNC: 32 MG/DL
LVEF ECHO: NORMAL
MAGNESIUM SERPL-MCNC: 1.8 MG/DL (ref 1.7–2.3)
MCH RBC QN AUTO: 31.9 PG (ref 26.5–33)
MCHC RBC AUTO-ENTMCNC: 34.9 G/DL (ref 31.5–36.5)
MCV RBC AUTO: 91 FL (ref 78–100)
NONHDLC SERPL-MCNC: 72 MG/DL
NT-PROBNP SERPL-MCNC: 273 PG/ML (ref 0–900)
PLATELET # BLD AUTO: 182 10E3/UL (ref 150–450)
POTASSIUM SERPL-SCNC: 4.7 MMOL/L (ref 3.4–5.3)
PROT SERPL-MCNC: 7.2 G/DL (ref 6.4–8.3)
PSA SERPL DL<=0.01 NG/ML-MCNC: 0.89 NG/ML (ref 0–6.5)
RBC # BLD AUTO: 5.27 10E6/UL (ref 4.4–5.9)
SODIUM SERPL-SCNC: 137 MMOL/L (ref 135–145)
TRIGL SERPL-MCNC: 201 MG/DL
TSH SERPL DL<=0.005 MIU/L-ACNC: 1.65 UIU/ML (ref 0.3–4.2)
WBC # BLD AUTO: 7.7 10E3/UL (ref 4–11)

## 2023-11-22 PROCEDURE — 80061 LIPID PANEL: CPT | Mod: ZL | Performed by: INTERNAL MEDICINE

## 2023-11-22 PROCEDURE — 80053 COMPREHEN METABOLIC PANEL: CPT | Mod: ZL | Performed by: INTERNAL MEDICINE

## 2023-11-22 PROCEDURE — 93000 ELECTROCARDIOGRAM COMPLETE: CPT | Mod: 77 | Performed by: INTERNAL MEDICINE

## 2023-11-22 PROCEDURE — 93306 TTE W/DOPPLER COMPLETE: CPT | Mod: 26 | Performed by: INTERNAL MEDICINE

## 2023-11-22 PROCEDURE — 84443 ASSAY THYROID STIM HORMONE: CPT | Mod: ZL | Performed by: INTERNAL MEDICINE

## 2023-11-22 PROCEDURE — 99214 OFFICE O/P EST MOD 30 MIN: CPT | Performed by: INTERNAL MEDICINE

## 2023-11-22 PROCEDURE — 93306 TTE W/DOPPLER COMPLETE: CPT

## 2023-11-22 PROCEDURE — 85027 COMPLETE CBC AUTOMATED: CPT | Mod: ZL | Performed by: INTERNAL MEDICINE

## 2023-11-22 PROCEDURE — 36415 COLL VENOUS BLD VENIPUNCTURE: CPT | Mod: ZL | Performed by: INTERNAL MEDICINE

## 2023-11-22 PROCEDURE — G0463 HOSPITAL OUTPT CLINIC VISIT: HCPCS | Mod: 25

## 2023-11-22 PROCEDURE — 83880 ASSAY OF NATRIURETIC PEPTIDE: CPT | Mod: ZL | Performed by: INTERNAL MEDICINE

## 2023-11-22 PROCEDURE — G0103 PSA SCREENING: HCPCS | Mod: ZL | Performed by: INTERNAL MEDICINE

## 2023-11-22 PROCEDURE — 93005 ELECTROCARDIOGRAM TRACING: CPT | Performed by: INTERNAL MEDICINE

## 2023-11-22 PROCEDURE — 83735 ASSAY OF MAGNESIUM: CPT | Mod: ZL | Performed by: INTERNAL MEDICINE

## 2023-11-22 PROCEDURE — 83036 HEMOGLOBIN GLYCOSYLATED A1C: CPT | Mod: ZL | Performed by: INTERNAL MEDICINE

## 2023-11-22 RX ORDER — FUROSEMIDE 20 MG
20 TABLET ORAL DAILY
Qty: 90 TABLET | Refills: 3 | Status: SHIPPED | OUTPATIENT
Start: 2023-11-22

## 2023-11-22 ASSESSMENT — PAIN SCALES - GENERAL: PAINLEVEL: NO PAIN (0)

## 2023-11-22 NOTE — PATIENT INSTRUCTIONS
Thank you for allowing Dr DAGO Armendariz and our  team to participate in your care. Please call our office at [phone] with scheduling questions or if you need to cancel or change your appointment. With any other questions or concerns you may call cardiology nurse at  [phone].       If you experience chest pain, chest pressure, chest tightness, shortness of breath, fainting, lightheadedness, nausea, vomiting, or other concerning symptoms, please report to the Emergency Department or call 911. These symptoms may be emergent, and best treated in the Emergency Department.

## 2023-11-22 NOTE — CONFIDENTIAL NOTE
Nothing that I can think of as far as tests off hand.  Even if there were unlikely to get him in today for anything.  Diabetic Education would be very important for him, but uncertain as to whether that will even be an option moving forward.      As far as medications, we are reaching the end as to oral medications options-but curious if Dr. Armendariz might start Jardiance today which would be both for the heart and the diabetes and is a pill.  However, again that may not be enough and we will have to look at additional options.    Unfortunately, I think we need to look at the bigger picture as he seemingly will need more and more care moving forward.        Regards-  Oz

## 2023-11-26 LAB
ATRIAL RATE - MUSE: 68 BPM
DIASTOLIC BLOOD PRESSURE - MUSE: NORMAL MMHG
INTERPRETATION ECG - MUSE: NORMAL
P AXIS - MUSE: 33 DEGREES
PR INTERVAL - MUSE: 286 MS
QRS DURATION - MUSE: 110 MS
QT - MUSE: 396 MS
QTC - MUSE: 421 MS
R AXIS - MUSE: -62 DEGREES
SYSTOLIC BLOOD PRESSURE - MUSE: NORMAL MMHG
T AXIS - MUSE: 55 DEGREES
VENTRICULAR RATE- MUSE: 68 BPM

## 2023-11-28 ENCOUNTER — OFFICE VISIT (OUTPATIENT)
Dept: INTERNAL MEDICINE | Facility: OTHER | Age: 73
End: 2023-11-28
Attending: INTERNAL MEDICINE
Payer: COMMERCIAL

## 2023-11-28 ENCOUNTER — TELEPHONE (OUTPATIENT)
Dept: INTERNAL MEDICINE | Facility: OTHER | Age: 73
End: 2023-11-28

## 2023-11-28 VITALS
HEART RATE: 72 BPM | DIASTOLIC BLOOD PRESSURE: 78 MMHG | RESPIRATION RATE: 20 BRPM | SYSTOLIC BLOOD PRESSURE: 128 MMHG | BODY MASS INDEX: 40.44 KG/M2 | OXYGEN SATURATION: 97 % | WEIGHT: 315 LBS | TEMPERATURE: 97.3 F

## 2023-11-28 DIAGNOSIS — Z23 NEED FOR INFLUENZA VACCINATION: ICD-10-CM

## 2023-11-28 DIAGNOSIS — I51.89 DIASTOLIC DYSFUNCTION: Primary | ICD-10-CM

## 2023-11-28 DIAGNOSIS — N18.31 STAGE 3A CHRONIC KIDNEY DISEASE (H): ICD-10-CM

## 2023-11-28 DIAGNOSIS — E11.65 POORLY CONTROLLED TYPE 2 DIABETES MELLITUS (H): ICD-10-CM

## 2023-11-28 DIAGNOSIS — Z86.73 HISTORY OF CVA (CEREBROVASCULAR ACCIDENT): ICD-10-CM

## 2023-11-28 PROCEDURE — G0008 ADMIN INFLUENZA VIRUS VAC: HCPCS

## 2023-11-28 PROCEDURE — 99215 OFFICE O/P EST HI 40 MIN: CPT | Performed by: INTERNAL MEDICINE

## 2023-11-28 PROCEDURE — G0463 HOSPITAL OUTPT CLINIC VISIT: HCPCS | Mod: 25

## 2023-11-28 ASSESSMENT — PAIN SCALES - GENERAL: PAINLEVEL: NO PAIN (0)

## 2023-11-28 NOTE — TELEPHONE ENCOUNTER
Call returned to provide update ok for verbal orders as per below per Dr. Mendieta.     Sreedhar Mendieta, DO5 hours ago (9:17 AM)     AB  Ok for verbal

## 2023-11-28 NOTE — TELEPHONE ENCOUNTER
8:03 AM    Reason for Call: Phone Call    Description: Lauren riojas/ [facility] called asking for verbal orders for an extension of home health physical therapy: 1x week for 2 weeks. Effective 12-11-23.     Was an appointment offered for this call? No  If yes : Appointment type              Date    Preferred method for responding to this message: Telephone Call  What is your phone number ? [phone]     If we cannot reach you directly, may we leave a detailed response at the number you provided? Yes - secure and direct line    Can this message wait until your PCP/provider returns, if available today? Not applicable, PROVIDER IN CLINIC    Florencia Zaidi

## 2023-11-28 NOTE — PROGRESS NOTES
"  Assessment & Plan   Problem List Items Addressed This Visit          Circulatory    Diastolic dysfunction grade 1 on 1/7/19 - Primary    Relevant Orders    Home Care Referral       Urinary    Stage 3a chronic kidney disease (H)    Relevant Orders    Home Care Referral       Other    History of CVA 3/18    Relevant Orders    Home Care Referral     Other Visit Diagnoses       Poorly controlled type 2 diabetes mellitus (H)        Relevant Medications    Continuous Blood Gluc Sensor (FREESTYLE LILLIANA 2 SENSOR) MISC    Continuous Blood Gluc  (FREESTYLE LILLIANA 2 READER) BRIANDA    Other Relevant Orders    Basic metabolic panel    Home Care Referral    Need for influenza vaccination        Relevant Orders    INFLUENZA VACCINE 65+ (FLUZONE HD) (Completed)               40 minutes spent by me on the date of the encounter doing chart review, review of test results, interpretation of tests, patient visit, documentation, and discussion with family        BMI:   Estimated body mass index is 40.44 kg/m  as calculated from the following:    Height as of 11/22/23: 1.88 m (6' 2\").    Weight as of this encounter: 142.9 kg (315 lb).       Follow-up in 3 months.    Sreedhar Mendieta, [facility]   Mendoza is a 73 year old, presenting for the following health issues:  RECHECK      HPI   Mendoza presents today accompanied by his daughter and significant other.  His history is remarkable for hypertension cardiomyopathy with diastolic heart failure ascending aortic aneurysm, poorly controlled type 2 diabetes mellitus with most recent A1c around 11, chronic kidney disease stage III, history of CVA and obesity.    Mendoza has been lost to follow-up for approximately a year however he was recently seen by cardiology.  With this visit his hydrochlorothiazide was stopped and he was started on Lasix daily.  He remains on metoprolol 50 mg twice daily along with losartan 40 mg daily and amlodipine 5 mg " daily.  In regard to his underlying diabetes and cardiac status he was started on Jardiance 10 mg daily.    He previously was receiving some home nursing however that was discontinued nevertheless he continues with home PT which has benefited him greatly.  This order was renewed today prior to the appointment.  Patient is getting around with ambulating and assistance with a walker.  No recent falls have been reported however he has had falls.  He generally resides in the basement of his house a vast majority of the time.  Family did request repeat order for home nursing.  In addition he has significant edema in his lower extremity.  Ultrasound studies were negative for DVT nevertheless edema does persist.  We did have a long conversation today as to the fact that further investigation could possibly be pursued with a CT vascular study however this would require contrast and then in the context of his underlying kidney disease would pose some risk.  For now patient and family are aware of this option however they do understand that this is not without risk.  He continues to receive massage therapy to his lower extremities for his edema.  In addition he will be seeing podiatry for the dry skin and thick nails on both his feet bilaterally.    In regard to his poorly controlled diabetes as stated he was started on Jardiance and continues on metformin which is borderline questionable for him due to his creatinine.  Nevertheless we will follow-up his A1c in approximately 3 months at which time we will determine if any further interventions are needed.  In the meantime we did discuss Accu-Cheks however he is adamantly against this and admits that he would not check his blood sugar in this manner.  We also discussed freestyle bessy for which he could use his smart phone.  Patient is tech savvy and was agreeable to trying this however I informed him that this likely is not going to be covered.  Regardless it would carry a  cost of about $75 a month.    Patient will be seen podiatry in 1 week or so at which time he will have a basic metabolic panel obtained due to his new Lasix use.  From there repeat A1c in about 3 months.  Home nursing has been requested again along with continuation of home PT.  In addition prescription was written for bessy however uncertain whether or not there will be any coverage for this and patient would likely have to pay out-of-pocket but declines any Accu-Chek devices.  Patient will obtain the flu shot today.      Discuss Labs- Mobility Concerns         Review of Systems   Constitutional, HEENT, cardiovascular, pulmonary, gi and gu systems are negative, except as otherwise noted.      Objective    /78 (BP Location: Left arm, Patient Position: Sitting, Cuff Size: Adult Large)   Pulse 72   Temp 97.3  F (36.3  C) (Tympanic)   Resp 20   Wt 142.9 kg (315 lb)   SpO2 97%   BMI 40.44 kg/m    Body mass index is 40.44 kg/m .  Physical Exam   GENERAL: alert and no distress  EYES: Eyes grossly normal to inspection, PERRL and conjunctivae and sclerae normal  RESP: lungs clear to auscultation - no rales, rhonchi or wheezes  CV: regular rate and rhythm, normal S1 S2, no S3 or S4, no murmur, click or rub, no peripheral edema and peripheral pulses strong  ABDOMEN: Obese soft.  MS: Swollen right lower extremity edema with +3-4 left lower extremity edema.  SKIN: Dry skin and thickened long toenails on feet bilaterally.  Feet with calluses bilaterally mostly on the plantar aspects.  NEURO: Filament testing did reveal sensation being intact in both lower extremities bilaterally.  PSYCH: mentation appears normal, affect normal/[facility] Visit on 11/22/2023   Component Date Value Ref Range Status    LVEF  11/22/2023 60-65%   Final     No results found for any visits on 11/28/23.  No results found for this or any previous visit (from the past 24 hour(s)).

## 2023-11-28 NOTE — TELEPHONE ENCOUNTER
Patient has appt scheduled with Dr. Mendieta.     Next 5 appointments (look out 90 days)      Nov 28, 2023  3:30 PM  (Arrive by 3:15 PM)  Office Visit with Sreedhar Mendieta DO  [facility] ([facility] ) [address] [address]  [phone]     Dec 06, 2023  9:45 AM  (Arrive by 9:30 AM)  Return Visit with Leonie Og DPM  [facility] ([facility] ) [address] [address]  [phone]

## 2023-11-29 ENCOUNTER — DOCUMENTATION ONLY (OUTPATIENT)
Dept: CARE COORDINATION | Facility: CLINIC | Age: 73
End: 2023-11-29
Payer: COMMERCIAL

## 2023-11-29 DIAGNOSIS — Z86.73 HISTORY OF CVA (CEREBROVASCULAR ACCIDENT): ICD-10-CM

## 2023-11-29 DIAGNOSIS — E11.65 POORLY CONTROLLED TYPE 2 DIABETES MELLITUS (H): Primary | ICD-10-CM

## 2023-11-29 DIAGNOSIS — E66.01 MORBID OBESITY (H): ICD-10-CM

## 2023-11-29 NOTE — PROGRESS NOTES
Received referral for Skilled nursing.  Client is active with [facility] for home care.  Please send order for skilled nursing to them as client is unable to receive home care from two different agencies.  Thank you

## 2023-11-30 ENCOUNTER — MEDICAL CORRESPONDENCE (OUTPATIENT)
Dept: HEALTH INFORMATION MANAGEMENT | Facility: HOSPITAL | Age: 73
End: 2023-11-30

## 2023-12-01 ENCOUNTER — TELEPHONE (OUTPATIENT)
Dept: INTERNAL MEDICINE | Facility: OTHER | Age: 73
End: 2023-12-01

## 2023-12-01 DIAGNOSIS — E11.8 TYPE 2 DIABETES MELLITUS WITH COMPLICATION, WITHOUT LONG-TERM CURRENT USE OF INSULIN (H): Primary | ICD-10-CM

## 2023-12-05 ENCOUNTER — PATIENT OUTREACH (OUTPATIENT)
Dept: CARE COORDINATION | Facility: CLINIC | Age: 73
End: 2023-12-05
Payer: COMMERCIAL

## 2023-12-06 ENCOUNTER — OFFICE VISIT (OUTPATIENT)
Dept: PODIATRY | Facility: OTHER | Age: 73
End: 2023-12-06
Attending: PODIATRIST
Payer: COMMERCIAL

## 2023-12-06 ENCOUNTER — TELEPHONE (OUTPATIENT)
Dept: FAMILY MEDICINE | Facility: OTHER | Age: 73
End: 2023-12-06

## 2023-12-06 VITALS
TEMPERATURE: 96.2 F | DIASTOLIC BLOOD PRESSURE: 75 MMHG | HEART RATE: 66 BPM | OXYGEN SATURATION: 98 % | SYSTOLIC BLOOD PRESSURE: 135 MMHG

## 2023-12-06 DIAGNOSIS — N18.32 STAGE 3B CHRONIC KIDNEY DISEASE (H): ICD-10-CM

## 2023-12-06 DIAGNOSIS — E11.42 DIABETIC POLYNEUROPATHY ASSOCIATED WITH TYPE 2 DIABETES MELLITUS (H): ICD-10-CM

## 2023-12-06 DIAGNOSIS — Z13.89 SCREENING FOR DIABETIC PERIPHERAL NEUROPATHY: ICD-10-CM

## 2023-12-06 DIAGNOSIS — E11.65 POORLY CONTROLLED TYPE 2 DIABETES MELLITUS (H): ICD-10-CM

## 2023-12-06 DIAGNOSIS — E11.9 DIABETES MELLITUS TYPE 2, NONINSULIN DEPENDENT (H): ICD-10-CM

## 2023-12-06 DIAGNOSIS — L60.3 ONYCHODYSTROPHY: Primary | ICD-10-CM

## 2023-12-06 LAB
ANION GAP SERPL CALCULATED.3IONS-SCNC: 17 MMOL/L (ref 7–15)
BUN SERPL-MCNC: 46.8 MG/DL (ref 8–23)
CALCIUM SERPL-MCNC: 10.7 MG/DL (ref 8.8–10.2)
CHLORIDE SERPL-SCNC: 99 MMOL/L (ref 98–107)
CREAT SERPL-MCNC: 2.02 MG/DL (ref 0.67–1.17)
DEPRECATED HCO3 PLAS-SCNC: 19 MMOL/L (ref 22–29)
EGFRCR SERPLBLD CKD-EPI 2021: 34 ML/MIN/1.73M2
GLUCOSE SERPL-MCNC: 250 MG/DL (ref 70–99)
HOLD SPECIMEN: NORMAL
POTASSIUM SERPL-SCNC: 4.9 MMOL/L (ref 3.4–5.3)
SODIUM SERPL-SCNC: 135 MMOL/L (ref 135–145)

## 2023-12-06 PROCEDURE — 99207 PR FOOT EXAM NO CHARGE: CPT | Performed by: PODIATRIST

## 2023-12-06 PROCEDURE — 80048 BASIC METABOLIC PNL TOTAL CA: CPT | Mod: ZL | Performed by: PODIATRIST

## 2023-12-06 PROCEDURE — G0463 HOSPITAL OUTPT CLINIC VISIT: HCPCS

## 2023-12-06 PROCEDURE — 36415 COLL VENOUS BLD VENIPUNCTURE: CPT | Mod: ZL | Performed by: PODIATRIST

## 2023-12-06 PROCEDURE — 11721 DEBRIDE NAIL 6 OR MORE: CPT | Performed by: PODIATRIST

## 2023-12-06 ASSESSMENT — PAIN SCALES - GENERAL: PAINLEVEL: NO PAIN (0)

## 2023-12-06 NOTE — TELEPHONE ENCOUNTER
Call placed to [facility] requesting for Home Care Nurse to provide patient with education on freestyle bessy during SNV per Temi Millan CNP.     Nurse will provide education during home care visit.

## 2023-12-07 DIAGNOSIS — R94.4 ABNORMAL RENAL FUNCTION TEST: Primary | ICD-10-CM

## 2023-12-08 NOTE — PROGRESS NOTES
Sreedhar Mendieta P, DOYesterday (8:27 AM)     AB  His Cr is up a bit from baseline-I would like it checked again on 12/11.      Call placed to patient, notified of lab results and request for recheck on Creatinine on 12/11/23 per Dr. Mendieta. Patient notified RN CC [facility] to be drawn by RN in the home.     Patient verbalized understanding.

## 2023-12-08 NOTE — PROGRESS NOTES
Call placed to [facility] requesting SNV for lab draw on 12/11/23 per Dr. Mendieta.     Spoke with Emmy, RN Case Manager, notified ok for verbal prn order for lab draw for creatinine on 12/11/23 per Dr. Mendieta.

## 2023-12-10 DIAGNOSIS — G62.9 NEUROPATHY: ICD-10-CM

## 2023-12-11 ENCOUNTER — MEDICAL CORRESPONDENCE (OUTPATIENT)
Dept: HEALTH INFORMATION MANAGEMENT | Facility: CLINIC | Age: 73
End: 2023-12-11
Payer: COMMERCIAL

## 2023-12-11 ENCOUNTER — LAB REQUISITION (OUTPATIENT)
Dept: LAB | Facility: HOSPITAL | Age: 73
End: 2023-12-11
Payer: COMMERCIAL

## 2023-12-11 LAB
CREAT SERPL-MCNC: 2.14 MG/DL (ref 0.67–1.17)
EGFRCR SERPLBLD CKD-EPI 2021: 32 ML/MIN/1.73M2

## 2023-12-11 PROCEDURE — 82565 ASSAY OF CREATININE: CPT | Performed by: INTERNAL MEDICINE

## 2023-12-11 NOTE — TELEPHONE ENCOUNTER
NEURONTIN      Last Written Prescription Date:  11-9-23  Last Fill Quantity: 90,   # refills: 0  Last Office Visit: 11-28-23  Future Office visit:       Routing refill request to provider for review/approval because:  Drug not on the [facility], P or [facility] refill protocol or controlled substance

## 2023-12-12 RX ORDER — GABAPENTIN 300 MG/1
300 CAPSULE ORAL 3 TIMES DAILY
Qty: 90 CAPSULE | Refills: 0 | Status: SHIPPED | OUTPATIENT
Start: 2023-12-12 | End: 2024-01-09

## 2023-12-13 DIAGNOSIS — N18.30 STAGE 3 CHRONIC KIDNEY DISEASE, UNSPECIFIED WHETHER STAGE 3A OR 3B CKD (H): Primary | ICD-10-CM

## 2023-12-14 DIAGNOSIS — I71.21 ANEURYSM OF ASCENDING AORTA WITHOUT RUPTURE (H): ICD-10-CM

## 2023-12-14 DIAGNOSIS — R47.81 SLURRED SPEECH: ICD-10-CM

## 2023-12-14 DIAGNOSIS — E11.8 TYPE 2 DIABETES MELLITUS WITH COMPLICATION, WITHOUT LONG-TERM CURRENT USE OF INSULIN (H): Primary | ICD-10-CM

## 2023-12-14 DIAGNOSIS — E11.8 TYPE 2 DIABETES MELLITUS WITH COMPLICATION, WITHOUT LONG-TERM CURRENT USE OF INSULIN (H): ICD-10-CM

## 2023-12-14 DIAGNOSIS — Z86.73 HISTORY OF CVA (CEREBROVASCULAR ACCIDENT): ICD-10-CM

## 2023-12-14 DIAGNOSIS — E78.2 MIXED HYPERLIPIDEMIA: ICD-10-CM

## 2023-12-14 RX ORDER — ATORVASTATIN CALCIUM 40 MG/1
40 TABLET, FILM COATED ORAL DAILY
Qty: 90 TABLET | Refills: 3 | Status: SHIPPED | OUTPATIENT
Start: 2023-12-14

## 2023-12-14 NOTE — TELEPHONE ENCOUNTER
atorvastatin (LIPITOR) 40 MG tablet 90 tablet 3 4/17/2023     Last cardiology visit 05/23/2022    Future Office visit:       Routing refill request to provider for review/approval because:

## 2023-12-14 NOTE — TELEPHONE ENCOUNTER
METFORMIN      Last Written Prescription Date:  3-7-23  Last Fill Quantity: 360,   # refills: 3  Last Office Visit: 11-28-23  Future Office visit:       Routing refill request to provider for review/approval because:  Drug not on the [facility], P or [facility] refill protocol or controlled substance

## 2023-12-15 RX ORDER — METFORMIN HCL 500 MG
1000 TABLET, EXTENDED RELEASE 24 HR ORAL 2 TIMES DAILY WITH MEALS
Qty: 360 TABLET | Refills: 3 | OUTPATIENT
Start: 2023-12-15

## 2023-12-18 ENCOUNTER — TELEPHONE (OUTPATIENT)
Dept: INTERNAL MEDICINE | Facility: OTHER | Age: 73
End: 2023-12-18

## 2023-12-18 NOTE — TELEPHONE ENCOUNTER
1:57 PM    Reason for Call: Phone Call    Description: Lauren riojas/ [facility] called in asking for the following verbal orders: extend home physical therapy 1 x week for 1 week. Please call Lauren back.     Was an appointment offered for this call? No  If yes : Appointment type              Date    Preferred method for responding to this message: Telephone Call  What is your phone number ? [phone]     If we cannot reach you directly, may we leave a detailed response at the number you provided? Yes    Can this message wait until your PCP/provider returns, if available today? YES    Florencia Zaidi

## 2023-12-21 ENCOUNTER — TELEPHONE (OUTPATIENT)
Dept: INTERNAL MEDICINE | Facility: OTHER | Age: 73
End: 2023-12-21

## 2023-12-21 NOTE — TELEPHONE ENCOUNTER
Siat called from [facility] and wanted a verbal order to start OT 1x a week for a week.  Starting next week.    Please call her at [phone]    Thank you,  Pat Nichols

## 2023-12-27 ENCOUNTER — TELEPHONE (OUTPATIENT)
Dept: INTERNAL MEDICINE | Facility: OTHER | Age: 73
End: 2023-12-27

## 2023-12-27 ENCOUNTER — LAB REQUISITION (OUTPATIENT)
Dept: LAB | Facility: HOSPITAL | Age: 73
End: 2023-12-27
Payer: COMMERCIAL

## 2023-12-27 LAB
ANION GAP SERPL CALCULATED.3IONS-SCNC: 17 MMOL/L (ref 7–15)
BUN SERPL-MCNC: 53 MG/DL (ref 8–23)
CALCIUM SERPL-MCNC: 9.8 MG/DL (ref 8.8–10.2)
CHLORIDE SERPL-SCNC: 100 MMOL/L (ref 98–107)
CREAT SERPL-MCNC: 1.7 MG/DL (ref 0.67–1.17)
DEPRECATED HCO3 PLAS-SCNC: 16 MMOL/L (ref 22–29)
EGFRCR SERPLBLD CKD-EPI 2021: 42 ML/MIN/1.73M2
GLUCOSE SERPL-MCNC: 263 MG/DL (ref 70–99)
POTASSIUM SERPL-SCNC: 4.4 MMOL/L (ref 3.4–5.3)
SODIUM SERPL-SCNC: 133 MMOL/L (ref 135–145)

## 2023-12-27 PROCEDURE — 80048 BASIC METABOLIC PNL TOTAL CA: CPT | Performed by: INTERNAL MEDICINE

## 2023-12-27 NOTE — TELEPHONE ENCOUNTER
Patients homecare nurse called and would like verbal orders to extend home health physical therapy for 1x week for 3 weeks.    Please call Lauren at [phone]    Thank you,  Pat Martinez

## 2023-12-29 ENCOUNTER — TELEPHONE (OUTPATIENT)
Dept: INTERNAL MEDICINE | Facility: OTHER | Age: 73
End: 2023-12-29

## 2023-12-29 DIAGNOSIS — Z53.9 PERSONS ENCOUNTERING HEALTH SERVICES FOR SPECIFIC PROCEDURES, NOT CARRIED OUT: Primary | ICD-10-CM

## 2023-12-29 PROCEDURE — G0179 MD RECERTIFICATION HHA PT: HCPCS | Performed by: INTERNAL MEDICINE

## 2023-12-29 NOTE — TELEPHONE ENCOUNTER
8:12 AM    Reason for Call: Phone Call    Description: Sita riojas/ [employer] called looking for verbal authorization for OT 1 x week for 5 weeks. Please call Sita back.     Was an appointment offered for this call? No  If yes : Appointment type              Date    Preferred method for responding to this message: Telephone Call  What is your phone number ? [phone]     If we cannot reach you directly, may we leave a detailed response at the number you provided? Yes    Can this message wait until your PCP/provider returns, if available today? YES    Florencia Zaidi

## 2024-01-02 ENCOUNTER — TELEPHONE (OUTPATIENT)
Dept: INTERNAL MEDICINE | Facility: OTHER | Age: 74
End: 2024-01-02

## 2024-01-02 NOTE — TELEPHONE ENCOUNTER
10:11 AM    Reason for Call: Phone Call    Description: [facility] needs verbal orders to continue home health services    Was an appointment offered for this call? No  If yes : Appointment type              Date    Preferred method for responding to this message: Telephone Call  What is your phone number ?  [phone]     If we cannot reach you directly, may we leave a detailed response at the number you provided? Yes    Can this message wait until your PCP/provider returns, if available today? Not applicable    Pat Hassan

## 2024-01-02 NOTE — TELEPHONE ENCOUNTER
Talked to eric patient is being discharged form weekly skilled nursing tomorrow she did not that labs will have to be done in office patient is still getting PT/OT at home but does not make any life style adjustments not compliant per eric does not meet his goals.

## 2024-01-03 NOTE — TELEPHONE ENCOUNTER
Return call received from Emmy with [facility]. [facility] is continuing with Home Care for PT/OT. However, patient has been discharged from SNV's in the home.     Emmy reporting patient is fully capable of coming in to the clinic to have labs completed going forward.

## 2024-01-03 NOTE — TELEPHONE ENCOUNTER
A call has been returned to Emmy with [facility], no answer, message left on secure voicemail requesting a return call for an update if patient has been discharged from Home Care Services or if Emmy is still in need of a verbal order for services.     RN CC contact information provided, awaiting a return call.

## 2024-01-05 ENCOUNTER — ALLIED HEALTH/NURSE VISIT (OUTPATIENT)
Dept: EDUCATION SERVICES | Facility: OTHER | Age: 74
End: 2024-01-05
Attending: NURSE PRACTITIONER
Payer: COMMERCIAL

## 2024-01-05 DIAGNOSIS — E11.8 TYPE 2 DIABETES MELLITUS WITH COMPLICATION, WITHOUT LONG-TERM CURRENT USE OF INSULIN (H): Primary | ICD-10-CM

## 2024-01-05 NOTE — PROGRESS NOTES
Pt's daughter brought in the pt's Shaji for download today. This was completed and given to Winnie Quispe for telephone appt with the pt next week.    Laura Tillman

## 2024-01-08 ENCOUNTER — HOSPITAL ENCOUNTER (OUTPATIENT)
Dept: EDUCATION SERVICES | Facility: HOSPITAL | Age: 74
Discharge: HOME | End: 2024-01-08
Attending: NURSE PRACTITIONER | Admitting: NURSE PRACTITIONER
Payer: COMMERCIAL

## 2024-01-08 DIAGNOSIS — E11.65 TYPE 2 DIABETES MELLITUS WITH HYPERGLYCEMIA, WITHOUT LONG-TERM CURRENT USE OF INSULIN (H): Primary | ICD-10-CM

## 2024-01-08 PROCEDURE — 97802 MEDICAL NUTRITION INDIV IN: CPT | Mod: TEL | Performed by: DIETITIAN, REGISTERED

## 2024-01-08 ASSESSMENT — PAIN SCALES - GENERAL: PAINLEVEL: NO PAIN (0)

## 2024-01-08 NOTE — PROGRESS NOTES
Diabetes Self-Management Education & Support    Presents for:  Diabetes Education    Type of Service: Telephone Visit - Pt states unable to attend in person visit.  He is currently receiving PT/OT in his home.  He does not have ability to do video visit.     Originating Location (Patient Location): Home  Distant Location (Provider Location): [facility]  Mode of Communication:  Telephone    Telephone Visit Start Time:  11:21 am  Telephone Visit End Time (telephone visit stop time): 11:45 am    How would patient like to obtain AVS? MyChart    ASSESSMENT:  Pt referred for education with elevated A1c of 10.9% in November.  Pt had not been testing glucose levels at that time but is now using Freestyle Shaij 2 for monitoring.  He states he scans glucose very frequently at home.  Glucose average on download today 156 with GMI of 7.0%.  Pt was started on Jardiance 10 mg late November and discussion notes he has also made positive changes to his diet which I suspect have helped improve his levels.  Lab review of GFR notes recommended Metformin dose of 50% current.  Consider increase Jardiance in favor of lowering Metformin.      Patient's most recent   Lab Results   Component Value Date    A1C 10.9 11/22/2023    A1C 7.5 07/07/2021     is not meeting goal of  <7.5%    Diabetes knowledge and skills assessment:   Patient is knowledgeable in diabetes management concepts related to: Taking Medication    Continue education with the following diabetes management concepts: Healthy Eating - Pt able to state foods high in carbohydrates he is now limiting in diet including pasta, pizza, chips, pretzels. Being Active-Activity is limited but pt states he can walk in his basement - encouraged him to do so after meals.      Based on learning assessment above, most appropriate setting for further diabetes education would be: Individual setting.      PLAN  Pt will continue to work on following, healthy low carbohydrate/low  "sodium meal plan.   Try to walk after each meal in home - even for a short time.  Message sent to provider regarding Metformin and follow up A1c check/labs.   Pt encouraged to call DRC with questions/concerns once he reviews educational materials.     Follow-up: prn.    See Care Plan for co-developed, patient-state behavior change goals.  AVS provided for patient today.    Education Materials Provided:  [facility] Living Well with Diabetes  How to Thrive:  A Guide for your Journey with Diabetes  Lists of low carbohydrate food choices, snacks, recipes    SUBJECTIVE/OBJECTIVE:     Cultural Influences/Ethnic Background:  Not  or     Diabetes Symptoms & Complications:     Patient Problem List and Family Medical History reviewed for relevant medical history, current medical status, and diabetes risk factors.    Vitals:  There were no vitals taken for this visit.  Estimated body mass index is 40.44 kg/m  as calculated from the following:    Height as of 11/22/23: 1.88 m (6' 2\").    Weight as of 11/28/23: 142.9 kg (315 lb).   Last 3 BP:   BP Readings from Last 3 Encounters:   12/06/23 135/75   11/28/23 128/78   04/29/23 154/87       History   Smoking Status    Former    Packs/day: 0.50    Types: Cigars, Cigarettes    Start date: 1/1/2003    Quit date: 6/14/2018   Smokeless Tobacco    Former    Quit date: 2/1/2021       Labs:  Lab Results   Component Value Date    A1C 10.9 11/22/2023    A1C 7.5 07/07/2021     Lab Results   Component Value Date     12/27/2023     04/11/2022     07/07/2021     Lab Results   Component Value Date    LDL 32 11/22/2023    LDL 31 02/02/2021     HDL Cholesterol   Date Value Ref Range Status   02/02/2021 43 >39 mg/dL Final     Direct Measure HDL   Date Value Ref Range Status   11/22/2023 43 >=40 mg/dL Final   ]  GFR Estimate   Date Value Ref Range Status   12/27/2023 42 (L) >60 mL/min/1.73m2 Final   07/07/2021 39 (L) >60 mL/min/[1.73_m2] Final     Comment:     Non " " GFR Calc  Starting 12/18/2018, serum creatinine based estimated GFR (eGFR) will be   calculated using the Chronic Kidney Disease Epidemiology Collaboration   (CKD-EPI) equation.       GFR Estimate If Black   Date Value Ref Range Status   07/07/2021 45 (L) >60 mL/min/[1.73_m2] Final     Comment:      GFR Calc  Starting 12/18/2018, serum creatinine based estimated GFR (eGFR) will be   calculated using the Chronic Kidney Disease Epidemiology Collaboration   (CKD-EPI) equation.       Lab Results   Component Value Date    CR 1.70 12/27/2023    CR 1.73 07/07/2021     No results found for: \"MICROALBUMIN\"    Healthy Eating:  Breakfast:  3 eggs, 3-4 sausage links - occasionally one toast, black coffee  Lunch-Atkins frozen meal or 2 hot dogs/one bread  Supper-2 hamburger patties, salad with vinegar/oil  Rare snacks- bag protein chips    Being Active:  Pt is currently receiving PT and OT in his home.     Monitoring:    Freestyle Shaji 2 AGP Report  12/22/2023 to 01/04/2024    % Time CGM is Active 99%    Average Glucose  156    Glucose Management Indicator  (GMI)    7.0%    Glucose Variabilty  27%    Time in Ranges:  >250 mg/dL   3%  181-250 mg/dL  24%   mg/dL   73%  54-69 mg/dL   0%  <54 mg/dL   0%        Taking Medications:  Diabetes Medication(s)       Biguanides       metFORMIN (GLUCOPHAGE XR) 500 MG 24 hr tablet Take 2 tablets (1,000 mg) by mouth 2 times daily (with meals)       Sodium-Glucose Co-Transporter 2 (SGLT2) Inhibitors       empagliflozin (JARDIANCE) 10 MG TABS tablet Take 1 tablet (10 mg) by mouth daily            Problem Solving:  Pt denies and s/s hypoglycemia.     Healthy Coping:     Patient Activation Measure Survey Score:      6/14/2018    10:00 AM 7/25/2018     2:00 PM   DARA Score (Last Two)   DARA Raw Score 37 40   Activation Score 79.2 100   DARA Level 4 4       Time Spent: 24 minutes  Encounter Type: Individual    Any diabetes medication dose changes were made via " the CDE Protocol per the patient's referring provider. A copy of this encounter was shared with the provider.

## 2024-01-08 NOTE — PATIENT INSTRUCTIONS
Educational materials and name number of educator mailed to home.  Call with any questions/concerns after review.  ANNIE Andres, [facility] [phone].

## 2024-01-08 NOTE — LETTER
1/8/2024        RE: Deon Culver  [address]  [facility] [address]        Deon Culver is a 73 year old male who is being evaluated via a billable telephone visit.          Diabetes Self-Management Education & Support    Presents for:  Diabetes Education    Type of Service: Telephone Visit - Pt states unable to attend in person visit.  He is currently receiving PT/OT in his home.  He does not have ability to do video visit.     Originating Location (Patient Location): Home  Distant Location (Provider Location): [facility]  Mode of Communication:  Telephone    Telephone Visit Start Time:  11:21 am  Telephone Visit End Time (telephone visit stop time): 11:45 am    How would patient like to obtain AVS? MyChart    ASSESSMENT:  Pt referred for education with elevated A1c of 10.9% in November.  Pt had not been testing glucose levels at that time but is now using Freestyle Shaji 2 for monitoring.  He states he scans glucose very frequently at home.  Glucose average on download today 156 with GMI of 7.0%.  Pt was started on Jardiance 10 mg late November and discussion notes he has also made positive changes to his diet which I suspect have helped improve his levels.  Lab review of GFR notes recommended Metformin dose of 50% current.  Consider increase Jardiance in favor of lowering Metformin.      Patient's most recent   Lab Results   Component Value Date    A1C 10.9 11/22/2023    A1C 7.5 07/07/2021     is not meeting goal of  <7.5%    Diabetes knowledge and skills assessment:   Patient is knowledgeable in diabetes management concepts related to: Taking Medication    Continue education with the following diabetes management concepts: Healthy Eating - Pt able to state foods high in carbohydrates he is now limiting in diet including pasta, pizza, chips, pretzels. Being Active-Activity is limited but pt states he can walk in his basement - encouraged him to do so after meals.      Based on learning  "assessment above, most appropriate setting for further diabetes education would be: Individual setting.      PLAN  Pt will continue to work on following, healthy low carbohydrate/low sodium meal plan.   Try to walk after each meal in home - even for a short time.  Message sent to provider regarding Metformin and follow up A1c check/labs.   Pt encouraged to call DR[facility] with questions/concerns once he reviews educational materials.     Follow-up: prn.    See Care Plan for co-developed, patient-state behavior change goals.  AVS provided for patient today.    Education Materials Provided:  Radha Ortiz Living Well with Diabetes  How to Thrive:  A Guide for your Journey with Diabetes  Lists of low carbohydrate food choices, snacks, recipes    SUBJECTIVE/OBJECTIVE:     Cultural Influences/Ethnic Background:  Not  or     Diabetes Symptoms & Complications:     Patient Problem List and Family Medical History reviewed for relevant medical history, current medical status, and diabetes risk factors.    Vitals:  There were no vitals taken for this visit.  Estimated body mass index is 40.44 kg/m  as calculated from the following:    Height as of 11/22/23: 1.88 m (6' 2\").    Weight as of 11/28/23: 142.9 kg (315 lb).   Last 3 BP:   BP Readings from Last 3 Encounters:   12/06/23 135/75   11/28/23 128/78   04/29/23 154/87       History   Smoking Status     Former     Packs/day: 0.50     Types: Cigars, Cigarettes     Start date: 1/1/2003     Quit date: 6/14/2018   Smokeless Tobacco     Former     Quit date: 2/1/2021       Labs:  Lab Results   Component Value Date    A1C 10.9 11/22/2023    A1C 7.5 07/07/2021     Lab Results   Component Value Date     12/27/2023     04/11/2022     07/07/2021     Lab Results   Component Value Date    LDL 32 11/22/2023    LDL 31 02/02/2021     HDL Cholesterol   Date Value Ref Range Status   02/02/2021 43 >39 mg/dL Final     Direct Measure HDL   Date Value Ref Range Status " "  11/22/2023 43 >=40 mg/dL Final   ]  GFR Estimate   Date Value Ref Range Status   12/27/2023 42 (L) >60 mL/min/1.73m2 Final   07/07/2021 39 (L) >60 mL/min/[1.73_m2] Final     Comment:     Non  GFR Calc  Starting 12/18/2018, serum creatinine based estimated GFR (eGFR) will be   calculated using the Chronic Kidney Disease Epidemiology Collaboration   (CKD-EPI) equation.       GFR Estimate If Black   Date Value Ref Range Status   07/07/2021 45 (L) >60 mL/min/[1.73_m2] Final     Comment:      GFR Calc  Starting 12/18/2018, serum creatinine based estimated GFR (eGFR) will be   calculated using the Chronic Kidney Disease Epidemiology Collaboration   (CKD-EPI) equation.       Lab Results   Component Value Date    CR 1.70 12/27/2023    CR 1.73 07/07/2021     No results found for: \"MICROALBUMIN\"    Healthy Eating:  Breakfast:  3 eggs, 3-4 sausage links - occasionally one toast, black coffee  Lunch-Atkins frozen meal or 2 hot dogs/one bread  Supper-2 hamburger patties, salad with vinegar/oil  Rare snacks- bag protein chips    Being Active:  Pt is currently receiving PT and OT in his home.     Monitoring:    Freestyle Shaji 2 AGP Report  12/22/2023 to 01/04/2024    % Time CGM is Active 99%    Average Glucose  156    Glucose Management Indicator  (GMI)    7.0%    Glucose Variabilty  27%    Time in Ranges:  >250 mg/dL   3%  181-250 mg/dL  24%   mg/dL   73%  54-69 mg/dL   0%  <54 mg/dL   0%        Taking Medications:  Diabetes Medication(s)       Biguanides       metFORMIN (GLUCOPHAGE XR) 500 MG 24 hr tablet Take 2 tablets (1,000 mg) by mouth 2 times daily (with meals)       Sodium-Glucose Co-Transporter 2 (SGLT2) Inhibitors       empagliflozin (JARDIANCE) 10 MG TABS tablet Take 1 tablet (10 mg) by mouth daily            Problem Solving:  Pt denies and s/s hypoglycemia.     Healthy Coping:     Patient Activation Measure Survey Score:      6/14/2018    10:00 AM 7/25/2018     2:00 PM   DARA " Score (Last Two)   DARA Raw Score 37 40   Activation Score 79.2 100   DARA Level 4 4       Time Spent: 24 minutes  Encounter Type: Individual    Any diabetes medication dose changes were made via the CDE Protocol per the patient's referring provider. A copy of this encounter was shared with the provider.       Sincerely,        Winnie Quispe RD

## 2024-01-09 DIAGNOSIS — G62.9 NEUROPATHY: ICD-10-CM

## 2024-01-09 RX ORDER — GABAPENTIN 300 MG/1
300 CAPSULE ORAL 3 TIMES DAILY
Qty: 90 CAPSULE | Refills: 0 | Status: SHIPPED | OUTPATIENT
Start: 2024-01-09 | End: 2024-02-02

## 2024-01-09 NOTE — TELEPHONE ENCOUNTER
Gabapentin (Neurontin) 300 MG capsule    Last Written Prescription Date:  12/12/2023  Last Fill Quantity: 90,   # refills: 0  Last Office Visit: 11/28/2023

## 2024-01-12 ENCOUNTER — TELEPHONE (OUTPATIENT)
Dept: INTERNAL MEDICINE | Facility: OTHER | Age: 74
End: 2024-01-12

## 2024-01-12 DIAGNOSIS — E11.8 TYPE 2 DIABETES MELLITUS WITH COMPLICATION, WITHOUT LONG-TERM CURRENT USE OF INSULIN (H): ICD-10-CM

## 2024-01-12 RX ORDER — METFORMIN HCL 500 MG
1000 TABLET, EXTENDED RELEASE 24 HR ORAL
Qty: 180 TABLET | Refills: 3 | Status: SHIPPED | OUTPATIENT
Start: 2024-01-12

## 2024-01-12 NOTE — TELEPHONE ENCOUNTER
1:52 PM    Reason for Call: Phone Call    Description: pt called about the medication change and is very confused about the change    Was an appointment offered for this call? No  If yes : Appointment type              Date    Preferred method for responding to this message: Telephone Call  What is your phone number ? [phone]     If we cannot reach you directly, may we leave a detailed response at the number you provided? Yes    Can this message wait until your PCP/provider returns, if available today? Mone Samaniego

## 2024-01-15 ENCOUNTER — TELEPHONE (OUTPATIENT)
Dept: INTERNAL MEDICINE | Facility: OTHER | Age: 74
End: 2024-01-15

## 2024-01-15 NOTE — TELEPHONE ENCOUNTER
8:47 AM    Reason for Call: Phone Call    Description: Lauren riojas/ [facility] called requesting verbal orders extending Home PT for 1 x per week for 2 weeks. Please call Lauren back.     Was an appointment offered for this call? No  If yes : Appointment type              Date    Preferred method for responding to this message: Telephone Call  What is your phone number ? [phone]     If we cannot reach you directly, may we leave a detailed response at the number you provided? Yes, secure line    Can this message wait until your PCP/provider returns, if available today? Not applicable, PROVIDER IN CLINIC    Florencia Zaidi

## 2024-01-15 NOTE — TELEPHONE ENCOUNTER
Telephone call placed to patient to inquire about medication question.  Patient states he spoke with Diabetic Ed and his questions were answered.

## 2024-01-16 ENCOUNTER — MEDICAL CORRESPONDENCE (OUTPATIENT)
Dept: HEALTH INFORMATION MANAGEMENT | Facility: HOSPITAL | Age: 74
End: 2024-01-16

## 2024-02-01 DIAGNOSIS — I10 BENIGN ESSENTIAL HYPERTENSION: ICD-10-CM

## 2024-02-01 DIAGNOSIS — N40.0 BENIGN PROSTATIC HYPERPLASIA, UNSPECIFIED WHETHER LOWER URINARY TRACT SYMPTOMS PRESENT: ICD-10-CM

## 2024-02-01 RX ORDER — TAMSULOSIN HYDROCHLORIDE 0.4 MG/1
0.8 CAPSULE ORAL DAILY
Qty: 180 CAPSULE | Refills: 2 | Status: SHIPPED | OUTPATIENT
Start: 2024-02-01 | End: 2024-07-26

## 2024-02-01 NOTE — TELEPHONE ENCOUNTER
Disp Refills Start End JEANNIE   tamsulosin (FLOMAX) 0.4 MG capsule 180 capsule 0 11/9/2023 -- No     Last Office Visit: 11/28/2023  Future Office visit:    Next 5 appointments (look out 90 days)      Apr 29, 2024 11:00 AM  (Arrive by 10:45 AM)  SHORT with Sreedhar Mendieta DO  [facility] ([facility] ) [address]  [place] [id number] [phone]             Routing refill request to provider for review/approval because:

## 2024-02-02 DIAGNOSIS — Z86.73 HISTORY OF CVA (CEREBROVASCULAR ACCIDENT): ICD-10-CM

## 2024-02-02 DIAGNOSIS — I10 BENIGN ESSENTIAL HYPERTENSION: ICD-10-CM

## 2024-02-02 DIAGNOSIS — G62.9 NEUROPATHY: ICD-10-CM

## 2024-02-02 RX ORDER — METOPROLOL TARTRATE 50 MG
TABLET ORAL
Qty: 180 TABLET | Refills: 2 | Status: SHIPPED | OUTPATIENT
Start: 2024-02-02

## 2024-02-02 RX ORDER — GABAPENTIN 300 MG/1
300 CAPSULE ORAL 3 TIMES DAILY
Qty: 90 CAPSULE | Refills: 0 | Status: SHIPPED | OUTPATIENT
Start: 2024-02-02 | End: 2024-04-23

## 2024-02-02 NOTE — TELEPHONE ENCOUNTER
gabapentin (NEURONTIN) 300 MG capsule         Last Written Prescription Date:  1/9/24  Last Fill Quantity: 90,   # refills: 0  Last Office Visit: 11/28/23  Future Office visit:    Next 5 appointments (look out 90 days)      Apr 29, 2024 11:00 AM  (Arrive by 10:45 AM)  SHORT with Sreedhar Mendieta DO  [facility] ([facility] ) [address]  [facility] [id number] [phone]             Routing refill request to provider for review/approval because:    Drug not on the [facility], [facility] or [facility] refill protocol or controlled substance

## 2024-02-13 DIAGNOSIS — I51.7 RIGHT VENTRICULAR ENLARGEMENT: Primary | ICD-10-CM

## 2024-02-13 DIAGNOSIS — I71.21 ASCENDING AORTIC ANEURYSM (H): ICD-10-CM

## 2024-02-13 DIAGNOSIS — I10 BENIGN ESSENTIAL HYPERTENSION: ICD-10-CM

## 2024-02-13 DIAGNOSIS — E11.8 TYPE 2 DIABETES MELLITUS WITH COMPLICATION, WITHOUT LONG-TERM CURRENT USE OF INSULIN (H): ICD-10-CM

## 2024-02-13 DIAGNOSIS — N18.31 STAGE 3A CHRONIC KIDNEY DISEASE (H): ICD-10-CM

## 2024-02-13 DIAGNOSIS — I51.89 DIASTOLIC DYSFUNCTION: ICD-10-CM

## 2024-02-13 DIAGNOSIS — E11.65 POORLY CONTROLLED TYPE 2 DIABETES MELLITUS (H): ICD-10-CM

## 2024-02-13 RX ORDER — LISINOPRIL 40 MG/1
40 TABLET ORAL DAILY
Qty: 90 TABLET | Refills: 3 | Status: SHIPPED | OUTPATIENT
Start: 2024-02-13 | End: 2024-08-12

## 2024-02-13 NOTE — TELEPHONE ENCOUNTER
lisinopril (ZESTRIL) 40 MG tablet      Last Written Prescription Date:  6-13-23  Last Fill Quantity: 90,   # refills: 3  Last Office Visit: 11-22-23  Future Office visit:    Next 5 appointments (look out 90 days)      Apr 29, 2024 11:00 AM  (Arrive by 10:45 AM)  SHORT with Sreedhar Mendieta DO  [facility] ([facility] ) [address]  [place] [id number] [phone]             Routing refill request to provider for review/approval because:

## 2024-02-13 NOTE — TELEPHONE ENCOUNTER
Lodi device  Last Written Prescription Date: 11/28/23  Last Fill Quantity: 1 # of Refills: 0  Last Office Visit: 11/28/23

## 2024-03-25 NOTE — TELEPHONE ENCOUNTER
Please see BP's.Pended.Thank you.      Vital Signs 7/25/2018 7/25/2018 9/12/2018 9/12/2018 11/12/2018   Systolic  110  148    Diastolic  78  88    Pulse  69  71      Vital Signs 11/12/2018 1/16/2019 1/16/2019 1/16/2019   Systolic 122  171 165   Diastolic 86  91 93   Pulse 67  52 61      [FreeTextEntry2] : F/U: right knee

## 2024-04-21 DIAGNOSIS — R47.81 SLURRED SPEECH: ICD-10-CM

## 2024-04-21 DIAGNOSIS — Z86.73 HISTORY OF CVA (CEREBROVASCULAR ACCIDENT): ICD-10-CM

## 2024-04-22 DIAGNOSIS — G62.9 NEUROPATHY: ICD-10-CM

## 2024-04-22 RX ORDER — ASPIRIN 81 MG/1
TABLET, CHEWABLE ORAL
Qty: 90 TABLET | Refills: 3 | Status: SHIPPED | OUTPATIENT
Start: 2024-04-22

## 2024-04-22 NOTE — TELEPHONE ENCOUNTER
Disp Refills Start End JEANNIE   aspirin (ASA) 81 MG chewable tablet 90 tablet 3 4/17/2023 -- No     Last Office Visit: 11/22/2023  Future Office visit:    Next 5 appointments (look out 90 days)      Apr 29, 2024 11:00 AM  (Arrive by 10:45 AM)  SHORT with Sreedhar Mendieta DO  [facility] ([facility] ) [address]  [place] [phone]             Routing refill request to provider for review/approval because:

## 2024-04-22 NOTE — TELEPHONE ENCOUNTER
Aspirin (ASA) 81 mg      Last Written Prescription Date:  4/17/23  Last Fill Quantity: 90,   # refills: 3  Last Office Visit: 11/22/23  Future Office visit:    Next 5 appointments (look out 90 days)      Apr 29, 2024 11:00 AM  (Arrive by 10:45 AM)  SHORT with Sreedhar Mendieta DO  [facility] ([facility] ) [address]  [place] [address]  [phone]           Prescription approved per [facility] Refill Protocol.

## 2024-04-22 NOTE — TELEPHONE ENCOUNTER
Disp Refills Start End JEANNIE   gabapentin (NEURONTIN) 300 MG capsule 90 capsule 0 2/2/2024 -- No     Last Office Visit: 11/28/2023  Future Office visit:    Next 5 appointments (look out 90 days)      Apr 29, 2024 11:00 AM  (Arrive by 10:45 AM)  SHORT with Sreedhar Mendieta DO  [facility] ([facility] ) [address]  [place] [phone]             Routing refill request to provider for review/approval because:

## 2024-04-23 RX ORDER — GABAPENTIN 300 MG/1
300 CAPSULE ORAL 3 TIMES DAILY
Qty: 90 CAPSULE | Refills: 0 | Status: SHIPPED | OUTPATIENT
Start: 2024-04-23 | End: 2024-06-14

## 2024-05-12 DIAGNOSIS — I10 BENIGN ESSENTIAL HYPERTENSION: ICD-10-CM

## 2024-05-14 RX ORDER — AMLODIPINE BESYLATE 10 MG/1
10 TABLET ORAL DAILY
Qty: 90 TABLET | Refills: 3 | Status: SHIPPED | OUTPATIENT
Start: 2024-05-14

## 2024-05-14 NOTE — TELEPHONE ENCOUNTER
Amlodipine 10 mg       Last Written Prescription Date:  unknown  Last Fill Quantity: unknown,   # refills: unknown  Last Office Visit: 11/22/2023  Future Office visit:       MAR shows different dose  Routing refill request to provider for review/approval because:

## 2024-05-20 DIAGNOSIS — E11.65 POORLY CONTROLLED TYPE 2 DIABETES MELLITUS (H): ICD-10-CM

## 2024-05-20 NOTE — TELEPHONE ENCOUNTER
Routing refill request to provider for review/approval because:  Drug not on the [facility] or [facility] refill protocol or controlled substance

## 2024-05-20 NOTE — TELEPHONE ENCOUNTER
Sensor       Last Written Prescription Date:  11/28/2023  Last Fill Quantity: 2,   # refills: 5  Last Office Visit: 11/28/2023  Future Office visit:

## 2024-06-14 DIAGNOSIS — G62.9 NEUROPATHY: ICD-10-CM

## 2024-06-14 RX ORDER — GABAPENTIN 300 MG/1
300 CAPSULE ORAL 3 TIMES DAILY
Qty: 90 CAPSULE | Refills: 0 | Status: SHIPPED | OUTPATIENT
Start: 2024-06-14 | End: 2024-09-09

## 2024-06-14 NOTE — TELEPHONE ENCOUNTER
GABAPENTIN 300MG CAPSULES         Last Written Prescription Date:  4/23/24  Last Fill Quantity: 90,   # refills: 0  Last Office Visit: 11/28/23  Future Office visit:       Routing refill request to provider for review/approval because:  Drug not on the [facility], [facility] or [facility] refill protocol or controlled substance

## 2024-06-24 NOTE — PROGRESS NOTES
Chief complaint: Patient presents with:  Toenail: DFE      History of Present Illness: This 73 year old NIDDM male is seen for follow-up management of elongated toenails and a diabetic foot exam.     Patient says he has a lot of numbness in his feet.     Patient is also here for a diabetic foot exam and high risk nail debridement. He prefers to have the nails trimmed every eight months because he says his nails do not grow fast although he has not been seen in over a year. He says today he would like to schedule one another eleven months out from today.    Patient says his most recent DM shoes are about two years old but he does not want new ones at this time.    No further pedal complaints today.          Lab Results   Component Value Date    A1C 10.9 11/22/2023    A1C 11.3 11/20/2023    A1C 6.8 04/11/2022    A1C 7.5 07/07/2021    A1C 7.4 02/02/2021    A1C 6.9 08/05/2020    A1C 6.4 01/15/2020    A1C 6.0 08/28/2019         /75 (BP Location: Left arm, Patient Position: Sitting, Cuff Size: Adult Regular)   Pulse 66   Temp (!) 96.2  F (35.7  C) (Tympanic)   SpO2 98%      Patient Active Problem List   Diagnosis    Benign essential hypertension    Type 2 diabetes mellitus     Chronic fatigue    Slurred speech x 3 months, CT neg ED [facility] 4/25/18    Vision changes x 3 months as of 4/18    Chronic bilateral low back pain with sciatica    Altered gait    Altered mental status - since approx 1/18 - delayed response, change in mentation    Hypertrophic cardiomyopathy at 2.2 cm    Ascending aortic aneurysm at 4.45 cm on echo from 1/7/19    Right ventricular enlargement    Stage 3a chronic kidney disease (H)    History of tobacco abuse    H/O ETOH abuse    History of CVA 3/18    Special screening for malignant neoplasms, colon    Diastolic dysfunction grade 1 on 1/7/19    Obesity (BMI 35.0-39.9) with comorbidity (H)    Benign prostatic hyperplasia, unspecified whether lower urinary tract symptoms present    POWELL  (dyspnea on exertion)    Sedentary lifestyle       History reviewed. No pertinent surgical history.    Current Outpatient Medications   Medication    amLODIPine (NORVASC) 5 MG tablet    aspirin (ASA) 81 MG chewable tablet    atorvastatin (LIPITOR) 40 MG tablet    Cholecalciferol (VITAMIN D-3) 1000 units CAPS    CINNAMON PO    Continuous Blood Gluc  (FREESTYLE LILLIANA 2 READER) BRIANDA    Continuous Blood Gluc Sensor (FREESTYLE LILLIANA 2 SENSOR) MISC    empagliflozin (JARDIANCE) 10 MG TABS tablet    furosemide (LASIX) 20 MG tablet    gabapentin (NEURONTIN) 300 MG capsule    Garlic 1000 MG CAPS    lisinopril (ZESTRIL) 40 MG tablet    metFORMIN (GLUCOPHAGE XR) 500 MG 24 hr tablet    metoprolol tartrate (LOPRESSOR) 50 MG tablet    order for DME    tamsulosin (FLOMAX) 0.4 MG capsule    TURMERIC CURCUMIN PO     No current facility-administered medications for this visit.     Facility-Administered Medications Ordered in Other Visits   Medication    iopamidol (ISOVUE-370) solution 75 mL        No Known Allergies    Family History   Problem Relation Age of Onset    Colon Cancer Mother     Kidney Cancer Father     Kidney Disease Father     Coronary Artery Disease Brother     Pulmonary Embolism Brother     Coronary Artery Disease Brother     Myocardial Infarction Brother        Social History     Socioeconomic History    Marital status: Single     Spouse name: None    Number of children: None    Years of education: None    Highest education level: None   Occupational History    None   Social Needs    Financial resource strain: None    Food insecurity:     Worry: None     Inability: None    Transportation needs:     Medical: None     Non-medical: None   Tobacco Use    Smoking status: Current Some Day Smoker     Packs/day: 0.50     Types: Cigars     Start date: 2003     Last attempt to quit: 2018     Years since quittin.2    Smokeless tobacco: Never Used   Substance and Sexual Activity    Alcohol use: No      Comment: sober 8 months     Drug use: No    Sexual activity: Never   Lifestyle    Physical activity:     Days per week: None     Minutes per session: None    Stress: None   Relationships    Social connections:     Talks on phone: None     Gets together: None     Attends Mandaeism service: None     Active member of club or organization: None     Attends meetings of clubs or organizations: None     Relationship status: None    Intimate partner violence:     Fear of current or ex partner: None     Emotionally abused: None     Physically abused: None     Forced sexual activity: None   Other Topics Concern    Parent/sibling w/ CABG, MI or angioplasty before 65F 55M? Yes   Social History Narrative    None       ROS: 10 point ROS neg other than the symptoms noted above in the HPI.  EXAM  Constitutional: healthy, alert and no distress    Psychiatric: mentation appears normal and affect normal/bright    VASCULAR:  -Dorsalis pedis pulse +1/4 b/l  -Posterior tibial pulse +1/4 b/l  -Capillary refill time < 3 seconds to b/l hallux  -Hair growth Absent to b/l anterior legs and ankles  NEURO:  -Numbness to bilateral plantar foot  DERM:  -Skin diffusely dry and flaking to bilateral foot  -Skin temperature within normal limits bilaterally   -Skin mildly dry to bilateral feet  -Toenails elongated, thickened, dystrophic and discolored x 10 with subungual debris    MSK:  -Moderate decrease in arch height while patient is NWB  -Muscle strength of ankles +5/5 for dorsiflexion, plantarflexion, ABDUction and ADDuction b/l    ============================================================    ASSESSMENT:  (L60.3) Onychodystrophy  (primary encounter diagnosis)    (E11.9) Diabetes mellitus type 2, noninsulin dependent (H)    (E11.42) Diabetic polyneuropathy associated with type 2 diabetes mellitus (H)    (Z68.35) BMI 35.0-35.9,adult    (N18.3) Chronic kidney disease, stage 3 (moderate) (H)      PLAN:  -Patient evaluated and examined. Treatment  options discussed with no educational barriers noted.    -High risk toenail debridement x 10 toenails without incident  -Pinpoint bleeding to RIGHT distal hallux. Applied triple antibiotic ointment and a band aid. Patient to remove within 24 hours.    -Diabetic Foot Education provided. This included checking the feet daily looking for new new blisters or wounds, wearing shoes at all times when walking including around the house, and avoiding lotion application between the toes. Any sign of infection in the foot warrant's the patient presenting to the ED as soon as possible.  ---If patient has any open wounds or develops any signs of infection in his feet (redness, swelling, pain, purulence, fever, chills, nausea, vomiting), then he is advised to call the podiatry clinic or go to the Emergency Department.    -DM shoes: Patient does not want new shoes at this time. He thinks his most recent pair are from around 2021 and he is not ready for new shoes.    Diabetes Mellitus: Patient's DM is managed by their PCP. The DM appears to be unstable. Patient's last HbA1C was 10.9% on 11/22/2023.    CKD: Patient's CKDis managed by their PCP. The kidney function appears to be stable. Patient's last GFR was 52 on 11/22/2023. The reduced kidney function contributes to increased edema in the foot.     -Patient in agreement with the above treatment plan and all of patient's questions were answered.      RTC 8 months for diabetic foot exam and high risk nail debridement per patient request        Leonie Og, NEEL, NEEL   Preseptal Cellulitis, Adult  Preseptal cellulitis is an infection of the eyelid and the tissues around the eye (periorbital area). The infection causes painful swelling and redness. This condition may also be called periorbital cellulitis.    In most cases, the condition can be treated with antibiotic medicine at home. It is important to treat preseptal cellulitis right away so that it does not get worse. If it gets worse, it can spread to the eye socket and eye muscles (orbital cellulitis). Orbital cellulitis is a medical emergency.    What are the causes?  Preseptal cellulitis is most commonly caused by bacteria. In rare cases, it can be caused by a virus or fungus. The germs that cause preseptal cellulitis may come from:  A sinus infection that spreads near the eyes.  An injury near the eye, such as a scratch, puncture wound, animal bite, or insect bite.  A skin rash, such as eczema or poison ivy, that becomes infected.  An infected pimple on the eyelid (stye).  Infection after eyelid surgery or injury.  What increases the risk?  You are more likely to develop this condition if:  You have a weakened disease-fighting system (immune system).  You have a medical condition that raises your risk for sinus infections, such as nasal polyps.  What are the signs or symptoms?    Symptoms of this condition include:  Eyelids that are red and swollen and feel unusually hot.  Fever.  Difficulty opening the eye.  Headache.  Pain in the face.  Symptoms of this condition usually develop suddenly.    How is this diagnosed?  This condition may be diagnosed based on your symptoms, your medical history, and an eye exam. You may also have tests, such as:  Blood tests.  Tests (cultures) to find out which specific bacteria are causing the infection. You may have a culture of any open wound or drainage.  CT scan.  MRI. This is less common.  How is this treated?  This condition is treated with antibiotic medicines. These may be given by mouth (orally), through an IV, or as an injection. In rare cases, you may need surgery to drain an infected area.    Follow these instructions at home:  Medicines    Take your antibiotic medicine as told by your health care provider. Do not stop taking the antibiotic even if you start to feel better  Take over-the-counter and prescription medicines only as told by your health care provider.  Eye Care    Do not use eye drops without first getting approval from your health care provider.  Do not touch or rub your eye. If you wear contact lenses, do not wear them until your health care provider approves.  Keep the eye area clean and dry.  Wash the eye area with a clean washcloth, warm water, and baby shampoo or mild soap.  To help relieve discomfort, place a clean washcloth that is wet with warm water over your eye. Leave the washcloth on for a few minutes, then remove it.  General instructions    Wash your hands with soap and water often for at least 20 seconds. If soap and water are not available, use hand .  Do not use any products that contain nicotine or tobacco, such as cigarettes, e-cigarettes, and chewing tobacco. If you need help quitting, ask your health care provider.  Drink enough fluid to keep your urine pale yellow.  Do not drive or operate machinery until your health care provider says that it is safe. Ask your health care provider if it is safe for you to drive.  Stay up to date on your vaccinations.  Keep all follow-up visits. This includes any visits with an eye specialist (ophthalmologist) or dentist. This is important.  Get help right away if:  You have new symptoms.  Your symptoms get worse or do not get better with treatment.  You have a fever.  Your vision becomes blurry or gets worse in any way.  Your eye looks like it is sticking out or bulging out (proptosis).  You develop double vision.  You have trouble moving your eyes or pain when moving your eyes  You have a severe headache.  You have neck stiffness or severe neck pain.  These symptoms may represent a serious problem that is an emergency. Do not wait to see if the symptoms will go away. Get medical help right away. Call your local emergency services (911 in the [place] Do not drive yourself to the hospital.    Summary  Preseptal cellulitis is an infection of the eyelid and the tissues around the eye.  Symptoms of preseptal cellulitis usually develop suddenly and include red and swollen eyelids, fever, difficulty opening the eye, headache, and facial pain.  This condition is treated with antibiotic medicines. Do not stop taking the antibiotic even if you start to feel better.  Preseptal cellulitis can develop into orbital cellulitis, which is a medical emergency. If your condition does not improve or worsens, visit your debbie care provider right away.  This information is not intended to replace advice given to you by your health care provider. Make sure you discuss any questions you have with your health care provider.

## 2024-07-06 ENCOUNTER — HEALTH MAINTENANCE LETTER (OUTPATIENT)
Age: 74
End: 2024-07-06

## 2024-07-07 DIAGNOSIS — E11.65 POORLY CONTROLLED TYPE 2 DIABETES MELLITUS (H): ICD-10-CM

## 2024-07-09 NOTE — TELEPHONE ENCOUNTER
Shaji Sensor       Last Written Prescription Date:  5/21/2024  Last Fill Quantity: 2,   # refills: 1  Last Office Visit: 11/28/2023

## 2024-07-23 ENCOUNTER — TELEPHONE (OUTPATIENT)
Dept: INTERNAL MEDICINE | Facility: OTHER | Age: 74
End: 2024-07-23

## 2024-07-23 NOTE — TELEPHONE ENCOUNTER
Call placed to [facility], update provided [facility] Special Diets Form completed and left at Registration Desk to be picked up per request.     A copy sent to scanning for filing, see media tab.

## 2024-07-23 NOTE — TELEPHONE ENCOUNTER
Form received 7-23-24 ,placed in provider's wall bin.   After form is completed patient would like form to be: please complete form and call patient's daughter to  by 7-25-24 [phone]

## 2024-07-23 NOTE — TELEPHONE ENCOUNTER
[facility] Special [employer] Form received requesting to be completed and provide an update to [facility] at [phone] when the form is completed and ready to be picked up.

## 2024-07-26 DIAGNOSIS — I10 BENIGN ESSENTIAL HYPERTENSION: ICD-10-CM

## 2024-07-26 DIAGNOSIS — E11.8 TYPE 2 DIABETES MELLITUS WITH COMPLICATION, WITHOUT LONG-TERM CURRENT USE OF INSULIN (H): ICD-10-CM

## 2024-07-26 DIAGNOSIS — N40.0 BENIGN PROSTATIC HYPERPLASIA, UNSPECIFIED WHETHER LOWER URINARY TRACT SYMPTOMS PRESENT: ICD-10-CM

## 2024-07-26 DIAGNOSIS — R06.09 DOE (DYSPNEA ON EXERTION): ICD-10-CM

## 2024-07-26 RX ORDER — TAMSULOSIN HYDROCHLORIDE 0.4 MG/1
0.8 CAPSULE ORAL DAILY
Qty: 180 CAPSULE | Refills: 1 | Status: SHIPPED | OUTPATIENT
Start: 2024-07-26

## 2024-07-26 NOTE — TELEPHONE ENCOUNTER
Disp Refills Start End JEANNIE   empagliflozin (JARDIANCE) 10 MG TABS tablet 90 tablet 3 11/22/2023 -- No     Last Office Visit: 11/22/2023  Future Office visit:       Routing refill request to provider for review/approval because:

## 2024-07-30 RX ORDER — EMPAGLIFLOZIN 10 MG/1
10 TABLET, FILM COATED ORAL DAILY
Qty: 90 TABLET | Refills: 0 | Status: SHIPPED | OUTPATIENT
Start: 2024-07-30

## 2024-08-11 DIAGNOSIS — N18.31 STAGE 3A CHRONIC KIDNEY DISEASE (H): ICD-10-CM

## 2024-08-11 DIAGNOSIS — I51.7 RIGHT VENTRICULAR ENLARGEMENT: ICD-10-CM

## 2024-08-11 DIAGNOSIS — E11.65 POORLY CONTROLLED TYPE 2 DIABETES MELLITUS (H): ICD-10-CM

## 2024-08-11 DIAGNOSIS — I10 BENIGN ESSENTIAL HYPERTENSION: ICD-10-CM

## 2024-08-11 DIAGNOSIS — E11.8 TYPE 2 DIABETES MELLITUS WITH COMPLICATION, WITHOUT LONG-TERM CURRENT USE OF INSULIN (H): ICD-10-CM

## 2024-08-11 DIAGNOSIS — I51.89 DIASTOLIC DYSFUNCTION: ICD-10-CM

## 2024-08-11 DIAGNOSIS — I71.21 ASCENDING AORTIC ANEURYSM (H): ICD-10-CM

## 2024-08-12 RX ORDER — LISINOPRIL 40 MG/1
40 TABLET ORAL DAILY
Qty: 90 TABLET | Refills: 0 | Status: SHIPPED | OUTPATIENT
Start: 2024-08-12

## 2024-08-12 NOTE — TELEPHONE ENCOUNTER
FREESTYLE LILLIANA 2 SENSOR         Last Written Prescription Date:  7/9/24  Last Fill Quantity: 2,   # refills: 0  Last Office Visit: 11/28/23  Future Office visit:    Next 5 appointments (look out 90 days)      Nov 04, 2024 10:30 AM  (Arrive by 10:15 AM)  Return Visit with Chas Armendariz DO  [facility] [place] ([facility] [place] ) [address]  [place] [address]  [phone]     Nov 06, 2024 9:30 AM  (Arrive by 9:15 AM)  Return Visit with Leonie Og DPM  [facility] ([facility] ) [address] [place] [id number]  [phone]             Routing refill request to provider for review/approval because:    LISINOPRIL 40MG TABLETS         Last Written Prescription Date:  2/13/24  Last Fill Quantity: 90,   # refills: 3  Last Office Visit: 11/28/23  Future Office visit:    Next 5 appointments (look out 90 days)      Nov 04, 2024 10:30 AM  (Arrive by 10:15 AM)  Return Visit with Chas Armendariz DO  [facility] [place] ([facility] [place] ) [address]  [place] [address]  [phone]     Nov 06, 2024 9:30 AM  (Arrive by 9:15 AM)  Return Visit with Leonie Og DPM  [facility] ([facility] ) [address] [place] [address]  [phone]             Routing refill request to provider for review/approval because:    ACE Inhibitors (Including Combos) Protocol [id number]/11/2024 06:19 AM   Protocol Details Has GFR on file in past 12 months and most recent value is normal     GFR Estimate   Date Value Ref Range Status   12/27/2023 42 (L) >60 mL/min/1.73m2 Final   07/07/2021 39 (L) >60 mL/min/[1.73_m2] Final     Comment:     Non  GFR Calc  Starting 12/18/2018, serum creatinine based estimated GFR (eGFR) will be   calculated using the Chronic Kidney Disease Epidemiology Collaboration    (CKD-EPI) equation.

## 2024-09-08 DIAGNOSIS — G62.9 NEUROPATHY: ICD-10-CM

## 2024-09-09 RX ORDER — GABAPENTIN 300 MG/1
300 CAPSULE ORAL 3 TIMES DAILY
Qty: 90 CAPSULE | Refills: 0 | Status: SHIPPED | OUTPATIENT
Start: 2024-09-09

## 2024-09-09 NOTE — TELEPHONE ENCOUNTER
GABAPENTIN 300MG CAPSULES         Last Written Prescription Date:  6/14/24  Last Fill Quantity: 90,   # refills: 0  Last Office Visit: 11/28/23  Future Office visit:    Next 5 appointments (look out 90 days)      Nov 04, 2024 10:30 AM  (Arrive by 10:15 AM)  [facility] Visit with Chas Armendariz DO  [facility] [address] [place] [address]  [phone]     Nov 06, 2024 9:30 AM  (Arrive by 9:15 AM)  Return Visit with Leonie Og DPM  [facility] ([facility] ) [address] [place] [address] [phone]             Routing refill request to provider for review/approval because:  Drug not on the [facility], [facility] or  Health refill protocol or controlled substance

## 2024-09-13 ENCOUNTER — TELEPHONE (OUTPATIENT)
Dept: INTERNAL MEDICINE | Facility: OTHER | Age: 74
End: 2024-09-13

## 2024-10-09 DIAGNOSIS — G62.9 NEUROPATHY: ICD-10-CM

## 2024-10-09 NOTE — TELEPHONE ENCOUNTER
GABAPENTIN 300MG CAPSULES             Last Written Prescription Date:  9/9/24  Last Fill Quantity: 90,   # refills: 0  Last Office Visit: 11/28/23  Future Office visit:    Next 5 appointments (look out 90 days)      Oct 21, 2024 2:00 PM  (Arrive by 1:45 PM)  Provider Visit with Sreedhar Mendieta DO  [facility] ([facility] ) [address] [facility] [id number] [phone]     Nov 04, 2024 10:30 AM  (Arrive by 10:15 AM)  Return Visit with Chas Armendariz DO  [facility] ([facility] ) [address] [place] [address]  [phone]     Nov 06, 2024 9:30 AM  (Arrive by 9:15 AM)  Return Visit with Leonie Og DPM  [facility] ([facility] ) [address] [facility] [id number] [phone]             Routing refill request to provider for review/approval because:  Drug not on the G, P or [facility] refill protocol or controlled substance

## 2024-10-11 RX ORDER — GABAPENTIN 300 MG/1
300 CAPSULE ORAL 3 TIMES DAILY
Qty: 90 CAPSULE | Refills: 0 | Status: SHIPPED | OUTPATIENT
Start: 2024-10-11 | End: 2024-10-21

## 2024-10-20 DIAGNOSIS — I51.89 DIASTOLIC DYSFUNCTION: ICD-10-CM

## 2024-10-20 DIAGNOSIS — I10 BENIGN ESSENTIAL HYPERTENSION: Primary | ICD-10-CM

## 2024-10-20 DIAGNOSIS — R06.09 DOE (DYSPNEA ON EXERTION): ICD-10-CM

## 2024-10-20 DIAGNOSIS — N18.31 STAGE 3A CHRONIC KIDNEY DISEASE (H): ICD-10-CM

## 2024-10-20 DIAGNOSIS — R60.0 PERIPHERAL EDEMA: ICD-10-CM

## 2024-10-21 ENCOUNTER — OFFICE VISIT (OUTPATIENT)
Dept: INTERNAL MEDICINE | Facility: OTHER | Age: 74
End: 2024-10-21
Attending: INTERNAL MEDICINE
Payer: COMMERCIAL

## 2024-10-21 DIAGNOSIS — R60.0 PERIPHERAL EDEMA: ICD-10-CM

## 2024-10-21 DIAGNOSIS — N18.31 STAGE 3A CHRONIC KIDNEY DISEASE (H): ICD-10-CM

## 2024-10-21 DIAGNOSIS — N18.30 STAGE 3 CHRONIC KIDNEY DISEASE, UNSPECIFIED WHETHER STAGE 3A OR 3B CKD (H): ICD-10-CM

## 2024-10-21 DIAGNOSIS — E11.8 TYPE 2 DIABETES MELLITUS WITH COMPLICATION, WITHOUT LONG-TERM CURRENT USE OF INSULIN (H): Primary | ICD-10-CM

## 2024-10-21 DIAGNOSIS — I51.7 RIGHT VENTRICULAR ENLARGEMENT: ICD-10-CM

## 2024-10-21 DIAGNOSIS — Z23 NEED FOR INFLUENZA VACCINATION: ICD-10-CM

## 2024-10-21 DIAGNOSIS — I51.89 DIASTOLIC DYSFUNCTION: ICD-10-CM

## 2024-10-21 DIAGNOSIS — R47.81 SLURRED SPEECH: ICD-10-CM

## 2024-10-21 DIAGNOSIS — R06.09 DOE (DYSPNEA ON EXERTION): ICD-10-CM

## 2024-10-21 DIAGNOSIS — I10 BENIGN ESSENTIAL HYPERTENSION: ICD-10-CM

## 2024-10-21 DIAGNOSIS — R94.4 ABNORMAL RENAL FUNCTION TEST: ICD-10-CM

## 2024-10-21 DIAGNOSIS — Z86.73 HISTORY OF CVA (CEREBROVASCULAR ACCIDENT): ICD-10-CM

## 2024-10-21 DIAGNOSIS — I71.21 ANEURYSM OF ASCENDING AORTA WITHOUT RUPTURE (H): ICD-10-CM

## 2024-10-21 DIAGNOSIS — E78.2 MIXED HYPERLIPIDEMIA: ICD-10-CM

## 2024-10-21 DIAGNOSIS — G62.9 NEUROPATHY: ICD-10-CM

## 2024-10-21 LAB
ALBUMIN SERPL BCG-MCNC: 4.4 G/DL (ref 3.5–5.2)
ALP SERPL-CCNC: 109 U/L (ref 40–150)
ALT SERPL W P-5'-P-CCNC: 25 U/L (ref 0–70)
ANION GAP SERPL CALCULATED.3IONS-SCNC: 19 MMOL/L (ref 7–15)
AST SERPL W P-5'-P-CCNC: 18 U/L (ref 0–45)
BASOPHILS # BLD AUTO: 0.1 10E3/UL (ref 0–0.2)
BASOPHILS NFR BLD AUTO: 1 %
BILIRUB SERPL-MCNC: 0.3 MG/DL
BUN SERPL-MCNC: 38.4 MG/DL (ref 8–23)
CALCIUM SERPL-MCNC: 10.2 MG/DL (ref 8.8–10.4)
CHLORIDE SERPL-SCNC: 102 MMOL/L (ref 98–107)
CHOLEST SERPL-MCNC: 114 MG/DL
CREAT SERPL-MCNC: 1.82 MG/DL (ref 0.67–1.17)
EGFRCR SERPLBLD CKD-EPI 2021: 39 ML/MIN/1.73M2
EOSINOPHIL # BLD AUTO: 0.1 10E3/UL (ref 0–0.7)
EOSINOPHIL NFR BLD AUTO: 1 %
ERYTHROCYTE [DISTWIDTH] IN BLOOD BY AUTOMATED COUNT: 12.9 % (ref 10–15)
EST. AVERAGE GLUCOSE BLD GHB EST-MCNC: 194 MG/DL
FASTING STATUS PATIENT QL REPORTED: NO
FASTING STATUS PATIENT QL REPORTED: NO
GLUCOSE SERPL-MCNC: 206 MG/DL (ref 70–99)
HBA1C MFR BLD: 8.4 %
HCO3 SERPL-SCNC: 15 MMOL/L (ref 22–29)
HCT VFR BLD AUTO: 50.1 % (ref 40–53)
HDLC SERPL-MCNC: 39 MG/DL
HGB BLD-MCNC: 17 G/DL (ref 13.3–17.7)
IMM GRANULOCYTES # BLD: 0 10E3/UL
IMM GRANULOCYTES NFR BLD: 0 %
LDLC SERPL CALC-MCNC: 46 MG/DL
LYMPHOCYTES # BLD AUTO: 1.7 10E3/UL (ref 0.8–5.3)
LYMPHOCYTES NFR BLD AUTO: 19 %
MCH RBC QN AUTO: 32 PG (ref 26.5–33)
MCHC RBC AUTO-ENTMCNC: 33.9 G/DL (ref 31.5–36.5)
MCV RBC AUTO: 94 FL (ref 78–100)
MONOCYTES # BLD AUTO: 0.5 10E3/UL (ref 0–1.3)
MONOCYTES NFR BLD AUTO: 5 %
NEUTROPHILS # BLD AUTO: 6.7 10E3/UL (ref 1.6–8.3)
NEUTROPHILS NFR BLD AUTO: 74 %
NONHDLC SERPL-MCNC: 75 MG/DL
PLATELET # BLD AUTO: 158 10E3/UL (ref 150–450)
POTASSIUM SERPL-SCNC: 5.3 MMOL/L (ref 3.4–5.3)
PROT SERPL-MCNC: 7.1 G/DL (ref 6.4–8.3)
RBC # BLD AUTO: 5.32 10E6/UL (ref 4.4–5.9)
SODIUM SERPL-SCNC: 136 MMOL/L (ref 135–145)
TRIGL SERPL-MCNC: 145 MG/DL
WBC # BLD AUTO: 9 10E3/UL (ref 4–11)

## 2024-10-21 PROCEDURE — 36415 COLL VENOUS BLD VENIPUNCTURE: CPT | Mod: ZL | Performed by: INTERNAL MEDICINE

## 2024-10-21 PROCEDURE — 83036 HEMOGLOBIN GLYCOSYLATED A1C: CPT | Mod: ZL | Performed by: INTERNAL MEDICINE

## 2024-10-21 PROCEDURE — 99214 OFFICE O/P EST MOD 30 MIN: CPT | Performed by: INTERNAL MEDICINE

## 2024-10-21 PROCEDURE — 85025 COMPLETE CBC W/AUTO DIFF WBC: CPT | Mod: ZL | Performed by: INTERNAL MEDICINE

## 2024-10-21 PROCEDURE — 80053 COMPREHEN METABOLIC PANEL: CPT | Mod: ZL | Performed by: INTERNAL MEDICINE

## 2024-10-21 PROCEDURE — G0008 ADMIN INFLUENZA VIRUS VAC: HCPCS

## 2024-10-21 PROCEDURE — G0463 HOSPITAL OUTPT CLINIC VISIT: HCPCS | Mod: 25

## 2024-10-21 PROCEDURE — 80061 LIPID PANEL: CPT | Mod: ZL | Performed by: INTERNAL MEDICINE

## 2024-10-21 RX ORDER — ZINC GLUCONATE 50 MG
50 TABLET ORAL DAILY
COMMUNITY

## 2024-10-21 RX ORDER — ASPIRIN 81 MG/1
TABLET, CHEWABLE ORAL
Qty: 90 TABLET | Refills: 3 | Status: SHIPPED | OUTPATIENT
Start: 2024-10-21

## 2024-10-21 RX ORDER — METFORMIN HYDROCHLORIDE 500 MG/1
1000 TABLET, EXTENDED RELEASE ORAL
Qty: 180 TABLET | Refills: 3 | Status: SHIPPED | OUTPATIENT
Start: 2024-10-21

## 2024-10-21 RX ORDER — GABAPENTIN 300 MG/1
300 CAPSULE ORAL 3 TIMES DAILY
Qty: 90 CAPSULE | Refills: 3 | Status: SHIPPED | OUTPATIENT
Start: 2024-10-21

## 2024-10-21 RX ORDER — FUROSEMIDE 20 MG/1
20 TABLET ORAL DAILY
Qty: 90 TABLET | Refills: 3 | Status: SHIPPED | OUTPATIENT
Start: 2024-10-21 | End: 2024-10-21

## 2024-10-21 RX ORDER — AMLODIPINE BESYLATE 10 MG/1
10 TABLET ORAL DAILY
Qty: 90 TABLET | Refills: 3 | Status: SHIPPED | OUTPATIENT
Start: 2024-10-21

## 2024-10-21 RX ORDER — ATORVASTATIN CALCIUM 40 MG/1
40 TABLET, FILM COATED ORAL DAILY
Qty: 90 TABLET | Refills: 3 | Status: SHIPPED | OUTPATIENT
Start: 2024-10-21

## 2024-10-21 RX ORDER — METOPROLOL TARTRATE 50 MG
TABLET ORAL
Qty: 180 TABLET | Refills: 2 | Status: SHIPPED | OUTPATIENT
Start: 2024-10-21

## 2024-10-21 RX ORDER — LISINOPRIL 40 MG/1
40 TABLET ORAL DAILY
Qty: 90 TABLET | Refills: 3 | Status: SHIPPED | OUTPATIENT
Start: 2024-10-21

## 2024-10-21 RX ORDER — FUROSEMIDE 20 MG/1
20 TABLET ORAL DAILY
Qty: 90 TABLET | Refills: 3 | Status: SHIPPED | OUTPATIENT
Start: 2024-10-21

## 2024-10-21 NOTE — TELEPHONE ENCOUNTER
furosemide (LASIX) 20 MG tablet 90 tablet 3 11/22/2023     Last Office Visit: 11/22/2023     Future Office visit:    Next 5 appointments (look out 90 days)      Oct 21, 2024 2:00 PM  (Arrive by 1:45 PM)  Provider Visit with Sreedhar Mendieta DO  [facility] ([facility] ) [address] [facility] [id number] [phone]     Nov 04, 2024 10:30 AM  (Arrive by 10:15 AM)  Return Visit with Chas [facility]iz DO  [facility] [address] [place] [address]  [phone]     Nov 06, 2024 9:30 AM  (Arrive by 9:15 AM)  Return Visit with Leonie Og DPM  [facility] ([facility] ) [address] [facility] [id number] [phone]             Routing refill request to provider for review/approval because:

## 2024-10-21 NOTE — PROGRESS NOTES
DM follow up     Assessment & Plan   Problem List Items Addressed This Visit          Respiratory    POWELL (dyspnea on exertion)    Relevant Medications    empagliflozin (JARDIANCE) 10 MG TABS tablet    furosemide (LASIX) 20 MG tablet       Endocrine    Type 2 diabetes mellitus  - Primary    Relevant Medications    atorvastatin (LIPITOR) 40 MG tablet    metFORMIN (GLUCOPHAGE XR) 500 MG 24 hr tablet    lisinopril (ZESTRIL) 40 MG tablet    empagliflozin (JARDIANCE) 10 MG TABS tablet    Other Relevant Orders    Hemoglobin A1c    Albumin Random Urine Quantitative with Creat Ratio    Comprehensive metabolic panel (BMP + Alb, Alk Phos, ALT, AST, Total. Bili, TP)    Lipid Profile (Chol, Trig, HDL, LDL calc)    Mixed hyperlipidemia    Relevant Medications    atorvastatin (LIPITOR) 40 MG tablet       Circulatory    Benign essential hypertension    Relevant Medications    amLODIPine (NORVASC) 10 MG tablet    metoprolol tartrate (LOPRESSOR) 50 MG tablet    lisinopril (ZESTRIL) 40 MG tablet    furosemide (LASIX) 20 MG tablet    Other Relevant Orders    CBC with platelets and differential    Ascending aortic aneurysm at 4.45 cm on echo from 1/7/19    Relevant Medications    atorvastatin (LIPITOR) 40 MG tablet    Other Relevant Orders    CBC with platelets and differential    Right ventricular enlargement    Relevant Medications    lisinopril (ZESTRIL) 40 MG tablet    Diastolic dysfunction grade 1 on 1/7/19    Relevant Medications    lisinopril (ZESTRIL) 40 MG tablet    furosemide (LASIX) 20 MG tablet    Other Relevant Orders    CBC with platelets and differential       Urinary    Stage 3a chronic kidney disease (H)    Relevant Medications    lisinopril (ZESTRIL) 40 MG tablet    furosemide (LASIX) 20 MG tablet    Other Relevant Orders    CBC with platelets and differential       Other    Slurred speech x 3 months, CT neg ED [facility] 4/25/18    Relevant Medications    aspirin (ASA) 81 MG chewable tablet    atorvastatin (LIPITOR) 40  "MG tablet    History of CVA 3/18    Relevant Medications    aspirin (ASA) 81 MG chewable tablet    atorvastatin (LIPITOR) 40 MG tablet    metoprolol tartrate (LOPRESSOR) 50 MG tablet     Other Visit Diagnoses       Neuropathy        Relevant Medications    gabapentin (NEURONTIN) 300 MG capsule    Peripheral edema        Relevant Medications    furosemide (LASIX) 20 MG tablet    Need for influenza vaccination        Relevant Orders    INFLUENZA HIGH DOSE, TRIVALENT, PF (FLUZONE) (Completed)    Abnormal renal function test        Stage 3 chronic kidney disease, unspecified whether stage 3a or 3b CKD (H)                      Nicotine/Tobacco Cessation  He reports that he has been smoking cigars and cigarettes. He started smoking about 21 years ago. He has a 7.7 pack-year smoking history. He has been exposed to tobacco smoke. He has never used smokeless tobacco.        BMI  Estimated body mass index is 40.44 kg/m  as calculated from the following:    Height as of 11/22/23: 1.88 m (6' 2\").    Weight as of 11/28/23: 142.9 kg (315 lb).           Subjective   Mendoza is a 74 year old, presenting for the following health issues:  Diabetes    History of Present Illness       Reason for visit:  Blood work      Mendoza is a 74-year-old male who presents today for routine annual follow-up.  He has been a bit resistant to follow-up in the past.  He is accompanied by his daughter and significant other today.  He does state that a week or so ago a nurse came in and did a brief physical exam on him.  He was notified by phone and told that the service was free.  At that visit his A1c was said to be 6.0.  We did discuss vaccinations today.  Overall he is not that enthusiastic about it however is willing to complete his influenza vaccine at this time.  Mendoza is not fasting today however due for numerous labs hence we will go ahead and obtain fasting lipids along with routine labs.  He is not yet due for PSA as it is a bit too early.  We will " also get a repeat A1c as we cannot certify his A1c value of 6.0 from his home visit.  He also reports some discomfort in his thighs bilaterally.  This occurs when he stands up and ambulates.  He does use a walker and states that he has had no falls.  He states that the pain in the thighs tends to only last a brief amount of time and then dissipates.  He denies any associated low back pain.        Review of Systems  Constitutional, neuro, ENT, endocrine, pulmonary, cardiac, gastrointestinal, genitourinary, musculoskeletal, integument and psychiatric systems are negative, except as otherwise noted.      Objective    There were no vitals taken for this visit.  There is no height or weight on file to calculate BMI.  Physical Exam   GENERAL: alert and no distress  EYES: Eyes grossly normal to inspection, PERRL and conjunctivae and sclerae normal  RESP: lungs clear to auscultation - no rales, rhonchi or wheezes  CV: regular rate and rhythm, normal S1 S2, no S3 or S4, no murmur,s  MS: +1 to +2 lower extremity edema left greater than right.  MS: Relates with the use of walker.  PSYCH: mentation appears normal, affect normal/bright    Lab Requisition on 12/27/2023   Component Date Value Ref Range Status    Sodium 12/27/2023 133 (L)  135 - 145 mmol/L Final    Reference intervals for this test were updated on 09/26/2023 to more accurately reflect our healthy population. There may be differences in the flagging of prior results with similar values performed with this method. Interpretation of those prior results can be made in the context of the updated reference intervals.     Potassium 12/27/2023 4.4  3.4 - 5.3 mmol/L Final    Chloride 12/27/2023 100  98 - 107 mmol/L Final    Carbon Dioxide (CO2) 12/27/2023 16 (L)  22 - 29 mmol/L Final    Anion Gap 12/27/2023 17 (H)  7 - 15 mmol/L Final    Urea Nitrogen 12/27/2023 53.0 (H)  8.0 - 23.0 mg/dL Final    Creatinine 12/27/2023 1.70 (H)  0.67 - 1.17 mg/dL Final    GFR Estimate  12/27/2023 42 (L)  >60 mL/min/1.73m2 Final    Calcium 12/27/2023 9.8  8.8 - 10.2 mg/dL Final    Glucose 12/27/2023 263 (H)  70 - 99 mg/dL Final     Results for orders placed or performed in visit on 10/21/24   CBC with platelets and differential     Status: None (In process)    Narrative    The following orders were created for panel order CBC with platelets and differential.  Procedure                               Abnormality         Status                     ---------                               -----------         ------                     CBC with platelets and d...[[id number]]                      In process                   Please view results for these tests on the individual orders.     Results for orders placed or performed in visit on 10/21/24 (from the past 24 hour(s))   CBC with platelets and differential    Narrative    The following orders were created for panel order CBC with platelets and differential.  Procedure                               Abnormality         Status                     ---------                               -----------         ------                     CBC with platelets and d...[[id number]]                      In process                   Please view results for these tests on the individual orders.           Signed Electronically by: Sreedhar Mendieta DO

## 2024-10-22 ENCOUNTER — TELEPHONE (OUTPATIENT)
Dept: INTERNAL MEDICINE | Facility: OTHER | Age: 74
End: 2024-10-22

## 2024-10-22 ENCOUNTER — TELEPHONE (OUTPATIENT)
Dept: EDUCATION SERVICES | Facility: HOSPITAL | Age: 74
End: 2024-10-22

## 2024-10-22 ENCOUNTER — MYC MEDICAL ADVICE (OUTPATIENT)
Dept: CARDIOLOGY | Facility: OTHER | Age: 74
End: 2024-10-22

## 2024-10-22 ENCOUNTER — TELEPHONE (OUTPATIENT)
Dept: CARDIOLOGY | Facility: OTHER | Age: 74
End: 2024-10-22

## 2024-10-22 DIAGNOSIS — I42.2 HYPERTROPHIC CARDIOMYOPATHY (H): ICD-10-CM

## 2024-10-22 DIAGNOSIS — E11.9 TYPE 2 DIABETES, HBA1C GOAL < 7% (H): Primary | ICD-10-CM

## 2024-10-22 DIAGNOSIS — I71.21 ANEURYSM OF ASCENDING AORTA WITHOUT RUPTURE (H): ICD-10-CM

## 2024-10-22 DIAGNOSIS — I51.7 RIGHT VENTRICULAR ENLARGEMENT: ICD-10-CM

## 2024-10-22 DIAGNOSIS — I51.89 DIASTOLIC DYSFUNCTION: ICD-10-CM

## 2024-10-22 DIAGNOSIS — R06.09 DOE (DYSPNEA ON EXERTION): Primary | ICD-10-CM

## 2024-10-22 NOTE — TELEPHONE ENCOUNTER
Patients daughter is calling wanting to schedule another telephone visit. Please call daughter back to get this scheduled if Winnielupillo Quispe is ok with this plan.[phone]

## 2024-10-22 NOTE — TELEPHONE ENCOUNTER
10:03 AM    Reason for Call: Phone Call    Description: Patient called in returning Samantha's phone call. Please call patient back    Was an appointment offered for this call? No  If yes : Appointment type              Date    Preferred method for responding to this message: Telephone Call  What is your phone number ? [phone]     If we cannot reach you directly, may we leave a detailed response at the number you provided? No , PATIENT WILL BE SITTING BY HIS PHONE     Can this message wait until your PCP/provider returns, if available today? Not applicable    Florencia Zaidi

## 2024-10-31 DIAGNOSIS — E11.65 POORLY CONTROLLED TYPE 2 DIABETES MELLITUS (H): ICD-10-CM

## 2024-10-31 NOTE — PROGRESS NOTES
[facility] HEART CARE   CARDIOLOGY PROGRESS NOTE     Chief Complaint   Patient presents with    Follow Up          Diagnosis:  1. TAAA.   -4.5 cm on 11/4/24.   -4.4 cm on 11/22/23  -4.4 cm on 5/20/22.   -4.5 cm on 5/3/21.   -4.45 cm on 1/7/19.   -4.39 cm on 1/13/20.  2.  HCM.   -1.3 cm on 11/4/24.   -1.3 cm on 5/20/22.  -2.2 cm on 1/7/19.   -2.0 cm on 1/13/20.   -1.3 CM on 5/20/22  3.  H/O tobacco abuse.   -Cigars quitting in April, 2018.    -He smoked approximate 7 days cigars on a daily basis.  4.  CHF diastolic.   -Indeterminate on 11/22/2023.   -Not assessable on 5/20/2022.   -Grade 1 on 1/13/2020.  -Grade 1 on 1/7/19.  5.  Hypertension-controled.  6.  LT-7-gwbuzrjeuaqw.  7.  CKD-3.  8.  H/O CVA in March, 2018 with what appears to be unsteady gait.  9.  Sedentary lifestyle.  10.  Morbid obesity      Assessment/Plan:    1.  Here for your follow-up.  Seen on 11/22/2023.  2.  TAAA: Stable ascending aortic aneurysm.  Slight increase from 4.4 cm to 4.5 cm on the last year.  Findings discussed.  Patient aware if he has significant chest discomfort, go to the ER.  Denying chest pain or chest   Pressure.  Plan for an echocardiogram in 2 years.  3.  HCM: Findings discussed.  Unchanged in size compared to 2022 at 1.3 cm.  No gradient or Sunny.  Findings discussed.  Will follow-up.  Patient prefers echocardiogram in 2 years.  Briefly discussed medications and surgery.  Patient strongly against surgery.  4.  CHF: Likely diastolic in nature.  Has mild swelling to his left lower extremity but no swelling to his right lower extremity.  No changes to his medications.  Continue on Jardiance, Lasix, lisinopril, and metoprolol.  5.  Hypertension: Controlled.  No changes.  Blood pressure today 108/62.  Continue amlodipine 10 mg daily, Lasix 20 mg daily, lisinopril 40 mg daily, metoprolol 50 mg twice a day, and Flomax 0.4 mg taking 2 tablets daily.  DM-2: Uncontrolled.  Care per primary.  Currently on Jardiance.  6.  Mobility  issues/obesity: Increase activity.  7.  Follow-up in 2 years after completion of echocardiogram or sooner with issues.    Interval history:  Above.      HPI:    He is known to have HCM with a septal thickness of 2.2 cm on 1/7/19, an ascending aortic aneurysm at 44.5 mm, and diastolic dysfunction grade 1.      He previously had an echo on 6/20/18 with a septal thickness of 2.0 cm and an TAAA of 4.4 cm.       On 1/7/19, his septum was 2.2 cm, had grade 1 diastolic dysfunction, and ascending aortic aneurysm at 44.5 cm.  He has a history of a CVA with ongoing unsteadiness of gait but no dizziness in March, 2018. He is to be a heavy smoker smoking up to 7-8  cigars on a daily basis. He reports he quit smoking in April, 2018. He has hypertension with a blood pressure of 171/91 today.  At home, his blood pressure is running in the 120's/80's regularly.  He is currently on lisinopril 40 mg daily and metoprolol 50 mg twice a day.  He has not had any lower extremity edema or chest pain.  With his history of diastolic dysfunction, it has been suggested he watch his salt intake, fluid intake, and weigh himself on a daily basis.      Relevant testing:  Echocardiogram on 10/22/2024:  Global and regional left ventricular function is normal with an EF of 60-65%.  Mild concentric wall thickening consistent with left ventricular hypertrophy  is present.  Left ventricular diastolic function is indeterminate.  Right ventricular function, chamber size, wall motion, and thickness are  normal.  The aortic root is moderately dilated to 4.5 cm. The ascending aorta is mildly  dilated to 4.4 cm.  Pulmonary artery systolic pressure cannot be assessed.    Echocardiogram on 11/22/2023:  Technically difficult study.Extremely poor acoustic windows.  Limited information was obtained during study.  Global and regional left ventricular function is normal with an EF of 60-65%.  Right ventricular function, chamber size, wall motion, and thickness  are  normal.  Pulmonary artery systolic pressure cannot be assessed.  Sinuses of Valsalva 4.3 cm.  Ascending aorta 4.4 cm.  The inferior vena cava cannot be assessed.  No pericardial effusion is present.  No significant changes noted.    ECHO on 5/20/22:  Global and regional left ventricular function is normal with an EF of 60-65%.  Global right ventricular function is normal.  Aortic root and ascending aorta size both normal for the patient when indexed  to BSA.  Sinuses of Valsalva 4.4 cm.  Ascending aorta 4.4 cm.  This study was compared with the study from 5/2021 .  No significant changes noted.    ECHO on 5/3/21:  Global and regional left ventricular function is normal with an EF of 60-65%.  The right ventricle is normal size.  Global right ventricular function is normal.  Mild to moderate left atrial enlargement is present.  Trace mitral insufficiency is present.  Ao valve leaflets not well visualized  Trace tricuspid insufficiency is present.  Ascending aorta 4.3 cm.  Aortic root 4.5 cm        ICD-10-CM    1. Hypertrophic cardiomyopathy (H)  I42.2       2. Diastolic dysfunction  I51.89       3. Right ventricular enlargement  I51.7               Past Medical History:   Diagnosis Date    Altered gait 4/27/2018    Altered mental status - since approx 1/18 - delayed response, change in mentation 4/27/2018    Benign essential hypertension 4/27/2018    Chronic bilateral low back pain with sciatica 4/27/2018    Chronic fatigue 4/27/2018    Obesity 4/27/2018    Slurred speech x 3 months, CT neg ED [facility] 4/25/18 4/27/2018    Stroke (H)     Type 2 diabetes mellitus with complication, without long-term current use of insulin (H) 4/27/2018    Vision changes x 3 months as of 4/18 4/27/2018       No past surgical history on file.    No Known Allergies    Current Outpatient Medications   Medication Sig Dispense Refill    amLODIPine (NORVASC) 10 MG tablet Take 1 tablet (10 mg) by mouth daily. 90 tablet 3    aspirin (ASA)  81 MG chewable tablet CHEW AND SWALLOW 1 TABLET(81 MG) BY MOUTH DAILY 90 tablet 3    atorvastatin (LIPITOR) 40 MG tablet Take 1 tablet (40 mg) by mouth daily. 90 tablet 3    Cholecalciferol (VITAMIN D-3) 1000 units CAPS Take 1,000 Units by mouth 2 times daily       CINNAMON PO Take 1,000 mg by mouth daily       Continuous Blood Gluc  (FREESTYLE LILLIANA 2 READER) BRIANDA USE TO READ BLOOD SUGAR AS PER MANUFACTURERS INSTRUCTIONS 1 each 3    Continuous Glucose Sensor (FREESTYLE LILLIANA 2 SENSOR) MISC CHANGE SENSOR EVERY 14 DAYS 2 each 5    Cyanocobalamin (B-12) 1000 MCG TBCR Take by mouth.      empagliflozin (JARDIANCE) 10 MG TABS tablet Take 1 tablet (10 mg) by mouth daily. 90 tablet 3    furosemide (LASIX) 20 MG tablet Take 1 tablet (20 mg) by mouth daily. (Patient taking differently: Take 20 mg by mouth daily. Taking every other day) 90 tablet 3    gabapentin (NEURONTIN) 300 MG capsule Take 1 capsule (300 mg) by mouth 3 times daily. 90 capsule 3    Garlic 1000 MG CAPS Take by mouth daily       HEMP OIL OR EXTRACT OR OTHER CBD CANNABINOID, NOT MEDICAL CANNABIS,       lisinopril (ZESTRIL) 40 MG tablet Take 1 tablet (40 mg) by mouth daily. 90 tablet 3    metFORMIN (GLUCOPHAGE XR) 500 MG 24 hr tablet Take 2 tablets (1,000 mg) by mouth daily (with dinner). 180 tablet 3    metoprolol tartrate (LOPRESSOR) 50 MG tablet TAKE 1 TABLET(50 MG) BY MOUTH TWICE DAILY 180 tablet 2    order for DME Equipment being ordered: Stationary Bike 1 Device 0    tamsulosin (FLOMAX) 0.4 MG capsule TAKE 2 CAPSULES(0.8 MG) BY MOUTH DAILY 180 capsule 1    TURMERIC CURCUMIN PO Take 1,500 mg by mouth daily       zinc gluconate 50 MG tablet Take 50 mg by mouth daily.         Social History     Socioeconomic History    Marital status: Single     Spouse name: Not on file    Number of children: Not on file    Years of education: Not on file    Highest education level: Not on file   Occupational History    Not on file   Tobacco Use    Smoking status:  Light Smoker     Current packs/day: 0.00     Average packs/day: 0.5 packs/day for 15.4 years (7.7 ttl pk-yrs)     Types: Cigars, Cigarettes     Start date: 2003     Last attempt to quit: 2018     Years since quittin.3     Passive exposure: Current    Smokeless tobacco: Never   Vaping Use    Vaping status: Never Used   Substance and Sexual Activity    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 No   Social History Narrative    Not on file     Social Drivers of Health     Financial Resource Strain: Low Risk  (2023)    Financial Resource Strain     Within the past 12 months, have you or your family members you live with been unable to get utilities (heat, electricity) when it was really needed?: No   Food Insecurity: Low Risk  (2023)    Food Insecurity     Within the past 12 months, did you worry that your food would run out before you got money to buy more?: No     Within the past 12 months, did the food you bought just not last and you didn t have money to get more?: No   Transportation Needs: Low Risk  (2023)    Transportation Needs     Within the past 12 months, has lack of transportation kept you from medical appointments, getting your medicines, non-medical meetings or appointments, work, or from getting things that you need?: No   Physical Activity: Not on file   Stress: Not on file   Social Connections: Not on file   Interpersonal Safety: Not on file   Housing Stability: Low Risk  (2023)    Housing Stability     Do you have housing? : Yes     Are you worried about losing your housing?: No       LAB RESULTS:   No visits with results within 2 Month(s) from this visit.   Latest known visit with results is:   Office Visit on 2021   Component Date Value Ref Range Status    Cholesterol 2021 104  <200 mg/dL Final    Triglycerides 2021 150*  <150 mg/dL Final    HDL Cholesterol 02/02/2021 43  >39 mg/dL Final    LDL Cholesterol Calculated 02/02/2021 31  <100 mg/dL Final    Non HDL Cholesterol 02/02/2021 61  <130 mg/dL Final    Sodium 02/02/2021 136  133 - 144 mmol/L Final    Potassium 02/02/2021 4.9  3.4 - 5.3 mmol/L Final    Chloride 02/02/2021 107  94 - 109 mmol/L Final    Carbon Dioxide 02/02/2021 18* 20 - 32 mmol/L Final    Anion Gap 02/02/2021 11  3 - 14 mmol/L Final    Glucose 02/02/2021 184* 70 - 99 mg/dL Final    Urea Nitrogen 02/02/2021 35* 7 - 30 mg/dL Final    Creatinine 02/02/2021 1.76* 0.66 - 1.25 mg/dL Final    GFR Estimate 02/02/2021 38* >60 mL/min/[1.73_m2] Final    GFR Estimate If Black 02/02/2021 45* >60 mL/min/[1.73_m2] Final    Calcium 02/02/2021 10.1  8.5 - 10.1 mg/dL Final    Bilirubin Total 02/02/2021 0.6  0.2 - 1.3 mg/dL Final    Albumin 02/02/2021 4.0  3.4 - 5.0 g/dL Final    Protein Total 02/02/2021 7.4  6.8 - 8.8 g/dL Final    Alkaline Phosphatase 02/02/2021 85  40 - 150 U/L Final    ALT 02/02/2021 42  0 - 70 U/L Final    AST 02/02/2021 13  0 - 45 U/L Final    WBC 02/02/2021 8.9  4.0 - 11.0 10e9/L Final    RBC Count 02/02/2021 4.96  4.4 - 5.9 10e12/L Final    Hemoglobin 02/02/2021 15.8  13.3 - 17.7 g/dL Final    Hematocrit 02/02/2021 45.2  40.0 - 53.0 % Final    MCV 02/02/2021 91  78 - 100 fl Final    MCH 02/02/2021 31.9  26.5 - 33.0 pg Final    MCHC 02/02/2021 35.0  31.5 - 36.5 g/dL Final    RDW 02/02/2021 12.5  10.0 - 15.0 % Final    Platelet Count 02/02/2021 173  150 - 450 10e9/L Final    % Neutrophils 02/02/2021 65.2  % Final    % Lymphocytes 02/02/2021 26.0  % Final    % Monocytes 02/02/2021 6.8  % Final    % Eosinophils 02/02/2021 1.5  % Final    % Basophils 02/02/2021 0.5  % Final    Absolute Neutrophil 02/02/2021 5.8  1.6 - 8.3 10e9/L Final    Absolute Lymphocytes 02/02/2021 2.3  0.8 - 5.3 10e9/L Final    Absolute Monocytes 02/02/2021 0.6  0.0 - 1.3 10e9/L Final    Absolute Eosinophils 02/02/2021 0.1  0.0 - 0.7 10e9/L  "Final    Absolute Basophils 02/02/2021 0.0  0.0 - 0.2 10e9/L Final    Diff Method 02/02/2021 Automated Method   Final    Hemoglobin A1C 02/02/2021 7.4* 0 - 5.6 % Final    Estimated Average Glucose 02/02/2021 166  mg/dL Final        Review of systems: Negative except that which was noted in the HPI.    Physical examination:  /62   Pulse 79   Resp 16   Ht 1.88 m (6' 2\")   Wt 138.3 kg (305 lb)   SpO2 98%   BMI 39.16 kg/m      GENERAL APPEARANCE: healthy, alert and no distress  CHEST: lungs clear to auscultation.  CARDIOVASCULAR: regular rhythm, normal S1 with physiologic split S2, no S3 or S4 and no murmur, click or rub  EXTREMITIES: no clubbing, cyanosis with mild to moderate peripheral edema.  Left worse than right..    Total time spent on day of visit, including review of tests, obtaining/reviewing separately obtained history, ordering medications/tests/procedures, communicating with PCP/consultants, and documenting in electronic medical record: 31 minutes.           Thank you for allowing me to participate in the care of your patient. Please do not hesitate to contact me if you have any questions.     Chas Armendariz, DO          "

## 2024-10-31 NOTE — TELEPHONE ENCOUNTER
FREESTYLE LILLIANA 2 SENSOR         Last Written Prescription Date:  8/12/24  Last Fill Quantity: 2,   # refills: 2  Last Office Visit: 10/21/24  Future Office visit:    Next 5 appointments (look out 90 days)      Nov 04, 2024 10:30 AM  (Arrive by 10:15 AM)  Return Visit with Chas Armendariz DO  [facility] [address]  [phone]     Nov 06, 2024 9:30 AM  (Arrive by 9:15 AM)  Return Visit with Leonie Og DPM  [facility] ([facility] ) [address] [place] [id number] [phone]             Routing refill request to provider for review/approval because:  Drug not on the [facility], [facility] or [facility] refill protocol or controlled substance

## 2024-11-04 ENCOUNTER — OFFICE VISIT (OUTPATIENT)
Dept: CARDIOLOGY | Facility: OTHER | Age: 74
End: 2024-11-04
Attending: INTERNAL MEDICINE
Payer: COMMERCIAL

## 2024-11-04 ENCOUNTER — ALLIED HEALTH/NURSE VISIT (OUTPATIENT)
Dept: EDUCATION SERVICES | Facility: OTHER | Age: 74
End: 2024-11-04
Attending: INTERNAL MEDICINE
Payer: COMMERCIAL

## 2024-11-04 ENCOUNTER — HOSPITAL ENCOUNTER (OUTPATIENT)
Dept: CARDIOLOGY | Facility: HOSPITAL | Age: 74
Discharge: HOME | End: 2024-11-04
Attending: INTERNAL MEDICINE
Payer: COMMERCIAL

## 2024-11-04 VITALS
WEIGHT: 305 LBS | HEIGHT: 74 IN | DIASTOLIC BLOOD PRESSURE: 62 MMHG | SYSTOLIC BLOOD PRESSURE: 108 MMHG | BODY MASS INDEX: 39.14 KG/M2 | HEART RATE: 79 BPM | RESPIRATION RATE: 16 BRPM | OXYGEN SATURATION: 98 %

## 2024-11-04 DIAGNOSIS — I51.89 DIASTOLIC DYSFUNCTION: ICD-10-CM

## 2024-11-04 DIAGNOSIS — E11.8 TYPE 2 DIABETES MELLITUS WITH COMPLICATION, WITHOUT LONG-TERM CURRENT USE OF INSULIN (H): Primary | ICD-10-CM

## 2024-11-04 DIAGNOSIS — I71.21 ANEURYSM OF ASCENDING AORTA WITHOUT RUPTURE (H): ICD-10-CM

## 2024-11-04 DIAGNOSIS — I51.7 RIGHT VENTRICULAR ENLARGEMENT: ICD-10-CM

## 2024-11-04 DIAGNOSIS — R06.09 DOE (DYSPNEA ON EXERTION): ICD-10-CM

## 2024-11-04 DIAGNOSIS — I42.2 HYPERTROPHIC CARDIOMYOPATHY (H): Primary | ICD-10-CM

## 2024-11-04 DIAGNOSIS — I42.2 HYPERTROPHIC CARDIOMYOPATHY (H): ICD-10-CM

## 2024-11-04 LAB — LVEF ECHO: NORMAL

## 2024-11-04 PROCEDURE — 99214 OFFICE O/P EST MOD 30 MIN: CPT | Performed by: INTERNAL MEDICINE

## 2024-11-04 PROCEDURE — 93306 TTE W/DOPPLER COMPLETE: CPT

## 2024-11-04 PROCEDURE — G0463 HOSPITAL OUTPT CLINIC VISIT: HCPCS | Mod: 25

## 2024-11-04 ASSESSMENT — PAIN SCALES - GENERAL: PAINLEVEL_OUTOF10: NO PAIN (0)

## 2024-11-04 NOTE — PATIENT INSTRUCTIONS
Thank you for allowing Dr MARCELINO Armendariz and our  team to participate in your care. Please call our office at [phone] with scheduling questions or if you need to cancel or change your appointment. With any other questions or concerns you may call cardiology nurse at  [phone].       If you experience chest pain, chest pressure, chest tightness, shortness of breath, fainting, lightheadedness, nausea, vomiting, or other concerning symptoms, please report to the Emergency Department or call 911. These symptoms may be emergent, and best treated in the Emergency Department.

## 2024-11-04 NOTE — PROGRESS NOTES
Pt came in for a Shaji download today for appt on 11/06/24. Download was completed and given to Winnie Quispe.    Laura Tillman LPN

## 2024-11-06 ENCOUNTER — HOSPITAL ENCOUNTER (OUTPATIENT)
Dept: EDUCATION SERVICES | Facility: HOSPITAL | Age: 74
Discharge: HOME | End: 2024-11-06
Attending: INTERNAL MEDICINE | Admitting: INTERNAL MEDICINE
Payer: COMMERCIAL

## 2024-11-06 DIAGNOSIS — E11.65 TYPE 2 DIABETES MELLITUS WITH HYPERGLYCEMIA, WITHOUT LONG-TERM CURRENT USE OF INSULIN (H): Primary | ICD-10-CM

## 2024-11-06 PROCEDURE — 97803 MED NUTRITION INDIV SUBSEQ: CPT | Mod: GT,95 | Performed by: DIETITIAN, REGISTERED

## 2024-11-06 ASSESSMENT — PAIN SCALES - GENERAL: PAINLEVEL_OUTOF10: NO PAIN (0)

## 2024-11-06 NOTE — LETTER
11/6/2024      Deon Culver  [address]  [facility] [address]        Diabetes Self-Management Education & Support    Presents for: Individual review    Type of service:  Video Visit    Originating Location (pt. Location): Home  Distant Location (provider location): [facility]  Mode of Communication:  Video Conference via [employer]    Video Start Time:  8:17 am  Video End Time (time video stopped): 8:41 am    How would patient like to obtain AVS? MyChart    ASSESSMENT:  Recent A1c 8.4% which is down from previous A1c of 10.9% November 2023 but not yet to target.  Pt is unable to attend face to face appointment r/t mobility issues.  His daughter did bring in Freestyle Shaji 2 for download prior to visit.     Freestyle Shaji 2 AGP Report  10/21/2024 to 11/03/2024    % Time CGM is Active 85%    Average Glucose  195    Glucose Management Indicator  (GMI)    8.0%    Glucose Variabilty  25.5%    Time in Ranges:  >250 mg/dL   15%  181-250 mg/dL  34%   mg/dL   51%  54-69 mg/dL   0%  <54 mg/dL   0%     Discussion notes pt does make efforts to limit foods high in carbohydrates.  He is unable to exercise r/t mobility issues.  Compliant with taking diabetes medications as prescribed.      Patient's most recent   Lab Results   Component Value Date    A1C 8.4 10/21/2024    A1C 7.5 07/07/2021     is not meeting goal of <7.5%.     Diabetes knowledge and skills assessment:   Patient is knowledgeable in diabetes management concepts related to: Monitoring and Taking Medication    Education today focused on the following diabetes management concepts: Healthy Eating and Taking Medication    Based on learning assessment above, most appropriate setting for further diabetes education would be: Individual setting.      PLAN  Continue to work on healthy food choices.  Information mailed to patient for review.   Keep using Shaji 2 for monitoring.   Continue current dose of Metformin XR.  Increase Jardiance to 25  "mg daily.  Discussed with Jocelyn Yarbrough CNP.  Pt agreeable.   Download of Shaji 2 again mid-December to see if increasing Jardiance is effective in lowering glucose levels.      Follow-up: Mid December - Shaji 2 download    See Care Plan for co-developed, patient-state behavior change goals.  AVS provided for patient today.    Education Materials Provided:  Planning Healthy Meals    SUBJECTIVE/OBJECTIVE:  Presents for: Individual review  Accompanied by: Self, Daughter  Diabetes education in the past 24mo: (Patient-Rptd) Yes  Focus of Visit: Assistance w/ making life changes, Healthy Eating, Taking Medication  Diabetes type: (Patient-Rptd) Type 2  Date of diagnosis: Unknown  Disease course: Improving  How confident are you filling out medical forms by yourself:: (Patient-Rptd) Extremely  Diabetes management related comments/concerns: None  Transportation concerns: No  Difficulty affording diabetes medication?: No  Difficulty affording diabetes testing supplies?: No  Other concerns:: Physical impairment (Pt has mobility issues so difficult to attend appointments.)  Cultural Influences/Ethnic Background:  Not  or     Diabetes Symptoms & Complications:  Diabetes Related Symptoms: (Patient-Rptd) None  Weight trend: Decreasing (Pt states at one time he weighed 343#.  Weight last year documented at 315#.  Recent clinic appointments 305#.)  Symptom course: (Patient-Rptd) Stable  Disease course: Improving  Complications assessed today?: Yes  CVA: Yes  Heart disease: Yes (CHF)  Nephropathy: Yes (CKD3A)    Patient Problem List and Family Medical History reviewed for relevant medical history, current medical status, and diabetes risk factors.    Vitals:  There were no vitals taken for this visit.  Estimated body mass index is 39.16 kg/m  as calculated from the following:    Height as of 11/4/24: 1.88 m (6' 2\").    Weight as of 11/4/24: 138.3 kg (305 lb).   Last 3 BP:   BP Readings from Last 3 Encounters: " "  11/04/24 108/62   12/06/23 135/75   11/28/23 128/78       History   Smoking Status     Light Smoker     Types: Cigars, Cigarettes   Smokeless Tobacco     Never       Labs:  Lab Results   Component Value Date    A1C 8.4 10/21/2024    A1C 7.5 07/07/2021     Lab Results   Component Value Date     10/21/2024     04/11/2022     07/07/2021     Lab Results   Component Value Date    LDL 46 10/21/2024    LDL 31 02/02/2021     HDL Cholesterol   Date Value Ref Range Status   02/02/2021 43 >39 mg/dL Final     Direct Measure HDL   Date Value Ref Range Status   10/21/2024 39 (L) >=40 mg/dL Final   ]  GFR Estimate   Date Value Ref Range Status   10/21/2024 39 (L) >60 mL/min/1.73m2 Final     Comment:     eGFR calculated using 2021 CKD-EPI equation.   07/07/2021 39 (L) >60 mL/min/[1.73_m2] Final     Comment:     Non  GFR Calc  Starting 12/18/2018, serum creatinine based estimated GFR (eGFR) will be   calculated using the Chronic Kidney Disease Epidemiology Collaboration   (CKD-EPI) equation.       GFR Estimate If Black   Date Value Ref Range Status   07/07/2021 45 (L) >60 mL/min/[1.73_m2] Final     Comment:      GFR Calc  Starting 12/18/2018, serum creatinine based estimated GFR (eGFR) will be   calculated using the Chronic Kidney Disease Epidemiology Collaboration   (CKD-EPI) equation.       Lab Results   Component Value Date    CR 1.82 10/21/2024    CR 1.73 07/07/2021     No results found for: \"MICROALBUMIN\"    Healthy Eating:  Healthy Eating Assessed Today: Yes  Cultural/Taoist diet restrictions?: (Patient-Rptd) No  Do you have any food allergies or intolerances?: No  Meal planning/habits: Low carb  Who cooks/prepares meals for you?: Significant other  Who purchases food in  your home?: Self, Significant other (Orders food online from [employer])  How many times a week on average do you eat food made away from home (restaurant/take-out)?: (Patient-Rptd) 0  Meals include: " "(Patient-Rptd) Other  Breakfast: 5 sausage links, 3 eggs, coffee  Lunch: none  Dinner: hot wings, salad, peas - occasional starch  Snacks: Minimal snacking - some sugar free licorice, Atkins peanut butter cups  Beverages: Water, Coffee, Diet soda (Pt drinks ICE sparkling water.)  Has patient met with a dietitian in the past?: Yes    Being Active:  Being Active Assessed Today: Yes  Exercise:: Unable to exercise  Barrier to exercise: Physical limitation (\"legs are shot\"  walks with a walker)    Monitoring:  Monitoring Assessed Today: Yes  Did patient bring glucose meter to appointment? : Yes  Blood Glucose Meter: CGM (Shaji 2)  Times checking blood sugar at home (number): (Patient-Rptd) 5+  Times checking blood sugar at home (per): (Patient-Rptd) Day  See above    Taking Medications:  Diabetes Medication(s)       Biguanides       metFORMIN (GLUCOPHAGE XR) 500 MG 24 hr tablet Take 2 tablets (1,000 mg) by mouth daily (with dinner).       Sodium-Glucose Co-Transporter 2 (SGLT2) Inhibitors       empagliflozin (JARDIANCE) 10 MG TABS tablet Take 1 tablet (10 mg) by mouth daily.            Taking Medication Assessed Today: Yes  Current Treatments: Diet, Oral Medication (taken by mouth) (Metformin XR 2-500 mg tabs dinner, Jardiance 10 mg am)  Problems taking diabetes medications regularly?: No  Diabetes medication side effects?: No    Problem Solving:  Problem Solving Assessed Today: Yes  Is the patient at risk for hypoglycemia?: No    Reducing Risks:  Reducing Risks Assessed Today: Yes  Diabetes Risks: Age over 45 years, Sedentary Lifestyle  CAD Risks: Diabetes Mellitus, Male sex, Obesity, Sedentary lifestyle, Tobacco exposure  Has dilated eye exam at least once a year?: (Patient-Rptd) No  Sees dentist every 6 months?: (Patient-Rptd) No  Feet checked by healthcare provider in the last year?: (Patient-Rptd) Yes    Healthy Coping:  Healthy Coping Assessed Today: Yes  Emotional response to diabetes: Acceptance, Concern for " health and well-being  Informal Support system:: (Patient-Rptd) Family  Stage of change: MAINTENANCE (Working to maintain change, with risk of relapse)  Patient Activation Measure Survey Score:      6/14/2018    10:00 AM 7/25/2018     2:00 PM   DARA Score (Last Two)   DARA Raw Score 37 40   Activation Score 79.2 100   DARA Level 4 4       Time Spent: 24 minutes  Encounter Type: Individual    Any diabetes medication dose changes were made via the CDE Protocol per the patient's referring provider. A copy of this encounter was shared with the provider.       Sincerely,        Winnie Quispe RD

## 2024-11-06 NOTE — PATIENT INSTRUCTIONS
-Information on planning healthy meals mailed to patient.  Limit carbohydrates to 60 grams/meal, 15-30 grams/snack.  -Keep using Shaji 2 for monitoring.  Scan glucose levels frequently.  Target range is .  We would like at least 70% of your numbers to be in that range.   -Continue current dose of Metformin.  -Increase Jardiance to 25 mg daily.  Okay to take 2- 10 mg tabs to use up supply.  Make sure adequate fluids are consumed with use of Jardiance.   -Eye exam is due - schedule when able.   -Please have your daughter bring your Shaji 2 in for a download mid December.  She should call or My Chart prior to coming so we know when about she will arrive.    -I will call you after I review download.   -Please call with concerns or questions.  ANNIE Andres, [facility] [phone].

## 2024-11-06 NOTE — PROGRESS NOTES
Diabetes Self-Management Education & Support    Presents for: Individual review    Type of service:  Video Visit    Originating Location (pt. Location): Home  Distant Location (provider location): [facility]  Mode of Communication:  Video Conference via [employer]    Video Start Time:  8:17 am  Video End Time (time video stopped): 8:41 am    How would patient like to obtain AVS? MyChart    ASSESSMENT:  Recent A1c 8.4% which is down from previous A1c of 10.9% November 2023 but not yet to target.  Pt is unable to attend face to face appointment r/t mobility issues.  His daughter did bring in Freestyle Shaji 2 for download prior to visit.     Freestyle Shaji 2 AGP Report  10/21/2024 to 11/03/2024    % Time CGM is Active 85%    Average Glucose  195    Glucose Management Indicator  (GMI)    8.0%    Glucose Variabilty  25.5%    Time in Ranges:  >250 mg/dL   15%  181-250 mg/dL  34%   mg/dL   51%  54-69 mg/dL   0%  <54 mg/dL   0%     Discussion notes pt does make efforts to limit foods high in carbohydrates.  He is unable to exercise r/t mobility issues.  Compliant with taking diabetes medications as prescribed.      Patient's most recent   Lab Results   Component Value Date    A1C 8.4 10/21/2024    A1C 7.5 07/07/2021     is not meeting goal of <7.5%.     Diabetes knowledge and skills assessment:   Patient is knowledgeable in diabetes management concepts related to: Monitoring and Taking Medication    Education today focused on the following diabetes management concepts: Healthy Eating and Taking Medication    Based on learning assessment above, most appropriate setting for further diabetes education would be: Individual setting.      PLAN  Continue to work on healthy food choices.  Information mailed to patient for review.   Keep using Shaji 2 for monitoring.   Continue current dose of Metformin XR.  Increase Jardiance to 25 mg daily.  Discussed with Jocelyn Yarbrough CNP.  Pt agreeable.   Download of  "Shaji 2 again mid-December to see if increasing Jardiance is effective in lowering glucose levels.      Follow-up: Mid December - Shaji 2 download    See Care Plan for co-developed, patient-state behavior change goals.  AVS provided for patient today.    Education Materials Provided:  Planning Healthy Meals    SUBJECTIVE/OBJECTIVE:  Presents for: Individual review  Accompanied by: Self, Daughter  Diabetes education in the past 24mo: (Patient-Rptd) Yes  Focus of Visit: Assistance w/ making life changes, Healthy Eating, Taking Medication  Diabetes type: (Patient-Rptd) Type 2  Date of diagnosis: Unknown  Disease course: Improving  How confident are you filling out medical forms by yourself:: (Patient-Rptd) Extremely  Diabetes management related comments/concerns: None  Transportation concerns: No  Difficulty affording diabetes medication?: No  Difficulty affording diabetes testing supplies?: No  Other concerns:: Physical impairment (Pt has mobility issues so difficult to attend appointments.)  Cultural Influences/Ethnic Background:  Not  or     Diabetes Symptoms & Complications:  Diabetes Related Symptoms: (Patient-Rptd) None  Weight trend: Decreasing (Pt states at one time he weighed 343#.  Weight last year documented at 315#.  Recent clinic appointments 305#.)  Symptom course: (Patient-Rptd) Stable  Disease course: Improving  Complications assessed today?: Yes  CVA: Yes  Heart disease: Yes (CHF)  Nephropathy: Yes (CKD3A)    Patient Problem List and Family Medical History reviewed for relevant medical history, current medical status, and diabetes risk factors.    Vitals:  There were no vitals taken for this visit.  Estimated body mass index is 39.16 kg/m  as calculated from the following:    Height as of 11/4/24: 1.88 m (6' 2\").    Weight as of 11/4/24: 138.3 kg (305 lb).   Last 3 BP:   BP Readings from Last 3 Encounters:   11/04/24 108/62   12/06/23 135/75   11/28/23 128/78       History   Smoking " "Status    Light Smoker    Types: Cigars, Cigarettes   Smokeless Tobacco    Never       Labs:  Lab Results   Component Value Date    A1C 8.4 10/21/2024    A1C 7.5 07/07/2021     Lab Results   Component Value Date     10/21/2024     04/11/2022     07/07/2021     Lab Results   Component Value Date    LDL 46 10/21/2024    LDL 31 02/02/2021     HDL Cholesterol   Date Value Ref Range Status   02/02/2021 43 >39 mg/dL Final     Direct Measure HDL   Date Value Ref Range Status   10/21/2024 39 (L) >=40 mg/dL Final   ]  GFR Estimate   Date Value Ref Range Status   10/21/2024 39 (L) >60 mL/min/1.73m2 Final     Comment:     eGFR calculated using 2021 CKD-EPI equation.   07/07/2021 39 (L) >60 mL/min/[1.73_m2] Final     Comment:     Non  GFR Calc  Starting 12/18/2018, serum creatinine based estimated GFR (eGFR) will be   calculated using the Chronic Kidney Disease Epidemiology Collaboration   (CKD-EPI) equation.       GFR Estimate If Black   Date Value Ref Range Status   07/07/2021 45 (L) >60 mL/min/[1.73_m2] Final     Comment:      GFR Calc  Starting 12/18/2018, serum creatinine based estimated GFR (eGFR) will be   calculated using the Chronic Kidney Disease Epidemiology Collaboration   (CKD-EPI) equation.       Lab Results   Component Value Date    CR 1.82 10/21/2024    CR 1.73 07/07/2021     No results found for: \"MICROALBUMIN\"    Healthy Eating:  Healthy Eating Assessed Today: Yes  Cultural/Sabianism diet restrictions?: (Patient-Rptd) No  Do you have any food allergies or intolerances?: No  Meal planning/habits: Low carb  Who cooks/prepares meals for you?: Significant other  Who purchases food in  your home?: Self, Significant other (Orders food online from [employer])  How many times a week on average do you eat food made away from home (restaurant/take-out)?: (Patient-Rptd) 0  Meals include: (Patient-Rptd) Other  Breakfast: 5 sausage links, 3 eggs, coffee  Lunch: " "none  Dinner: hot wings, salad, peas - occasional starch  Snacks: Minimal snacking - some sugar free licorice, Atkins peanut butter cups  Beverages: Water, Coffee, Diet soda (Pt drinks ICE sparkling water.)  Has patient met with a dietitian in the past?: Yes    Being Active:  Being Active Assessed Today: Yes  Exercise:: Unable to exercise  Barrier to exercise: Physical limitation (\"legs are shot\"  walks with a walker)    Monitoring:  Monitoring Assessed Today: Yes  Did patient bring glucose meter to appointment? : Yes  Blood Glucose Meter: CGM (Shaji 2)  Times checking blood sugar at home (number): (Patient-Rptd) 5+  Times checking blood sugar at home (per): (Patient-Rptd) Day  See above    Taking Medications:  Diabetes Medication(s)       Biguanides       metFORMIN (GLUCOPHAGE XR) 500 MG 24 hr tablet Take 2 tablets (1,000 mg) by mouth daily (with dinner).       Sodium-Glucose Co-Transporter 2 (SGLT2) Inhibitors       empagliflozin (JARDIANCE) 10 MG TABS tablet Take 1 tablet (10 mg) by mouth daily.            Taking Medication Assessed Today: Yes  Current Treatments: Diet, Oral Medication (taken by mouth) (Metformin XR 2-500 mg tabs dinner, Jardiance 10 mg am)  Problems taking diabetes medications regularly?: No  Diabetes medication side effects?: No    Problem Solving:  Problem Solving Assessed Today: Yes  Is the patient at risk for hypoglycemia?: No    Reducing Risks:  Reducing Risks Assessed Today: Yes  Diabetes Risks: Age over 45 years, Sedentary Lifestyle  CAD Risks: Diabetes Mellitus, Male sex, Obesity, Sedentary lifestyle, Tobacco exposure  Has dilated eye exam at least once a year?: (Patient-Rptd) No  Sees dentist every 6 months?: (Patient-Rptd) No  Feet checked by healthcare provider in the last year?: (Patient-Rptd) Yes    Healthy Coping:  Healthy Coping Assessed Today: Yes  Emotional response to diabetes: Acceptance, Concern for health and well-being  Informal Support system:: (Patient-Rptd) Family  Stage " of change: MAINTENANCE (Working to maintain change, with risk of relapse)  Patient Activation Measure Survey Score:      6/14/2018    10:00 AM 7/25/2018     2:00 PM   DARA Score (Last Two)   DARA Raw Score 37 40   Activation Score 79.2 100   DARA Level 4 4       Time Spent: 24 minutes  Encounter Type: Individual    Any diabetes medication dose changes were made via the CDE Protocol per the patient's referring provider. A copy of this encounter was shared with the provider.

## 2024-11-09 ENCOUNTER — TELEPHONE (OUTPATIENT)
Dept: EDUCATION SERVICES | Facility: HOSPITAL | Age: 74
End: 2024-11-09

## 2024-11-09 NOTE — TELEPHONE ENCOUNTER
Telephone call  Patient calling he was taking Jardiance and they increased it to 20 mg and they he had a appointment with Atiya Quispe RD on 11/6/2024 and she recommended a increase of the med to 25 mg.  He has been taking the 20 mg for 2 days and toady he had a blood sugar of 55 in the morning today and he is concerned that the 25 mg is to much. Attempted to page on call  for the clinic but was the ED was not sure how to get in touch.  Routing as a high level message to the clinic. He is going to wait till Monday and talk with the clinic before starting the 25 mg Jardiance. If his blood sugar drops again he will go in to be evaluated at urgent care    Arleen Ken RN   [facility] Nurse Advisor  8:49 AM 11/9/2024

## 2024-11-11 NOTE — TELEPHONE ENCOUNTER
Phone message received from pt his Jardiance was adjusted to 20mg and his Shaji was alarming with a BG of 55. He would like to discuss this with Winnie Quispe.

## 2024-11-12 ENCOUNTER — TELEPHONE (OUTPATIENT)
Dept: EDUCATION SERVICES | Facility: HOSPITAL | Age: 74
End: 2024-11-12

## 2024-11-12 NOTE — TELEPHONE ENCOUNTER
Spoke with patient.  He states that on the weekend he laid down for a nap and his alarm on his Shaji went off and glucose read 55.  He denies any symptoms of hypoglycemia.  He did get up and drink juice/eat.  Discussed using a glucose meter to check when/if he gets a low alarm but he does not have a glucose meter and is not interested in getting one.  He had no further lows other than the one.  This may have been Shaji error.  Pt questions if he should increase his Jardiance to the 25 mg we discussed on recent video visit.  He has been taking 2-10 mg tabs (up from 1- 10 mg tab).  I encouraged him to use of his supply of 10 mg tabs (take 2-10 mg as current) and then transition to 25 mg dose.  Instructed him to call with any further concerns about low glucose levels.

## 2024-11-12 NOTE — TELEPHONE ENCOUNTER
Patient left voicemail that his Shaji system was alerting him with a 55. Has issue with Jardiance prescription.     Wants to speak with Winnie.    [phone]

## 2025-01-04 DIAGNOSIS — N40.0 BENIGN PROSTATIC HYPERPLASIA, UNSPECIFIED WHETHER LOWER URINARY TRACT SYMPTOMS PRESENT: ICD-10-CM

## 2025-01-04 DIAGNOSIS — I10 BENIGN ESSENTIAL HYPERTENSION: ICD-10-CM

## 2025-01-06 ENCOUNTER — HOSPITAL ENCOUNTER (OUTPATIENT)
Dept: EDUCATION SERVICES | Facility: HOSPITAL | Age: 75
Discharge: HOME | End: 2025-01-06
Attending: DIETITIAN, REGISTERED | Admitting: DIETITIAN, REGISTERED
Payer: COMMERCIAL

## 2025-01-06 VITALS — BODY MASS INDEX: 36.98 KG/M2 | WEIGHT: 288 LBS

## 2025-01-06 DIAGNOSIS — E11.9 TYPE 2 DIABETES MELLITUS WITHOUT COMPLICATION, WITHOUT LONG-TERM CURRENT USE OF INSULIN (H): Primary | ICD-10-CM

## 2025-01-06 PROCEDURE — 97803 MED NUTRITION INDIV SUBSEQ: CPT | Mod: TEL,95 | Performed by: DIETITIAN, REGISTERED

## 2025-01-06 RX ORDER — TAMSULOSIN HYDROCHLORIDE 0.4 MG/1
0.8 CAPSULE ORAL DAILY
Qty: 180 CAPSULE | Refills: 1 | Status: SHIPPED | OUTPATIENT
Start: 2025-01-06

## 2025-01-06 ASSESSMENT — PAIN SCALES - GENERAL: PAINLEVEL_OUTOF10: NO PAIN (0)

## 2025-01-06 NOTE — PATIENT INSTRUCTIONS
-Continue current dose of Jardiance and Metformin.   -A1c check at next provider visit.   -Continue to use [employer] 2 for monitoring.  Call with any concerns - highs/lows.  -ANNIE Adnres, [facility] [phone].

## 2025-01-06 NOTE — PROGRESS NOTES
Diabetes Self-Management Education & Support    Presents for: Individual review    Type of Service: Telephone Visit    Originating Location (Patient Location): Home  Distant Location (Provider Location): [facility]  Mode of Communication:  Telephone    Telephone Visit Start Time:  10:51 am  Telephone Visit End Time (telephone visit stop time): 11:01 am    How would patient like to obtain AVS? MyChart    Assessment  Follow up phone visit today regarding dose adjustment of Jardiance.  Jardiance was increased from 10 mg daily to 25 mg daily in November.  Pt reports no side effects and feels it is working well for him.  Pt's daughter brought in his Shaji 2 for download last week:    Freestyle Shaji 2 AGP Report  12/14/2024 to 12/27/2024    % Time CGM is Active 90%    Average Glucose  116    Glucose Management Indicator  (GMI)    6.1%    Glucose Variabilty  17%    Time in Ranges:  >250 mg/dL   0%  181-250 mg/dL  1%   mg/dL   99%  54-69 mg/dL   0%  <54 mg/dL   0%     A1c is due but due to mobility issues patient has difficulty attending clinic appointments.      Patient's most recent   Lab Results   Component Value Date    A1C 8.4 10/21/2024    A1C 7.5 07/07/2021     is not meeting goal of <7.0    Diabetes knowledge and skills assessment:   Patient is knowledgeable in diabetes management concepts related to: Healthy Eating, Taking Medication, and Healthy Coping    Based on learning assessment above, most appropriate setting for further diabetes education would be: Individual setting.    Care Plan and Education Provided:  Reducing Risks: Goal for A1c, how it relates to glucose and how often to check    Patient verbalized understanding of diabetes self-management education concepts discussed, opportunities for ongoing education and support, and recommendations provided today.    Plan  Pt will continue current meal planning habits, Shaji 2 use and current glucose lowering medications.   A1c at provider  "visit scheduled for June 2025.     Follow-up:  As needed.     See Care Plan for co-developed, patient-state behavior change goals.    Education Materials Provided:  No new materials today.     Subjective/Objective  Mendoza is an 74 year old year old, presenting for the following diabetes education related to: Presents for: Individual review  Accompanied by: Self  Diabetes education in the past 24mo: Yes  Focus of Visit: Assistance w/ making life changes, Taking Medication, Monitoring  Diabetes type: Type 2  Date of diagnosis: Unknown  Disease course: Improving  How confident are you filling out medical forms by yourself:: Extremely  Diabetes management related comments/concerns: None  Transportation concerns: No  Difficulty affording diabetes medication?: No  Difficulty affording diabetes testing supplies?: No  Other concerns:: Physical impairment (Pt has mobility issues so difficult to attend appointments.)  Cultural Influences/Ethnic Background:  Not  or     Diabetes Symptoms & Complications:  Diabetes Related Symptoms: None  Weight trend: Decreasing (Pt states at one time he weighed 343#.  Weight last year documented at 315#.  Most recent clinic appointments 305#.  Pt states today his weight at home was 288#.)  Symptom course: Stable  Disease course: Improving  Complications assessed today?: Yes  CVA: Yes  Heart disease: Yes (CHF)  Nephropathy: Yes (CKD3A)    Patient Problem List and Family Medical History reviewed for relevant medical history, current medical status, and diabetes risk factors.    Vitals:  Wt 130.6 kg (288 lb)   BMI 36.98 kg/m    Estimated body mass index is 36.98 kg/m  as calculated from the following:    Height as of 11/4/24: 1.88 m (6' 2\").    Weight as of this encounter: 130.6 kg (288 lb).   Last 3 BP:   BP Readings from Last 3 Encounters:   11/04/24 108/62   12/06/23 135/75   11/28/23 128/78       History   Smoking Status    Light Smoker    Types: Cigars, Cigarettes   Smokeless " "Tobacco    Never       Labs:  Lab Results   Component Value Date    A1C 8.4 10/21/2024    A1C 7.5 07/07/2021     Lab Results   Component Value Date     10/21/2024     04/11/2022     07/07/2021     Lab Results   Component Value Date    LDL 46 10/21/2024    LDL 31 02/02/2021     HDL Cholesterol   Date Value Ref Range Status   02/02/2021 43 >39 mg/dL Final     Direct Measure HDL   Date Value Ref Range Status   10/21/2024 39 (L) >=40 mg/dL Final   ]  GFR Estimate   Date Value Ref Range Status   10/21/2024 39 (L) >60 mL/min/1.73m2 Final     Comment:     eGFR calculated using 2021 CKD-EPI equation.   07/07/2021 39 (L) >60 mL/min/[1.73_m2] Final     Comment:     Non  GFR Calc  Starting 12/18/2018, serum creatinine based estimated GFR (eGFR) will be   calculated using the Chronic Kidney Disease Epidemiology Collaboration   (CKD-EPI) equation.       GFR Estimate If Black   Date Value Ref Range Status   07/07/2021 45 (L) >60 mL/min/[1.73_m2] Final     Comment:      GFR Calc  Starting 12/18/2018, serum creatinine based estimated GFR (eGFR) will be   calculated using the Chronic Kidney Disease Epidemiology Collaboration   (CKD-EPI) equation.       Lab Results   Component Value Date    CR 1.82 10/21/2024    CR 1.73 07/07/2021     No results found for: \"MICROALBUMIN\"    Healthy Eating:  Healthy Eating Assessed Today: Yes  Cultural/Sabianism diet restrictions?: No  Do you have any food allergies or intolerances?: No  Meal planning/habits: Low carb  Who cooks/prepares meals for you?: Significant other  Who purchases food in  your home?: Self, Significant other (Orders food online from [employer])  How many times a week on average do you eat food made away from home (restaurant/take-out)?: 0  Meals include: Other  Breakfast: 5 sausage links, 3 eggs, coffee  Lunch: none  Dinner: hot wings, salad, peas - occasional starch  Snacks: Minimal snacking - Atkins Shake  Beverages: Water, " Coffee, Diet soda  Has patient met with a dietitian in the past?: Yes      Taking Medications:  Diabetes Medication(s)       Biguanides       metFORMIN (GLUCOPHAGE XR) 500 MG 24 hr tablet Take 2 tablets (1,000 mg) by mouth daily (with dinner).       Sodium-Glucose Co-Transporter 2 (SGLT2) Inhibitors       empagliflozin (JARDIANCE) 25 MG TABS tablet Take 1 tablet (25 mg) by mouth daily.          Taking Medication Assessed Today: Yes  Current Treatments: Diet, Oral Medication (taken by mouth) (Metformin XR 2-500 mg tabs dinner, Jardiance 25 mg am)  Problems taking diabetes medications regularly?: No  Diabetes medication side effects?: No    Winnie Quispe RD  Time Spent: 10 minutes minutes  Encounter Type: Individual    Any diabetes medication dose changes were made via the CDCES Standing Orders under the patient's referring provider.

## 2025-01-06 NOTE — LETTER
1/6/2025      Deon Culver  [address]  [facility] [address]        Diabetes Self-Management Education & Support    Presents for: Individual review    Type of Service: Telephone Visit    Originating Location (Patient Location): Home  Distant Location (Provider Location): [facility]  Mode of Communication:  Telephone    Telephone Visit Start Time:  10:51 am  Telephone Visit End Time (telephone visit stop time): 11:01 am    How would patient like to obtain AVS? MyChart    Assessment  Follow up phone visit today regarding dose adjustment of Jardiance.  Jardiance was increased from 10 mg daily to 25 mg daily in November.  Pt reports no side effects and feels it is working well for him.  Pt's daughter brought in his Shaji 2 for download last week:    Freestyle Shaji 2 AGP Report  12/14/2024 to 12/27/2024    % Time CGM is Active 90%    Average Glucose  116    Glucose Management Indicator  (GMI)    6.1%    Glucose Variabilty  17%    Time in Ranges:  >250 mg/dL   0%  181-250 mg/dL  1%   mg/dL   99%  54-69 mg/dL   0%  <54 mg/dL   0%     A1c is due but due to mobility issues patient has difficulty attending clinic appointments.      Patient's most recent   Lab Results   Component Value Date    A1C 8.4 10/21/2024    A1C 7.5 07/07/2021     is not meeting goal of <7.0    Diabetes knowledge and skills assessment:   Patient is knowledgeable in diabetes management concepts related to: Healthy Eating, Taking Medication, and Healthy Coping    Based on learning assessment above, most appropriate setting for further diabetes education would be: Individual setting.    Care Plan and Education Provided:  Reducing Risks: Goal for A1c, how it relates to glucose and how often to check    Patient verbalized understanding of diabetes self-management education concepts discussed, opportunities for ongoing education and support, and recommendations provided today.    Plan  Pt will continue current meal planning habits,  "Shaji 2 use and current glucose lowering medications.   A1c at provider visit scheduled for June 2025.     Follow-up:  As needed.     See Care Plan for co-developed, patient-state behavior change goals.    Education Materials Provided:  No new materials today.     Subjective/Objective  Mendoza is an 74 year old year old, presenting for the following diabetes education related to: Presents for: Individual review  Accompanied by: Self  Diabetes education in the past 24mo: Yes  Focus of Visit: Assistance w/ making life changes, Taking Medication, Monitoring  Diabetes type: Type 2  Date of diagnosis: Unknown  Disease course: Improving  How confident are you filling out medical forms by yourself:: Extremely  Diabetes management related comments/concerns: None  Transportation concerns: No  Difficulty affording diabetes medication?: No  Difficulty affording diabetes testing supplies?: No  Other concerns:: Physical impairment (Pt has mobility issues so difficult to attend appointments.)  Cultural Influences/Ethnic Background:  Not  or     Diabetes Symptoms & Complications:  Diabetes Related Symptoms: None  Weight trend: Decreasing (Pt states at one time he weighed 343#.  Weight last year documented at 315#.  Most recent clinic appointments 305#.  Pt states today his weight at home was 288#.)  Symptom course: Stable  Disease course: Improving  Complications assessed today?: Yes  CVA: Yes  Heart disease: Yes (CHF)  Nephropathy: Yes (CKD3A)    Patient Problem List and Family Medical History reviewed for relevant medical history, current medical status, and diabetes risk factors.    Vitals:  Wt 130.6 kg (288 lb)   BMI 36.98 kg/m    Estimated body mass index is 36.98 kg/m  as calculated from the following:    Height as of 11/4/24: 1.88 m (6' 2\").    Weight as of this encounter: 130.6 kg (288 lb).   Last 3 BP:   BP Readings from Last 3 Encounters:   11/04/24 108/62   12/06/23 135/75   11/28/23 128/78       History " "  Smoking Status     Light Smoker     Types: Cigars, Cigarettes   Smokeless Tobacco     Never       Labs:  Lab Results   Component Value Date    A1C 8.4 10/21/2024    A1C 7.5 07/07/2021     Lab Results   Component Value Date     10/21/2024     04/11/2022     07/07/2021     Lab Results   Component Value Date    LDL 46 10/21/2024    LDL 31 02/02/2021     HDL Cholesterol   Date Value Ref Range Status   02/02/2021 43 >39 mg/dL Final     Direct Measure HDL   Date Value Ref Range Status   10/21/2024 39 (L) >=40 mg/dL Final   ]  GFR Estimate   Date Value Ref Range Status   10/21/2024 39 (L) >60 mL/min/1.73m2 Final     Comment:     eGFR calculated using 2021 CKD-EPI equation.   07/07/2021 39 (L) >60 mL/min/[1.73_m2] Final     Comment:     Non  GFR Calc  Starting 12/18/2018, serum creatinine based estimated GFR (eGFR) will be   calculated using the Chronic Kidney Disease Epidemiology Collaboration   (CKD-EPI) equation.       GFR Estimate If Black   Date Value Ref Range Status   07/07/2021 45 (L) >60 mL/min/[1.73_m2] Final     Comment:      GFR Calc  Starting 12/18/2018, serum creatinine based estimated GFR (eGFR) will be   calculated using the Chronic Kidney Disease Epidemiology Collaboration   (CKD-EPI) equation.       Lab Results   Component Value Date    CR 1.82 10/21/2024    CR 1.73 07/07/2021     No results found for: \"MICROALBUMIN\"    Healthy Eating:  Healthy Eating Assessed Today: Yes  Cultural/Samaritan diet restrictions?: No  Do you have any food allergies or intolerances?: No  Meal planning/habits: Low carb  Who cooks/prepares meals for you?: Significant other  Who purchases food in  your home?: Self, Significant other (Orders food online from [employer])  How many times a week on average do you eat food made away from home (restaurant/take-out)?: 0  Meals include: Other  Breakfast: 5 sausage links, 3 eggs, coffee  Lunch: none  Dinner: hot wings, salad, peas - " occasional starch  Snacks: Minimal snacking - Atkins Shake  Beverages: Water, Coffee, Diet soda  Has patient met with a dietitian in the past?: Yes      Taking Medications:  Diabetes Medication(s)       Biguanides       metFORMIN (GLUCOPHAGE XR) 500 MG 24 hr tablet Take 2 tablets (1,000 mg) by mouth daily (with dinner).       Sodium-Glucose Co-Transporter 2 (SGLT2) Inhibitors       empagliflozin (JARDIANCE) 25 MG TABS tablet Take 1 tablet (25 mg) by mouth daily.          Taking Medication Assessed Today: Yes  Current Treatments: Diet, Oral Medication (taken by mouth) (Metformin XR 2-500 mg tabs dinner, Jardiance 25 mg am)  Problems taking diabetes medications regularly?: No  Diabetes medication side effects?: No    Winnie Quispe RD  Time Spent: 10 minutes minutes  Encounter Type: Individual    Any diabetes medication dose changes were made via the CDCES Standing Orders under the patient's referring provider.       Sincerely,        Winnie Quispe RD    Electronically signed

## 2025-02-09 ENCOUNTER — HEALTH MAINTENANCE LETTER (OUTPATIENT)
Age: 75
End: 2025-02-09

## 2025-02-24 DIAGNOSIS — E11.8 TYPE 2 DIABETES MELLITUS WITH COMPLICATION, WITHOUT LONG-TERM CURRENT USE OF INSULIN (H): ICD-10-CM

## 2025-02-24 DIAGNOSIS — E11.65 POORLY CONTROLLED TYPE 2 DIABETES MELLITUS (H): ICD-10-CM

## 2025-02-24 DIAGNOSIS — I51.89 DIASTOLIC DYSFUNCTION: ICD-10-CM

## 2025-02-24 DIAGNOSIS — I10 BENIGN ESSENTIAL HYPERTENSION: ICD-10-CM

## 2025-02-24 DIAGNOSIS — I51.7 RIGHT VENTRICULAR ENLARGEMENT: ICD-10-CM

## 2025-02-24 DIAGNOSIS — N18.31 STAGE 3A CHRONIC KIDNEY DISEASE (H): ICD-10-CM

## 2025-02-24 RX ORDER — LISINOPRIL 40 MG/1
40 TABLET ORAL DAILY
Qty: 90 TABLET | Refills: 0 | OUTPATIENT
Start: 2025-02-24

## 2025-02-24 NOTE — TELEPHONE ENCOUNTER
Establish Care Appointment upcoming with Yvette Purvis CNP on 6/2/25.    LISINOPRIL 40MG TABLETS         Last Written Prescription Date:  10/21/24  Last Fill Quantity: 90,   # refills: 3  Last Office Visit:   Future Office visit:       Routing refill request to provider for review/approval because:  Refills on file.

## 2025-04-16 DIAGNOSIS — N18.31 STAGE 3A CHRONIC KIDNEY DISEASE (H): ICD-10-CM

## 2025-04-16 DIAGNOSIS — E11.8 TYPE 2 DIABETES MELLITUS WITH COMPLICATION, WITHOUT LONG-TERM CURRENT USE OF INSULIN (H): ICD-10-CM

## 2025-04-16 DIAGNOSIS — I10 BENIGN ESSENTIAL HYPERTENSION: ICD-10-CM

## 2025-04-16 DIAGNOSIS — I51.7 RIGHT VENTRICULAR ENLARGEMENT: ICD-10-CM

## 2025-04-16 DIAGNOSIS — I51.89 DIASTOLIC DYSFUNCTION: ICD-10-CM

## 2025-04-16 RX ORDER — LISINOPRIL 40 MG/1
40 TABLET ORAL DAILY
Qty: 90 TABLET | Refills: 3 | Status: SHIPPED | OUTPATIENT
Start: 2025-04-16

## 2025-04-16 NOTE — TELEPHONE ENCOUNTER
Requested Prescriptions   Pending Prescriptions Disp Refills    lisinopril (ZESTRIL) 40 MG tablet [Pharmacy Med Name: LISINOPRIL 40MG TABLETS] 90 tablet 3     Sig: TAKE 1 TABLET(40 MG) BY MOUTH DAILY       ACE Inhibitors (Including Combos) Protocol Passed - 4/16/2025 11:52 AM        Passed - Most recent blood pressure under 140/90 in past 12 months- Clinicial or Patient Reported     BP Readings from Last 3 Encounters:   11/04/24 108/62   12/06/23 135/75   11/28/23 128/78       No data recorded            Passed - Medication is active on med list and the sig matches. RN to manually verify dose and sig if red X/fail.     If the protocol passes (green check), you do not need to verify med dose and sig.    A prescription matches if they are the same clinical intention.    For Example: once daily and every morning are the same.    The protocol can not identify upper and lower case letters as matching and will fail.     For Example: Take 1 tablet (50 mg) by mouth daily     TAKE 1 TABLET (50 MG) BY MOUTH DAILY    For all fails (red x), verify dose and sig.    If the refill does match what is on file, the RN can still proceed to approve the refill request.       If they do not match, route to the appropriate provider.             Passed - Medication indicated for associated diagnosis     Medication is associated with one or more of the following diagnoses:     Chronic Kidney Disease (CKD)   Coronary Artery Disease (CAD)   Diabetes   Heart Failure (HF)   Hypertension (HTN)   Nephropathy   History of myocarditis   Tachycardia induced cardiomyopathy   STEMI (ST elevation myocardial infarction)   Spontaneous dissection of coronary artery   Status post percutaneous transluminal coronary angioplasty            Passed - Recent (12 mo) or future (90 days) visit within the authorizing provider's specialty     The patient must have completed an in-person or virtual visit within the past 12 months or has a future visit scheduled  within the next 90 days with the authorizing provider s specialty.  Urgent care and e-visits do not qualify as an office visit for this protocol.          Passed - Most recent GFR on file in the past 12 months >30        Passed - Patient is age 18 or older        Passed - Normal serum potassium on file in past 12 months     Recent Labs   Lab Test 10/21/24  1429   POTASSIUM 5.3                Prescription approved per [facility] Refill Protocol.

## 2025-04-16 NOTE — TELEPHONE ENCOUNTER
lisinopril (ZESTRIL) 40 MG tablet 90 tablet 3 10/21/2024     Last Office Visit: 11/4/25  Future Office visit:       Routing refill request to provider for review/approval because:

## 2025-04-29 DIAGNOSIS — E11.65 POORLY CONTROLLED TYPE 2 DIABETES MELLITUS (H): ICD-10-CM

## 2025-04-29 NOTE — TELEPHONE ENCOUNTER
FREESTYLE LILLIANA 2 SENSOR       Routing refill request to provider for review/approval because:  Drug not on the [facility], [facility] or [facility] refill protocol or controlled substance    Has appointment to establish care with Oliver Ayala

## 2025-04-29 NOTE — TELEPHONE ENCOUNTER
Disp Refills Start End JEANNIE   Continuous Glucose Sensor (FREESTYLE LILLIANA 2 SENSOR) MISC 2 each 5 10/31/2024 -- No     Last Office Visit: 11/06/2024  Future Office visit:       Routing refill request to provider for review/approval because:

## 2025-05-11 ENCOUNTER — HEALTH MAINTENANCE LETTER (OUTPATIENT)
Age: 75
End: 2025-05-11

## 2025-07-01 NOTE — PROGRESS NOTES
"  Assessment & Plan     1. Type 2 diabetes mellitus with stage 3b chronic kidney disease, without long-term current use of insulin (H) (Primary)  Continue metformin and jardiance   - Hemoglobin A1c; Future  - Albumin Random Urine Quantitative with Creat Ratio; Future  - TSH with free T4 reflex; Future  - CBC with Platelets & Differential; Future    2. Hyperlipidemia LDL goal <70  Continue lipitor  - Lipid Profile; Future    3. Hypertensive heart disease, unspecified whether heart failure present  stable  - Comprehensive metabolic panel; Future    4. Cerebrovascular accident (CVA), unspecified mechanism (H)  Home care due to difficulty ambulating and with ADL's in the home.   - Home Care Referral  - Walker Order for DME - ONLY FOR DME  - Wheelchair Order for DME - ONLY FOR DME    5. Unsteady gait  As above  - Home Care Referral  - Walker Order for DME - ONLY FOR DME  - Wheelchair Order for DME - ONLY FOR DME    6. Generalized weakness  - Home Care Referral  - Walker Order for DME - ONLY FOR DME  - Wheelchair Order for DME - ONLY FOR DME    7. Frequent falls  - Home Care Referral  - Walker Order for DME - ONLY FOR DME  - Wheelchair Order for DME - ONLY FOR DME      BMI  Estimated body mass index is 38.39 kg/m  as calculated from the following:    Height as of this encounter: 1.88 m (6' 2\").    Weight as of this encounter: 135.6 kg (299 lb).   Weight management plan: Discussed healthy diet and exercise guidelines      Follow-up   Follow-up in 3 months or as needed    The longitudinal plan of care for the diagnosis(es)/condition(s) as documented were addressed during this visit. Due to the added complexity in care, I will continue to support Mendoza in the subsequent management and with ongoing continuity of care.    Yvette Anand,   Certified Adult Nurse Practitioner  [phone]         Subjective   Mendoza is a 75 year old, presenting for the following health issues:  Establish Care    HPI    Est Care    Mendoza " presents today with his wife and daughter to establish care.  He has type 2 diabetes, hyperlipidemia, heart disease, history of CVA and CKD.  He is due for repeat labs today but family are concerned about a few things.      He has become quite weak, they have installed chair lift systems for the stairs.  He does have an old walker but in need of a new one.  Family are also requesting order for wheelchair.  He no longer leaves the house for anything other than medical appointments but even then it is difficult to get him out of the house.      He has had several falls in the house as well.  About 2 weeks ago he fell off the toilet and had to call EMS to lift him up.      Mendoza  has mobility limitations that impaires their ability to participate in one or more mobility related activities of daily living such as toileting, feeding, dressing, grooming and bathing.  These limitations cannot be resolved by cane.  The wheelchair will improve the patients ability to participate in MRADL's and patient will use it on a regular basis.        Recent Labs   Lab Test 10/21/24  1429 12/27/23  1605 12/06/23  0959 11/22/23  0909 11/20/23  1335 04/11/22  1039 04/11/22  1039 07/07/21  1609 02/02/21  1150   A1C 8.4*  --   --  10.9* 11.3*   < > 6.8* 7.5* 7.4*   LDL 46  --   --  32  --   --  33  --  31   HDL 39*  --   --  43  --   --  45  --  43   TRIG 145  --   --  201*  --   --  78  --  150*   ALT 25  --   --  32  --   --  44 49 42   CR 1.82* 1.70*   < > 1.43*  --    < > 1.72* 1.73* 1.76*   GFRESTIMATED 39* 42*   < > 52*  --    < > 42* 39* 38*   GFRESTBLACK  --   --   --   --   --   --   --  45* 45*   POTASSIUM 5.3 4.4   < > 4.7  --   --  4.4 4.4 4.9   TSH  --   --   --  1.65  --   --  1.92  --   --     < > = values in this interval not displayed.      BP Readings from Last 3 Encounters:   07/08/25 93/67   11/04/24 108/62   12/06/23 135/75    Wt Readings from Last 3 Encounters:   07/08/25 135.6 kg (299 lb)   01/06/25 130.6 kg (288 lb)    11/04/24 138.3 kg (305 lb)               Documentation of Face-to-Face and Certification for Home Health Services     Patient: Deon Culver   YOB: 1950  MR Number: [id number]  Today's Date: 7/8/2025    I certify that patient: Deon Culver is under my care and that I, or a nurse practitioner or physician's assistant working with me, had a face-to-face encounter that meets the physician face-to-face encounter requirements with this patient on: July 8, 2025.    This encounter with the patient was in whole, or in part, for the following medical condition, which is the primary reason for home health care: CVA, generalized weakness, falls, heart disease and diabetes. .    I certify that, based on my findings, the following services are medically necessary home health services: Occupational Therapy and Physical Therapy.    My clinical findings support the need for the above services because: Occupational Therapy Services are needed to assess and treat cognitive ability and address ADL safety due to impairment in strength and gait. and Physical Therapy Services are needed to assess and treat the following functional impairments: strength and gait, generalized weakness. .    Further, I certify that my clinical findings support that this patient is homebound (i.e. absences from home require considerable and taxing effort and are for medical reasons or [facility] services or infrequently or of short duration when for other reasons) because: Leaving home is medically contraindicated for the following reason(s): Dyspnea on exertion that makes it so they cannot leave their home for needed services without clinical deterioration.. and Requires assistance of another person or specialized equipment to access medical services because patient: Is unable to exit home safely on own due to: weakness., Is unable to walk greater than 100 feet without rest., and Requires supervision of another for safe  "transfer...    Based on the above findings. I certify that this patient is confined to the home and needs intermittent skilled nursing care, physical therapy and/or speech therapy.  The patient is under my care, and I have initiated the establishment of the plan of care.  This patient will be followed by a physician who will periodically review the plan of care.  Physician/Provider to provide follow up care: Yvette Anand    Responsible Medicare certified P[employer] Physician:   Yvette Anand,   Certified Adult Nurse Practitioner  [phone]   Physician Signature: See electronic signature associated with these discharge orders.  Date: 7/8/2025      Review of Systems  Constitutional, HEENT, cardiovascular, pulmonary, GI, , musculoskeletal, neuro, skin, endocrine and psych systems are negative, except as otherwise noted.      Objective    BP 93/67 (BP Location: Right arm, Patient Position: Sitting, Cuff Size: Adult Large)   Pulse 98   Temp 98  F (36.7  C) (Tympanic)   Resp 18   Ht 1.88 m (6' 2\")   Wt 135.6 kg (299 lb)   SpO2 95%   BMI 38.39 kg/m    Body mass index is 38.39 kg/m .  Physical Exam   GENERAL: alert, no distress, obese, elderly, and fatigued  NECK: no adenopathy, no asymmetry, masses, or scars  RESP: lungs clear to auscultation - no rales, rhonchi or wheezes.  Dyspnea on exertion/walking   CV: regular rate and rhythm, normal S1 S2, no S3 or S4, no murmur, click or rub, no peripheral edema  MS: using wheeled walker with ambulation with difficulty here today.    PSYCH: mentation appears normal, affect normal/bright            Signed Electronically by: Yvette Anand, MARTHA    " Opt out

## 2025-07-08 ENCOUNTER — OFFICE VISIT (OUTPATIENT)
Dept: FAMILY MEDICINE | Facility: OTHER | Age: 75
End: 2025-07-08
Attending: NURSE PRACTITIONER
Payer: COMMERCIAL

## 2025-07-08 ENCOUNTER — TELEPHONE (OUTPATIENT)
Dept: CARE COORDINATION | Facility: OTHER | Age: 75
End: 2025-07-08

## 2025-07-08 VITALS
HEIGHT: 74 IN | SYSTOLIC BLOOD PRESSURE: 93 MMHG | HEART RATE: 98 BPM | DIASTOLIC BLOOD PRESSURE: 67 MMHG | WEIGHT: 299 LBS | TEMPERATURE: 98 F | RESPIRATION RATE: 18 BRPM | BODY MASS INDEX: 38.37 KG/M2 | OXYGEN SATURATION: 95 %

## 2025-07-08 DIAGNOSIS — E78.5 HYPERLIPIDEMIA LDL GOAL <70: ICD-10-CM

## 2025-07-08 DIAGNOSIS — E11.22 TYPE 2 DIABETES MELLITUS WITH STAGE 3B CHRONIC KIDNEY DISEASE, WITHOUT LONG-TERM CURRENT USE OF INSULIN (H): Primary | ICD-10-CM

## 2025-07-08 DIAGNOSIS — I11.9 HYPERTENSIVE HEART DISEASE, UNSPECIFIED WHETHER HEART FAILURE PRESENT: ICD-10-CM

## 2025-07-08 DIAGNOSIS — N18.32 TYPE 2 DIABETES MELLITUS WITH STAGE 3B CHRONIC KIDNEY DISEASE, WITHOUT LONG-TERM CURRENT USE OF INSULIN (H): Primary | ICD-10-CM

## 2025-07-08 DIAGNOSIS — I63.9 CEREBROVASCULAR ACCIDENT (CVA), UNSPECIFIED MECHANISM (H): ICD-10-CM

## 2025-07-08 DIAGNOSIS — R53.1 GENERALIZED WEAKNESS: ICD-10-CM

## 2025-07-08 DIAGNOSIS — R29.6 FREQUENT FALLS: ICD-10-CM

## 2025-07-08 DIAGNOSIS — R26.81 UNSTEADY GAIT: ICD-10-CM

## 2025-07-08 LAB
ALBUMIN SERPL BCG-MCNC: 4.1 G/DL (ref 3.5–5.2)
ALP SERPL-CCNC: 84 U/L (ref 40–150)
ALT SERPL W P-5'-P-CCNC: 14 U/L (ref 0–70)
ANION GAP SERPL CALCULATED.3IONS-SCNC: 14 MMOL/L (ref 7–15)
AST SERPL W P-5'-P-CCNC: 16 U/L (ref 0–45)
BASOPHILS # BLD AUTO: 0 10E3/UL (ref 0–0.2)
BASOPHILS NFR BLD AUTO: 1 %
BILIRUB SERPL-MCNC: 0.3 MG/DL
BUN SERPL-MCNC: 59.8 MG/DL (ref 8–23)
CALCIUM SERPL-MCNC: 10.3 MG/DL (ref 8.8–10.4)
CHLORIDE SERPL-SCNC: 107 MMOL/L (ref 98–107)
CHOLEST SERPL-MCNC: 91 MG/DL
CREAT SERPL-MCNC: 1.92 MG/DL (ref 0.67–1.17)
EGFRCR SERPLBLD CKD-EPI 2021: 36 ML/MIN/1.73M2
EOSINOPHIL # BLD AUTO: 0.2 10E3/UL (ref 0–0.7)
EOSINOPHIL NFR BLD AUTO: 2 %
ERYTHROCYTE [DISTWIDTH] IN BLOOD BY AUTOMATED COUNT: 12.7 % (ref 10–15)
EST. AVERAGE GLUCOSE BLD GHB EST-MCNC: 166 MG/DL
FASTING STATUS PATIENT QL REPORTED: ABNORMAL
FASTING STATUS PATIENT QL REPORTED: ABNORMAL
GLUCOSE SERPL-MCNC: 213 MG/DL (ref 70–99)
HBA1C MFR BLD: 7.4 %
HCO3 SERPL-SCNC: 15 MMOL/L (ref 22–29)
HCT VFR BLD AUTO: 44.6 % (ref 40–53)
HDLC SERPL-MCNC: 39 MG/DL
HGB BLD-MCNC: 15.7 G/DL (ref 13.3–17.7)
IMM GRANULOCYTES # BLD: 0 10E3/UL
IMM GRANULOCYTES NFR BLD: 0 %
LDLC SERPL CALC-MCNC: 29 MG/DL
LYMPHOCYTES # BLD AUTO: 1.2 10E3/UL (ref 0.8–5.3)
LYMPHOCYTES NFR BLD AUTO: 17 %
MCH RBC QN AUTO: 32.9 PG (ref 26.5–33)
MCHC RBC AUTO-ENTMCNC: 35.2 G/DL (ref 31.5–36.5)
MCV RBC AUTO: 94 FL (ref 78–100)
MONOCYTES # BLD AUTO: 0.5 10E3/UL (ref 0–1.3)
MONOCYTES NFR BLD AUTO: 6 %
NEUTROPHILS # BLD AUTO: 5.5 10E3/UL (ref 1.6–8.3)
NEUTROPHILS NFR BLD AUTO: 74 %
NONHDLC SERPL-MCNC: 52 MG/DL
PLATELET # BLD AUTO: 144 10E3/UL (ref 150–450)
POTASSIUM SERPL-SCNC: 5 MMOL/L (ref 3.4–5.3)
PROT SERPL-MCNC: 6.6 G/DL (ref 6.4–8.3)
RBC # BLD AUTO: 4.77 10E6/UL (ref 4.4–5.9)
SODIUM SERPL-SCNC: 136 MMOL/L (ref 135–145)
TRIGL SERPL-MCNC: 115 MG/DL
TSH SERPL DL<=0.005 MIU/L-ACNC: 1.81 UIU/ML (ref 0.3–4.2)
WBC # BLD AUTO: 7.4 10E3/UL (ref 4–11)

## 2025-07-08 PROCEDURE — 36415 COLL VENOUS BLD VENIPUNCTURE: CPT | Mod: ZL | Performed by: NURSE PRACTITIONER

## 2025-07-08 PROCEDURE — 85041 AUTOMATED RBC COUNT: CPT | Mod: ZL | Performed by: NURSE PRACTITIONER

## 2025-07-08 PROCEDURE — 80061 LIPID PANEL: CPT | Mod: ZL | Performed by: NURSE PRACTITIONER

## 2025-07-08 PROCEDURE — 83036 HEMOGLOBIN GLYCOSYLATED A1C: CPT | Mod: ZL | Performed by: NURSE PRACTITIONER

## 2025-07-08 PROCEDURE — 84443 ASSAY THYROID STIM HORMONE: CPT | Mod: ZL | Performed by: NURSE PRACTITIONER

## 2025-07-08 PROCEDURE — 80053 COMPREHEN METABOLIC PANEL: CPT | Mod: ZL | Performed by: NURSE PRACTITIONER

## 2025-07-08 PROCEDURE — G0463 HOSPITAL OUTPT CLINIC VISIT: HCPCS

## 2025-07-08 ASSESSMENT — ANXIETY QUESTIONNAIRES
7. FEELING AFRAID AS IF SOMETHING AWFUL MIGHT HAPPEN: NOT AT ALL
5. BEING SO RESTLESS THAT IT IS HARD TO SIT STILL: NOT AT ALL
2. NOT BEING ABLE TO STOP OR CONTROL WORRYING: NOT AT ALL
IF YOU CHECKED OFF ANY PROBLEMS ON THIS QUESTIONNAIRE, HOW DIFFICULT HAVE THESE PROBLEMS MADE IT FOR YOU TO DO YOUR WORK, TAKE CARE OF THINGS AT HOME, OR GET ALONG WITH OTHER PEOPLE: NOT DIFFICULT AT ALL
GAD7 TOTAL SCORE: 0
8. IF YOU CHECKED OFF ANY PROBLEMS, HOW DIFFICULT HAVE THESE MADE IT FOR YOU TO DO YOUR WORK, TAKE CARE OF THINGS AT HOME, OR GET ALONG WITH OTHER PEOPLE?: NOT DIFFICULT AT ALL
GAD7 TOTAL SCORE: 0
6. BECOMING EASILY ANNOYED OR IRRITABLE: NOT AT ALL
1. FEELING NERVOUS, ANXIOUS, OR ON EDGE: NOT AT ALL
3. WORRYING TOO MUCH ABOUT DIFFERENT THINGS: NOT AT ALL
4. TROUBLE RELAXING: NOT AT ALL
GAD7 TOTAL SCORE: 0
7. FEELING AFRAID AS IF SOMETHING AWFUL MIGHT HAPPEN: NOT AT ALL

## 2025-07-08 ASSESSMENT — PAIN SCALES - GENERAL: PAINLEVEL_OUTOF10: NO PAIN (0)

## 2025-07-08 ASSESSMENT — PATIENT HEALTH QUESTIONNAIRE - PHQ9
SUM OF ALL RESPONSES TO PHQ QUESTIONS 1-9: 4
SUM OF ALL RESPONSES TO PHQ QUESTIONS 1-9: 4
10. IF YOU CHECKED OFF ANY PROBLEMS, HOW DIFFICULT HAVE THESE PROBLEMS MADE IT FOR YOU TO DO YOUR WORK, TAKE CARE OF THINGS AT HOME, OR GET ALONG WITH OTHER PEOPLE: SOMEWHAT DIFFICULT

## 2025-07-08 NOTE — PATIENT INSTRUCTIONS
Assessment & Plan     1. Type 2 diabetes mellitus with stage 3b chronic kidney disease, without long-term current use of insulin (H) (Primary)  Continue metformin and jardiance   - Hemoglobin A1c; Future  - Albumin Random Urine Quantitative with Creat Ratio; Future  - TSH with free T4 reflex; Future  - CBC with Platelets & Differential; Future    2. Hyperlipidemia LDL goal <70  Continue lipitor  - Lipid Profile; Future    3. Hypertensive heart disease, unspecified whether heart failure present  stable  - Comprehensive metabolic panel; Future    4. Cerebrovascular accident (CVA), unspecified mechanism (H)  Home care due to difficulty ambulating and with ADL's in the home.   - Home Care Referral  - Walker Order for DME - ONLY FOR DME  - Wheelchair Order for DME - ONLY FOR DME    5. Unsteady gait  As above  - Home Care Referral  - Walker Order for DME - ONLY FOR DME  - Wheelchair Order for DME - ONLY FOR DME    6. Generalized weakness  - Home Care Referral  - Walker Order for DME - ONLY FOR DME  - Wheelchair Order for DME - ONLY FOR DME    7. Frequent falls  - Home Care Referral  - Walker Order for DME - ONLY FOR DME  - Wheelchair Order for DME - ONLY FOR DME      Follow-up in 3 months or  as needed    Yvette Anand,   Certified Adult Nurse Practitioner  [phone]

## 2025-07-08 NOTE — TELEPHONE ENCOUNTER
Andres Rodriguez RN from [facility] called to clinic.  They are down to one PT and are only able to serve a 15 mile radius.  Patient is out of the service area.  Will provide update to PCP.

## 2025-07-08 NOTE — TELEPHONE ENCOUNTER
I put on the referral [facility], that is who he thinks he had last year.  Do I need to do a whole new order?  Im ok with [facility] if they have openings.

## 2025-07-16 DIAGNOSIS — I10 BENIGN ESSENTIAL HYPERTENSION: ICD-10-CM

## 2025-07-16 RX ORDER — AMLODIPINE BESYLATE 10 MG/1
10 TABLET ORAL DAILY
Qty: 90 TABLET | Refills: 3 | Status: SHIPPED | OUTPATIENT
Start: 2025-07-16

## 2025-07-16 NOTE — TELEPHONE ENCOUNTER
Telephone call placed to [facility] to verify referral was sent to and check on status of referral to determine if needing to check with other available home care services.  No answer, message was left requesting a call back with call back number provided.

## 2025-07-17 NOTE — TELEPHONE ENCOUNTER
Cheyenne from [facility] returned call to DINO ANDREWS.  States that [facility] went to patient's home on Monday and he declined services.  Cheyenne states he was looking for services that he had previously for lymphedema treatment/lymphatic massage and they do not provide this treatment.    Will provide update to PCP.

## 2025-07-24 ENCOUNTER — TELEPHONE (OUTPATIENT)
Dept: FAMILY MEDICINE | Facility: OTHER | Age: 75
End: 2025-07-24

## 2025-07-24 NOTE — TELEPHONE ENCOUNTER
"9:01 AM    Reason for Call: Phone Call / FYI from [facility]    Description: Cheyenne with [facility] calling on behalf of patient as an FYI for Provider.  Cheyenne states: PCP had referred patient a couple weeks ago - patient had declined at that time. Daughter gave [facility] a call and stated that the patient changed his mind- \"going to give it another try on Monday\".   States they can still use the original referral.   Just calling with an update, if have any questions - please call Cheyenne at the number listed below        Preferred method for responding to this message: Telephone Call  What is your phone number ? [phone]    If we cannot reach you directly, may we leave a detailed response at the number you provided? Yes    Can this message wait until your PCP/provider returns, if available today? YES    Sharon De Santiago  "

## 2025-07-28 DIAGNOSIS — Z53.9 PERSONS ENCOUNTERING HEALTH SERVICES FOR SPECIFIC PROCEDURES, NOT CARRIED OUT: Primary | ICD-10-CM
